# Patient Record
Sex: FEMALE | Race: WHITE | Employment: OTHER | URBAN - METROPOLITAN AREA
[De-identification: names, ages, dates, MRNs, and addresses within clinical notes are randomized per-mention and may not be internally consistent; named-entity substitution may affect disease eponyms.]

---

## 2018-06-29 ENCOUNTER — HOSPITAL ENCOUNTER (OUTPATIENT)
Dept: INFUSION THERAPY | Age: 83
Setting detail: INFUSION SERIES
Discharge: HOME | End: 2018-06-29
Payer: MEDICARE

## 2018-06-29 VITALS
SYSTOLIC BLOOD PRESSURE: 148 MMHG | DIASTOLIC BLOOD PRESSURE: 64 MMHG | OXYGEN SATURATION: 95 % | RESPIRATION RATE: 16 BRPM | HEART RATE: 71 BPM | TEMPERATURE: 98.1 F

## 2018-06-29 DIAGNOSIS — M81.0 SENILE OSTEOPOROSIS: ICD-10-CM

## 2018-06-29 LAB — CALCIUM SERPL-MCNC: 9.8 MG/DL (ref 8.6–10.2)

## 2018-06-29 PROCEDURE — 36415 COLL VENOUS BLD VENIPUNCTURE: CPT

## 2018-06-29 PROCEDURE — 96372 THER/PROPH/DIAG INJ SC/IM: CPT

## 2018-06-29 PROCEDURE — 6360000002 HC RX W HCPCS: Performed by: INTERNAL MEDICINE

## 2018-06-29 PROCEDURE — 82310 ASSAY OF CALCIUM: CPT

## 2018-06-29 RX ADMIN — DENOSUMAB 60 MG: 60 INJECTION SUBCUTANEOUS at 09:28

## 2019-01-02 ENCOUNTER — HOSPITAL ENCOUNTER (OUTPATIENT)
Dept: INFUSION THERAPY | Age: 84
Setting detail: INFUSION SERIES
Discharge: HOME | End: 2019-01-02
Payer: MEDICARE

## 2019-01-02 VITALS
OXYGEN SATURATION: 95 % | RESPIRATION RATE: 18 BRPM | SYSTOLIC BLOOD PRESSURE: 140 MMHG | TEMPERATURE: 98 F | DIASTOLIC BLOOD PRESSURE: 65 MMHG | HEART RATE: 77 BPM

## 2019-01-02 DIAGNOSIS — M81.0 SENILE OSTEOPOROSIS: ICD-10-CM

## 2019-01-02 LAB — CALCIUM SERPL-MCNC: 9.6 MG/DL (ref 8.6–10.2)

## 2019-01-02 PROCEDURE — 6360000002 HC RX W HCPCS: Performed by: INTERNAL MEDICINE

## 2019-01-02 PROCEDURE — 82310 ASSAY OF CALCIUM: CPT

## 2019-01-02 PROCEDURE — 96372 THER/PROPH/DIAG INJ SC/IM: CPT

## 2019-01-02 PROCEDURE — 36415 COLL VENOUS BLD VENIPUNCTURE: CPT

## 2019-01-02 RX ADMIN — DENOSUMAB 60 MG: 60 INJECTION SUBCUTANEOUS at 10:44

## 2019-01-02 NOTE — PROGRESS NOTES
Patient was offered a patient informational packet at discharge. Patient refuses packet at this time.

## 2019-02-15 ENCOUNTER — HOSPITAL ENCOUNTER (OUTPATIENT)
Dept: MRI IMAGING | Age: 84
Discharge: HOME | End: 2019-02-17
Payer: MEDICARE

## 2019-02-15 DIAGNOSIS — R42 DIZZINESS AND GIDDINESS: ICD-10-CM

## 2019-02-15 PROCEDURE — 70551 MRI BRAIN STEM W/O DYE: CPT

## 2019-08-08 ENCOUNTER — HOSPITAL ENCOUNTER (OUTPATIENT)
Dept: INFUSION THERAPY | Age: 84
Setting detail: INFUSION SERIES
Discharge: HOME | End: 2019-08-08
Payer: MEDICARE

## 2019-08-08 DIAGNOSIS — M81.0 SENILE OSTEOPOROSIS: Primary | ICD-10-CM

## 2019-08-08 LAB — CALCIUM SERPL-MCNC: 10.3 MG/DL (ref 8.6–10.2)

## 2019-08-08 PROCEDURE — 82310 ASSAY OF CALCIUM: CPT

## 2019-08-08 PROCEDURE — 36415 COLL VENOUS BLD VENIPUNCTURE: CPT

## 2019-08-08 PROCEDURE — 6360000002 HC RX W HCPCS: Performed by: INTERNAL MEDICINE

## 2019-08-08 PROCEDURE — 96372 THER/PROPH/DIAG INJ SC/IM: CPT

## 2019-08-08 RX ADMIN — DENOSUMAB 60 MG: 60 INJECTION SUBCUTANEOUS at 11:20

## 2019-09-19 ENCOUNTER — APPOINTMENT (OUTPATIENT)
Dept: ULTRASOUND IMAGING | Age: 84
End: 2019-09-19
Payer: MEDICARE

## 2019-09-19 ENCOUNTER — APPOINTMENT (OUTPATIENT)
Dept: CT IMAGING | Age: 84
End: 2019-09-19
Payer: MEDICARE

## 2019-09-19 ENCOUNTER — APPOINTMENT (OUTPATIENT)
Dept: GENERAL RADIOLOGY | Age: 84
End: 2019-09-19
Payer: MEDICARE

## 2019-09-19 ENCOUNTER — HOSPITAL ENCOUNTER (OUTPATIENT)
Age: 84
Setting detail: OBSERVATION
Discharge: HOME | End: 2019-09-21
Attending: EMERGENCY MEDICINE | Admitting: INTERNAL MEDICINE
Payer: MEDICARE

## 2019-09-19 DIAGNOSIS — R93.5 ABNORMAL CT OF THE ABDOMEN: ICD-10-CM

## 2019-09-19 DIAGNOSIS — N30.00 ACUTE CYSTITIS WITHOUT HEMATURIA: ICD-10-CM

## 2019-09-19 DIAGNOSIS — R11.0 NAUSEA: ICD-10-CM

## 2019-09-19 DIAGNOSIS — R10.11 ABDOMINAL PAIN, RIGHT UPPER QUADRANT: Primary | ICD-10-CM

## 2019-09-19 LAB
ALBUMIN SERPL-MCNC: 4.4 G/DL (ref 3.5–5.2)
ALP BLD-CCNC: 61 U/L (ref 35–104)
ALT SERPL-CCNC: 15 U/L (ref 0–32)
ANION GAP SERPL CALCULATED.3IONS-SCNC: 14 MMOL/L (ref 7–16)
AST SERPL-CCNC: 19 U/L (ref 0–31)
BACTERIA: ABNORMAL /HPF
BASOPHILS ABSOLUTE: 0.08 E9/L (ref 0–0.2)
BASOPHILS RELATIVE PERCENT: 1.2 % (ref 0–2)
BILIRUB SERPL-MCNC: 0.4 MG/DL (ref 0–1.2)
BILIRUBIN URINE: NEGATIVE
BLOOD, URINE: NEGATIVE
BUN BLDV-MCNC: 26 MG/DL (ref 8–23)
CALCIUM SERPL-MCNC: 9.1 MG/DL (ref 8.6–10.2)
CHLORIDE BLD-SCNC: 105 MMOL/L (ref 98–107)
CLARITY: ABNORMAL
CO2: 23 MMOL/L (ref 22–29)
COLOR: YELLOW
CREAT SERPL-MCNC: 1.3 MG/DL (ref 0.5–1)
EKG ATRIAL RATE: 78 BPM
EKG P AXIS: 69 DEGREES
EKG P-R INTERVAL: 194 MS
EKG Q-T INTERVAL: 380 MS
EKG QRS DURATION: 92 MS
EKG QTC CALCULATION (BAZETT): 433 MS
EKG R AXIS: 29 DEGREES
EKG T AXIS: 37 DEGREES
EKG VENTRICULAR RATE: 78 BPM
EOSINOPHILS ABSOLUTE: 0.28 E9/L (ref 0.05–0.5)
EOSINOPHILS RELATIVE PERCENT: 4 % (ref 0–6)
GFR AFRICAN AMERICAN: 47
GFR NON-AFRICAN AMERICAN: 39 ML/MIN/1.73
GLUCOSE BLD-MCNC: 116 MG/DL (ref 74–99)
GLUCOSE URINE: NEGATIVE MG/DL
HCT VFR BLD CALC: 42.4 % (ref 34–48)
HEMOGLOBIN: 14.1 G/DL (ref 11.5–15.5)
IMMATURE GRANULOCYTES #: 0.06 E9/L
IMMATURE GRANULOCYTES %: 0.9 % (ref 0–5)
KETONES, URINE: NEGATIVE MG/DL
LACTIC ACID: 1.5 MMOL/L (ref 0.5–2.2)
LEUKOCYTE ESTERASE, URINE: ABNORMAL
LIPASE: 27 U/L (ref 13–60)
LYMPHOCYTES ABSOLUTE: 1.06 E9/L (ref 1.5–4)
LYMPHOCYTES RELATIVE PERCENT: 15.3 % (ref 20–42)
MCH RBC QN AUTO: 28.3 PG (ref 26–35)
MCHC RBC AUTO-ENTMCNC: 33.3 % (ref 32–34.5)
MCV RBC AUTO: 85.1 FL (ref 80–99.9)
MONOCYTES ABSOLUTE: 0.81 E9/L (ref 0.1–0.95)
MONOCYTES RELATIVE PERCENT: 11.7 % (ref 2–12)
NEUTROPHILS ABSOLUTE: 4.63 E9/L (ref 1.8–7.3)
NEUTROPHILS RELATIVE PERCENT: 66.9 % (ref 43–80)
NITRITE, URINE: NEGATIVE
PDW BLD-RTO: 14.6 FL (ref 11.5–15)
PH UA: 6.5 (ref 5–9)
PLATELET # BLD: 201 E9/L (ref 130–450)
PMV BLD AUTO: 11.2 FL (ref 7–12)
POTASSIUM SERPL-SCNC: 4.5 MMOL/L (ref 3.5–5)
PROTEIN UA: NEGATIVE MG/DL
RBC # BLD: 4.98 E12/L (ref 3.5–5.5)
RBC UA: ABNORMAL /HPF (ref 0–2)
SODIUM BLD-SCNC: 142 MMOL/L (ref 132–146)
SPECIFIC GRAVITY UA: 1.01 (ref 1–1.03)
TOTAL PROTEIN: 7.9 G/DL (ref 6.4–8.3)
TROPONIN: <0.01 NG/ML (ref 0–0.03)
UROBILINOGEN, URINE: 0.2 E.U./DL
WBC # BLD: 6.9 E9/L (ref 4.5–11.5)
WBC UA: ABNORMAL /HPF (ref 0–5)

## 2019-09-19 PROCEDURE — 85025 COMPLETE CBC W/AUTO DIFF WBC: CPT

## 2019-09-19 PROCEDURE — 6360000002 HC RX W HCPCS: Performed by: INTERNAL MEDICINE

## 2019-09-19 PROCEDURE — 99285 EMERGENCY DEPT VISIT HI MDM: CPT

## 2019-09-19 PROCEDURE — 36415 COLL VENOUS BLD VENIPUNCTURE: CPT

## 2019-09-19 PROCEDURE — C9113 INJ PANTOPRAZOLE SODIUM, VIA: HCPCS | Performed by: INTERNAL MEDICINE

## 2019-09-19 PROCEDURE — 6360000004 HC RX CONTRAST MEDICATION: Performed by: RADIOLOGY

## 2019-09-19 PROCEDURE — 84484 ASSAY OF TROPONIN QUANT: CPT

## 2019-09-19 PROCEDURE — 96375 TX/PRO/DX INJ NEW DRUG ADDON: CPT

## 2019-09-19 PROCEDURE — 93005 ELECTROCARDIOGRAM TRACING: CPT | Performed by: PHYSICIAN ASSISTANT

## 2019-09-19 PROCEDURE — 6360000002 HC RX W HCPCS: Performed by: PHYSICIAN ASSISTANT

## 2019-09-19 PROCEDURE — G0378 HOSPITAL OBSERVATION PER HR: HCPCS

## 2019-09-19 PROCEDURE — 87077 CULTURE AEROBIC IDENTIFY: CPT

## 2019-09-19 PROCEDURE — C9113 INJ PANTOPRAZOLE SODIUM, VIA: HCPCS | Performed by: PHYSICIAN ASSISTANT

## 2019-09-19 PROCEDURE — 2580000003 HC RX 258: Performed by: PHYSICIAN ASSISTANT

## 2019-09-19 PROCEDURE — 71046 X-RAY EXAM CHEST 2 VIEWS: CPT

## 2019-09-19 PROCEDURE — 81001 URINALYSIS AUTO W/SCOPE: CPT

## 2019-09-19 PROCEDURE — 6370000000 HC RX 637 (ALT 250 FOR IP): Performed by: INTERNAL MEDICINE

## 2019-09-19 PROCEDURE — 2580000003 HC RX 258: Performed by: INTERNAL MEDICINE

## 2019-09-19 PROCEDURE — 83605 ASSAY OF LACTIC ACID: CPT

## 2019-09-19 PROCEDURE — 96374 THER/PROPH/DIAG INJ IV PUSH: CPT

## 2019-09-19 PROCEDURE — 83690 ASSAY OF LIPASE: CPT

## 2019-09-19 PROCEDURE — 87186 SC STD MICRODIL/AGAR DIL: CPT

## 2019-09-19 PROCEDURE — 96376 TX/PRO/DX INJ SAME DRUG ADON: CPT

## 2019-09-19 PROCEDURE — 96372 THER/PROPH/DIAG INJ SC/IM: CPT

## 2019-09-19 PROCEDURE — 80053 COMPREHEN METABOLIC PANEL: CPT

## 2019-09-19 PROCEDURE — 74177 CT ABD & PELVIS W/CONTRAST: CPT

## 2019-09-19 PROCEDURE — 87088 URINE BACTERIA CULTURE: CPT

## 2019-09-19 PROCEDURE — 93010 ELECTROCARDIOGRAM REPORT: CPT | Performed by: INTERNAL MEDICINE

## 2019-09-19 PROCEDURE — 76705 ECHO EXAM OF ABDOMEN: CPT

## 2019-09-19 RX ORDER — LEVOTHYROXINE SODIUM 0.05 MG/1
50 TABLET ORAL DAILY
COMMUNITY
End: 2021-06-07 | Stop reason: SDUPTHER

## 2019-09-19 RX ORDER — 0.9 % SODIUM CHLORIDE 0.9 %
10 VIAL (ML) INJECTION DAILY
Status: DISCONTINUED | OUTPATIENT
Start: 2019-09-19 | End: 2019-09-20

## 2019-09-19 RX ORDER — LISINOPRIL 10 MG/1
10 TABLET ORAL DAILY
Status: DISCONTINUED | OUTPATIENT
Start: 2019-09-19 | End: 2019-09-21 | Stop reason: HOSPADM

## 2019-09-19 RX ORDER — AMLODIPINE BESYLATE AND BENAZEPRIL HYDROCHLORIDE 5; 10 MG/1; MG/1
1 CAPSULE ORAL 2 TIMES DAILY
Status: DISCONTINUED | OUTPATIENT
Start: 2019-09-19 | End: 2019-09-19 | Stop reason: CLARIF

## 2019-09-19 RX ORDER — AMLODIPINE BESYLATE AND BENAZEPRIL HYDROCHLORIDE 5; 10 MG/1; MG/1
1 CAPSULE ORAL 2 TIMES DAILY
COMMUNITY
End: 2021-06-07 | Stop reason: SDUPTHER

## 2019-09-19 RX ORDER — AMLODIPINE BESYLATE 5 MG/1
5 TABLET ORAL DAILY
Status: DISCONTINUED | OUTPATIENT
Start: 2019-09-19 | End: 2019-09-21 | Stop reason: HOSPADM

## 2019-09-19 RX ORDER — PROMETHAZINE HYDROCHLORIDE 25 MG/ML
25 INJECTION, SOLUTION INTRAMUSCULAR; INTRAVENOUS ONCE
Status: COMPLETED | OUTPATIENT
Start: 2019-09-19 | End: 2019-09-19

## 2019-09-19 RX ORDER — 0.9 % SODIUM CHLORIDE 0.9 %
1000 INTRAVENOUS SOLUTION INTRAVENOUS ONCE
Status: COMPLETED | OUTPATIENT
Start: 2019-09-19 | End: 2019-09-19

## 2019-09-19 RX ORDER — PANTOPRAZOLE SODIUM 40 MG/10ML
40 INJECTION, POWDER, LYOPHILIZED, FOR SOLUTION INTRAVENOUS DAILY
Status: DISCONTINUED | OUTPATIENT
Start: 2019-09-19 | End: 2019-09-19 | Stop reason: SDUPTHER

## 2019-09-19 RX ORDER — HYDROCODONE BITARTRATE AND ACETAMINOPHEN 5; 325 MG/1; MG/1
1 TABLET ORAL EVERY 6 HOURS PRN
Status: DISCONTINUED | OUTPATIENT
Start: 2019-09-19 | End: 2019-09-21 | Stop reason: HOSPADM

## 2019-09-19 RX ORDER — SIMVASTATIN 20 MG
20 TABLET ORAL DAILY
Status: DISCONTINUED | OUTPATIENT
Start: 2019-09-19 | End: 2019-09-21 | Stop reason: HOSPADM

## 2019-09-19 RX ORDER — FENTANYL CITRATE 50 UG/ML
25 INJECTION, SOLUTION INTRAMUSCULAR; INTRAVENOUS ONCE
Status: COMPLETED | OUTPATIENT
Start: 2019-09-19 | End: 2019-09-19

## 2019-09-19 RX ORDER — PANTOPRAZOLE SODIUM 40 MG/10ML
40 INJECTION, POWDER, LYOPHILIZED, FOR SOLUTION INTRAVENOUS ONCE
Status: COMPLETED | OUTPATIENT
Start: 2019-09-19 | End: 2019-09-19

## 2019-09-19 RX ORDER — ONDANSETRON 2 MG/ML
4 INJECTION INTRAMUSCULAR; INTRAVENOUS EVERY 8 HOURS PRN
Status: DISCONTINUED | OUTPATIENT
Start: 2019-09-19 | End: 2019-09-21 | Stop reason: HOSPADM

## 2019-09-19 RX ORDER — KETOROLAC TROMETHAMINE 15 MG/ML
15 INJECTION, SOLUTION INTRAMUSCULAR; INTRAVENOUS ONCE
Status: COMPLETED | OUTPATIENT
Start: 2019-09-19 | End: 2019-09-19

## 2019-09-19 RX ORDER — LEVOTHYROXINE SODIUM 0.05 MG/1
50 TABLET ORAL DAILY
Status: DISCONTINUED | OUTPATIENT
Start: 2019-09-19 | End: 2019-09-21 | Stop reason: HOSPADM

## 2019-09-19 RX ORDER — PANTOPRAZOLE SODIUM 40 MG/10ML
40 INJECTION, POWDER, LYOPHILIZED, FOR SOLUTION INTRAVENOUS DAILY
Status: DISCONTINUED | OUTPATIENT
Start: 2019-09-19 | End: 2019-09-20

## 2019-09-19 RX ORDER — PANTOPRAZOLE SODIUM 40 MG/1
40 TABLET, DELAYED RELEASE ORAL DAILY
COMMUNITY
End: 2021-06-07 | Stop reason: SDUPTHER

## 2019-09-19 RX ORDER — ONDANSETRON 2 MG/ML
4 INJECTION INTRAMUSCULAR; INTRAVENOUS ONCE
Status: COMPLETED | OUTPATIENT
Start: 2019-09-19 | End: 2019-09-19

## 2019-09-19 RX ADMIN — FENTANYL CITRATE 25 MCG: 50 INJECTION INTRAMUSCULAR; INTRAVENOUS at 11:00

## 2019-09-19 RX ADMIN — PROMETHAZINE HYDROCHLORIDE 25 MG: 25 INJECTION INTRAMUSCULAR; INTRAVENOUS at 12:20

## 2019-09-19 RX ADMIN — WATER 1 G: 1 INJECTION INTRAMUSCULAR; INTRAVENOUS; SUBCUTANEOUS at 11:01

## 2019-09-19 RX ADMIN — AMLODIPINE BESYLATE 5 MG: 5 TABLET ORAL at 17:39

## 2019-09-19 RX ADMIN — FENTANYL CITRATE 25 MCG: 50 INJECTION INTRAMUSCULAR; INTRAVENOUS at 12:58

## 2019-09-19 RX ADMIN — HYDROCODONE BITARTRATE AND ACETAMINOPHEN 1 TABLET: 5; 325 TABLET ORAL at 20:11

## 2019-09-19 RX ADMIN — LISINOPRIL 10 MG: 10 TABLET ORAL at 17:39

## 2019-09-19 RX ADMIN — PANTOPRAZOLE SODIUM 40 MG: 40 INJECTION, POWDER, FOR SOLUTION INTRAVENOUS at 17:39

## 2019-09-19 RX ADMIN — SIMVASTATIN 20 MG: 20 TABLET, FILM COATED ORAL at 17:41

## 2019-09-19 RX ADMIN — LEVOTHYROXINE SODIUM 50 MCG: 50 TABLET ORAL at 17:39

## 2019-09-19 RX ADMIN — ONDANSETRON 4 MG: 2 INJECTION INTRAMUSCULAR; INTRAVENOUS at 08:46

## 2019-09-19 RX ADMIN — SODIUM CHLORIDE 10 ML: 9 INJECTION, SOLUTION INTRAMUSCULAR; INTRAVENOUS; SUBCUTANEOUS at 17:39

## 2019-09-19 RX ADMIN — IOPAMIDOL 80 ML: 755 INJECTION, SOLUTION INTRAVENOUS at 09:35

## 2019-09-19 RX ADMIN — SODIUM CHLORIDE 1000 ML: 9 INJECTION, SOLUTION INTRAVENOUS at 08:45

## 2019-09-19 RX ADMIN — PANTOPRAZOLE SODIUM 40 MG: 40 INJECTION, POWDER, FOR SOLUTION INTRAVENOUS at 12:58

## 2019-09-19 RX ADMIN — KETOROLAC TROMETHAMINE 15 MG: 15 INJECTION, SOLUTION INTRAMUSCULAR; INTRAVENOUS at 08:45

## 2019-09-19 ASSESSMENT — PAIN DESCRIPTION - ONSET: ONSET: ON-GOING

## 2019-09-19 ASSESSMENT — PAIN DESCRIPTION - FREQUENCY
FREQUENCY: CONTINUOUS

## 2019-09-19 ASSESSMENT — PAIN DESCRIPTION - DESCRIPTORS
DESCRIPTORS: NAGGING
DESCRIPTORS: SHARP
DESCRIPTORS: NAGGING

## 2019-09-19 ASSESSMENT — PAIN - FUNCTIONAL ASSESSMENT: PAIN_FUNCTIONAL_ASSESSMENT: PREVENTS OR INTERFERES SOME ACTIVE ACTIVITIES AND ADLS

## 2019-09-19 ASSESSMENT — PAIN DESCRIPTION - PAIN TYPE
TYPE: ACUTE PAIN
TYPE: ACUTE PAIN

## 2019-09-19 ASSESSMENT — PAIN DESCRIPTION - LOCATION
LOCATION: ABDOMEN;FLANK
LOCATION: ABDOMEN
LOCATION: ABDOMEN

## 2019-09-19 ASSESSMENT — PAIN SCALES - GENERAL
PAINLEVEL_OUTOF10: 9
PAINLEVEL_OUTOF10: 8
PAINLEVEL_OUTOF10: 10
PAINLEVEL_OUTOF10: 8
PAINLEVEL_OUTOF10: 0
PAINLEVEL_OUTOF10: 9
PAINLEVEL_OUTOF10: 9

## 2019-09-19 ASSESSMENT — PAIN DESCRIPTION - ORIENTATION
ORIENTATION: RIGHT
ORIENTATION: RIGHT;OUTER
ORIENTATION: RIGHT;OUTER

## 2019-09-19 ASSESSMENT — PAIN DESCRIPTION - PROGRESSION: CLINICAL_PROGRESSION: NOT CHANGED

## 2019-09-19 NOTE — PLAN OF CARE
Problem: Pain:  Goal: Pain level will decrease  Description  Pain level will decrease  Outcome: Met This Shift  Goal: Control of acute pain  Description  Control of acute pain  Outcome: Met This Shift     Problem: Falls - Risk of:  Goal: Will remain free from falls  Description  Will remain free from falls  Outcome: Met This Shift     Problem: Risk for Impaired Skin Integrity  Goal: Tissue integrity - skin and mucous membranes  Description  Structural intactness and normal physiological function of skin and  mucous membranes.   Outcome: Met This Shift

## 2019-09-19 NOTE — CONSULTS
[facility]  Dr. Micah Wheeler M.D., Dr. Laurent Sever, M.D., TIARRA CaleroO., Dr. Corrina Morrow M.D., Dr. Melissa Fritz D.O. Patient Name: Yasmine Garcia  MRN: [id number]  : 1932 (80 y.o. female)  Allergies: is allergic to morphine sulfate; vitamin k [phytonadione]; and penicillins. Date of Service: 2019       Reason for Consultation:  Abnormal Ct of the abdomen     HISTORY OF PRESENT ILLNESS:    Udayalon Sequeira is a 44-year-old female with a past history significant for hypothyroidism,HLD,Essential HTN,and GERD. Patient presents to suggest emergency room this afternoon with a main complaint of right upper quadrant pain. patient states she has had right upper quadrant pain with radiation into her right flank that has been ongoing for the past 24 hours. Patient states that started acutely. Pt denies any correlation with food. Pt has a hx of lap millie. Patient has some radiation into her back. Patient denies any rash. Patient denies any hematemesis hematochezia or melena. Routine lab works obtained. Patient was found to have a creatinine of 1.3 BUN 26 hemoglobin stable at 14.1 hematocrit 42.4 MCV of 85. CT the abdomen was obtained. CT abdomen showed gastric wall thickening infectious versus inflammatory versus malignancy. . Patient denies any history of EGD. Patient denies any recent travel or time spent in the woods or ingestion of foods. Patient states last colonoscopy was approximately year and half ago with Dr. Nandini Allen in the office. REVIEW OF SYSTEMS:   Constitutional: Denies fever, chills, or unintentional weight loss. HEENT: Denies double or blurry vision, headaches, ear pain or ringing in the ears. No drainage from the ears, nose or throat. Cardiovascular: Denies any chest pain, irregular heartbeats, or palpitations. Respiratory: Denies shortness of breath, coughing, sputum production, hemoptysis, or wheezing.    Gastrointestinal: Denies nausea,  OTHER SURGICAL HISTORY  07/23/2012    repair lumbar meningiocele with lumbar drain    OTHER SURGICAL HISTORY N/A 10/04/2016    sacral nerve stimulation    TONSILLECTOMY      WRIST FRACTURE SURGERY  2008    left       Home Medications:  Prior to Admission medications    Medication Sig Start Date End Date Taking? Authorizing Provider   amLODIPine-benazepril (LOTREL) 5-10 MG per capsule Take 1 capsule by mouth 2 times daily   Yes Historical Provider, MD   levothyroxine (SYNTHROID) 50 MCG tablet Take 50 mcg by mouth Daily   Yes Historical Provider, MD   pantoprazole (PROTONIX) 40 MG tablet Take 40 mg by mouth daily   Yes Historical Provider, MD   prochlorperazine (COMPAZINE) 5 MG tablet Take 5 mg by mouth every 6 hours as needed for Nausea   Yes Historical Provider, MD   simvastatin (ZOCOR) 20 MG tablet Take 20 mg by mouth daily   Yes Historical Provider, MD   Multiple Vitamin (MULTI-VITAMIN PO) Take 1 tablet by mouth daily. Yes Historical Provider, MD   aspirin 81 MG chewable tablet   Take 81 mg by mouth daily    Yes Historical Provider, MD       Allergies: Morphine sulfate; Vitamin k [phytonadione]; and Penicillins    Social History:  Social History     Socioeconomic History    Marital status:      Spouse name: Not on file    Number of children: Not on file    Years of education: Not on file    Highest education level: Not on file   Occupational History    Not on file   Social Needs    Financial resource strain: Not on file    Food insecurity:     Worry: Not on file     Inability: Not on file    Transportation needs:     Medical: Not on file     Non-medical: Not on file   Tobacco Use    Smoking status: Never Smoker   Substance and Sexual Activity    Alcohol use:  Yes     Alcohol/week: 1.0 standard drinks     Types: 1 Glasses of wine per week     Comment: daily    Drug use: No    Sexual activity: Not on file   Lifestyle    Physical activity:     Days per week: Not on file     Minutes per Cranial nerves II-XII are grossly intact. Motor is 5 out of 5 bilaterally. SKIN: Normal skin color, texture, and turgor. There is no redness, warmth, or swelling. No bruising or bleeding, no mottling. PSYCH: Affect, behavior and insight are all within normal limits.       DATA:  Results for orders placed or performed during the hospital encounter of 09/19/19   CBC Auto Differential   Result Value Ref Range    WBC 6.9 4.5 - 11.5 E9/L    RBC 4.98 3.50 - 5.50 E12/L    Hemoglobin 14.1 11.5 - 15.5 g/dL    Hematocrit 42.4 34.0 - 48.0 %    MCV 85.1 80.0 - 99.9 fL    MCH 28.3 26.0 - 35.0 pg    MCHC 33.3 32.0 - 34.5 %    RDW 14.6 11.5 - 15.0 fL    Platelets 596 933 - 340 E9/L    MPV 11.2 7.0 - 12.0 fL    Neutrophils % 66.9 43.0 - 80.0 %    Immature Granulocytes % 0.9 0.0 - 5.0 %    Lymphocytes % 15.3 (L) 20.0 - 42.0 %    Monocytes % 11.7 2.0 - 12.0 %    Eosinophils % 4.0 0.0 - 6.0 %    Basophils % 1.2 0.0 - 2.0 %    Neutrophils Absolute 4.63 1.80 - 7.30 E9/L    Immature Granulocytes # 0.06 E9/L    Lymphocytes Absolute 1.06 (L) 1.50 - 4.00 E9/L    Monocytes Absolute 0.81 0.10 - 0.95 E9/L    Eosinophils Absolute 0.28 0.05 - 0.50 E9/L    Basophils Absolute 0.08 0.00 - 0.20 E9/L   Comprehensive Metabolic Panel   Result Value Ref Range    Sodium 142 132 - 146 mmol/L    Potassium 4.5 3.5 - 5.0 mmol/L    Chloride 105 98 - 107 mmol/L    CO2 23 22 - 29 mmol/L    Anion Gap 14 7 - 16 mmol/L    Glucose 116 (H) 74 - 99 mg/dL    BUN 26 (H) 8 - 23 mg/dL    CREATININE 1.3 (H) 0.5 - 1.0 mg/dL    GFR Non-African American 39 >=60 mL/min/1.73    GFR African American 47     Calcium 9.1 8.6 - 10.2 mg/dL    Total Protein 7.9 6.4 - 8.3 g/dL    Alb 4.4 3.5 - 5.2 g/dL    Total Bilirubin 0.4 0.0 - 1.2 mg/dL    Alkaline Phosphatase 61 35 - 104 U/L    ALT 15 0 - 32 U/L    AST 19 0 - 31 U/L   Lactic Acid, Plasma   Result Value Ref Range    Lactic Acid 1.5 0.5 - 2.2 mmol/L   Lipase   Result Value Ref Range    Lipase 27 13 - 60 U/L   Urinalysis

## 2019-09-20 ENCOUNTER — APPOINTMENT (OUTPATIENT)
Dept: GENERAL RADIOLOGY | Age: 84
End: 2019-09-20
Payer: MEDICARE

## 2019-09-20 ENCOUNTER — ANESTHESIA (OUTPATIENT)
Dept: ENDOSCOPY | Age: 84
End: 2019-09-20
Payer: MEDICARE

## 2019-09-20 ENCOUNTER — ANESTHESIA EVENT (OUTPATIENT)
Dept: ENDOSCOPY | Age: 84
End: 2019-09-20
Payer: MEDICARE

## 2019-09-20 VITALS
DIASTOLIC BLOOD PRESSURE: 79 MMHG | SYSTOLIC BLOOD PRESSURE: 166 MMHG | OXYGEN SATURATION: 95 % | RESPIRATION RATE: 10 BRPM

## 2019-09-20 LAB
ALBUMIN SERPL-MCNC: 4 G/DL (ref 3.5–5.2)
ALP BLD-CCNC: 49 U/L (ref 35–104)
ALT SERPL-CCNC: 12 U/L (ref 0–32)
ANION GAP SERPL CALCULATED.3IONS-SCNC: 12 MMOL/L (ref 7–16)
AST SERPL-CCNC: 15 U/L (ref 0–31)
BILIRUB SERPL-MCNC: 0.5 MG/DL (ref 0–1.2)
BUN BLDV-MCNC: 21 MG/DL (ref 8–23)
CALCIUM SERPL-MCNC: 8.5 MG/DL (ref 8.6–10.2)
CHLORIDE BLD-SCNC: 105 MMOL/L (ref 98–107)
CO2: 23 MMOL/L (ref 22–29)
CREAT SERPL-MCNC: 1.3 MG/DL (ref 0.5–1)
GFR AFRICAN AMERICAN: 47
GFR NON-AFRICAN AMERICAN: 39 ML/MIN/1.73
GLUCOSE BLD-MCNC: 113 MG/DL (ref 74–99)
HCT VFR BLD CALC: 39.4 % (ref 34–48)
HEMOGLOBIN: 13.1 G/DL (ref 11.5–15.5)
MCH RBC QN AUTO: 28.4 PG (ref 26–35)
MCHC RBC AUTO-ENTMCNC: 33.2 % (ref 32–34.5)
MCV RBC AUTO: 85.5 FL (ref 80–99.9)
PDW BLD-RTO: 14.9 FL (ref 11.5–15)
PLATELET # BLD: 189 E9/L (ref 130–450)
PMV BLD AUTO: 11 FL (ref 7–12)
POTASSIUM SERPL-SCNC: 4 MMOL/L (ref 3.5–5)
RBC # BLD: 4.61 E12/L (ref 3.5–5.5)
SODIUM BLD-SCNC: 140 MMOL/L (ref 132–146)
TOTAL PROTEIN: 6.5 G/DL (ref 6.4–8.3)
WBC # BLD: 6.9 E9/L (ref 4.5–11.5)

## 2019-09-20 PROCEDURE — 6370000000 HC RX 637 (ALT 250 FOR IP): Performed by: INTERNAL MEDICINE

## 2019-09-20 PROCEDURE — 7100000010 HC PHASE II RECOVERY - FIRST 15 MIN: Performed by: INTERNAL MEDICINE

## 2019-09-20 PROCEDURE — 3700000000 HC ANESTHESIA ATTENDED CARE: Performed by: INTERNAL MEDICINE

## 2019-09-20 PROCEDURE — G0378 HOSPITAL OBSERVATION PER HR: HCPCS

## 2019-09-20 PROCEDURE — 2580000003 HC RX 258

## 2019-09-20 PROCEDURE — 3700000001 HC ADD 15 MINUTES (ANESTHESIA): Performed by: INTERNAL MEDICINE

## 2019-09-20 PROCEDURE — 80053 COMPREHEN METABOLIC PANEL: CPT

## 2019-09-20 PROCEDURE — 36415 COLL VENOUS BLD VENIPUNCTURE: CPT

## 2019-09-20 PROCEDURE — 7100000011 HC PHASE II RECOVERY - ADDTL 15 MIN: Performed by: INTERNAL MEDICINE

## 2019-09-20 PROCEDURE — 72070 X-RAY EXAM THORAC SPINE 2VWS: CPT

## 2019-09-20 PROCEDURE — 2709999900 HC NON-CHARGEABLE SUPPLY: Performed by: INTERNAL MEDICINE

## 2019-09-20 PROCEDURE — C9113 INJ PANTOPRAZOLE SODIUM, VIA: HCPCS | Performed by: INTERNAL MEDICINE

## 2019-09-20 PROCEDURE — 3609017100 HC EGD: Performed by: INTERNAL MEDICINE

## 2019-09-20 PROCEDURE — 6360000002 HC RX W HCPCS: Performed by: NURSE ANESTHETIST, CERTIFIED REGISTERED

## 2019-09-20 PROCEDURE — 2580000003 HC RX 258: Performed by: NURSE ANESTHETIST, CERTIFIED REGISTERED

## 2019-09-20 PROCEDURE — 6360000002 HC RX W HCPCS: Performed by: INTERNAL MEDICINE

## 2019-09-20 PROCEDURE — 72100 X-RAY EXAM L-S SPINE 2/3 VWS: CPT

## 2019-09-20 PROCEDURE — 85027 COMPLETE CBC AUTOMATED: CPT

## 2019-09-20 RX ORDER — PROPOFOL 10 MG/ML
INJECTION, EMULSION INTRAVENOUS CONTINUOUS PRN
Status: DISCONTINUED | OUTPATIENT
Start: 2019-09-20 | End: 2019-09-20 | Stop reason: SDUPTHER

## 2019-09-20 RX ORDER — SODIUM CHLORIDE 0.9 % (FLUSH) 0.9 %
SYRINGE (ML) INJECTION
Status: COMPLETED
Start: 2019-09-20 | End: 2019-09-20

## 2019-09-20 RX ORDER — PANTOPRAZOLE SODIUM 40 MG/1
40 TABLET, DELAYED RELEASE ORAL
Status: DISCONTINUED | OUTPATIENT
Start: 2019-09-21 | End: 2019-09-21 | Stop reason: HOSPADM

## 2019-09-20 RX ORDER — SODIUM CHLORIDE 9 MG/ML
INJECTION, SOLUTION INTRAVENOUS CONTINUOUS PRN
Status: DISCONTINUED | OUTPATIENT
Start: 2019-09-20 | End: 2019-09-20 | Stop reason: SDUPTHER

## 2019-09-20 RX ORDER — SUCRALFATE 1 G/1
1 TABLET ORAL
Status: DISCONTINUED | OUTPATIENT
Start: 2019-09-20 | End: 2019-09-21 | Stop reason: HOSPADM

## 2019-09-20 RX ADMIN — HYDROCODONE BITARTRATE AND ACETAMINOPHEN 1 TABLET: 5; 325 TABLET ORAL at 19:57

## 2019-09-20 RX ADMIN — PANTOPRAZOLE SODIUM 40 MG: 40 INJECTION, POWDER, FOR SOLUTION INTRAVENOUS at 09:48

## 2019-09-20 RX ADMIN — SODIUM CHLORIDE 10 ML: 9 INJECTION, SOLUTION INTRAMUSCULAR; INTRAVENOUS; SUBCUTANEOUS at 09:49

## 2019-09-20 RX ADMIN — Medication 10 ML: at 09:49

## 2019-09-20 RX ADMIN — HYDROCODONE BITARTRATE AND ACETAMINOPHEN 1 TABLET: 5; 325 TABLET ORAL at 06:54

## 2019-09-20 RX ADMIN — AMLODIPINE BESYLATE 5 MG: 5 TABLET ORAL at 09:47

## 2019-09-20 RX ADMIN — SIMVASTATIN 20 MG: 20 TABLET, FILM COATED ORAL at 09:47

## 2019-09-20 RX ADMIN — LEVOTHYROXINE SODIUM 50 MCG: 50 TABLET ORAL at 06:55

## 2019-09-20 RX ADMIN — SUCRALFATE 1 G: 1 TABLET ORAL at 21:21

## 2019-09-20 RX ADMIN — PROPOFOL 100 MCG/KG/MIN: 10 INJECTION, EMULSION INTRAVENOUS at 17:35

## 2019-09-20 RX ADMIN — SODIUM CHLORIDE: 9 INJECTION, SOLUTION INTRAVENOUS at 17:30

## 2019-09-20 RX ADMIN — LISINOPRIL 10 MG: 10 TABLET ORAL at 09:47

## 2019-09-20 ASSESSMENT — PAIN DESCRIPTION - DESCRIPTORS
DESCRIPTORS: DISCOMFORT;SHARP;STABBING
DESCRIPTORS: JABBING

## 2019-09-20 ASSESSMENT — PAIN SCALES - GENERAL
PAINLEVEL_OUTOF10: 7
PAINLEVEL_OUTOF10: 6
PAINLEVEL_OUTOF10: 8
PAINLEVEL_OUTOF10: 0
PAINLEVEL_OUTOF10: 3
PAINLEVEL_OUTOF10: 0

## 2019-09-20 ASSESSMENT — PAIN DESCRIPTION - LOCATION
LOCATION: ABDOMEN;FLANK
LOCATION: ABDOMEN

## 2019-09-20 ASSESSMENT — PAIN DESCRIPTION - ONSET: ONSET: GRADUAL

## 2019-09-20 ASSESSMENT — PAIN DESCRIPTION - PROGRESSION: CLINICAL_PROGRESSION: GRADUALLY WORSENING

## 2019-09-20 ASSESSMENT — PAIN DESCRIPTION - PAIN TYPE
TYPE: ACUTE PAIN
TYPE: ACUTE PAIN

## 2019-09-20 ASSESSMENT — PAIN DESCRIPTION - ORIENTATION
ORIENTATION: RIGHT
ORIENTATION: RIGHT;UPPER

## 2019-09-20 ASSESSMENT — PAIN - FUNCTIONAL ASSESSMENT: PAIN_FUNCTIONAL_ASSESSMENT: PREVENTS OR INTERFERES SOME ACTIVE ACTIVITIES AND ADLS

## 2019-09-20 ASSESSMENT — PAIN DESCRIPTION - FREQUENCY
FREQUENCY: CONTINUOUS
FREQUENCY: CONTINUOUS

## 2019-09-20 NOTE — PROCEDURES
Procedure:  Esophagogastroduodenoscopy    Indication: Abnormal Ct finding of gastric wall thickening     Sedation:  MAC      Endoscope was advanced easily through mouth to second portion of duodenum      Oropharynx views are limited but grossly normal.    Esophagus:   Mucosa is normal.  GEJ at 45 cm. Stomach:     4cm hiatal hernia present    Antrum shows a linear ulceration and gastritis. No thickening of the antral wall identified    Gastric body is normal.  Retroflexed views show normal fundus and cardia. Duodenum: Bulb is normal.    Second portion of duodenum is normal.    IMPRESSION AND PLAN:     1. Linear ulcer and gastritis of the antrum- Protonix 40mg daily. Advance diet. OK to be d/c from GI POV. Follow in office in 2-3 weeks or as needed - pt to call for appointment - [phone]. Pt was seen and procedure was performed with   present for the entire procedure    I was present for entire duration of procedure and participated in key and non-key portions. Discussed findings with the fellow and agree with recommendations above.     Sangeetha Grider DO  9/20/2019  5:52 PM

## 2019-09-20 NOTE — H&P
BOTOX   Norah Ink  [id number]    BOTOX    ECHO COMPL W DOP COLOR FLOW  7/20/2012         FRACTURE SURGERY      HEMORRHOID SURGERY  40 yrs ago    HYSTERECTOMY      KNEE ARTHROSCOPY  1/19/12    left     LUMBAR DRAIN IMPLANTATION  6/27/2012    OTHER SURGICAL HISTORY  07/23/2012    repair lumbar meningiocele with lumbar drain    OTHER SURGICAL HISTORY N/A 10/04/2016    sacral nerve stimulation    TONSILLECTOMY      WRIST FRACTURE SURGERY  2008    left             Allergies: Allergies   Allergen Reactions    Morphine Sulfate Anaphylaxis    Vitamin K [Phytonadione] Hives    Penicillins Hives and Rash         Blood pressure (!) 165/79, pulse 66, temperature 98.1 °F (36.7 °C), temperature source Oral, resp. rate 16, height 5' 4\" (1.626 m), weight 216 lb (98 kg), SpO2 92 %. Review of systems   no cardiopulmonary symptoms no dermatological symptoms no neuro symptoms    Physical Exam   HEENT unremarkable  Neck supple no JVDs no carotid bruit  Lungs clear to auscultation percussion  Heart regular rate and rhythm PMI present at the fifth intercostal space midclavicular line   systolic ejection murmur 2/6 on the left sternal border radiating to the apex. Abdomen soft bowel sounds present right upper quadrant and lower quadrant tenderness no rebound. Pain increased with postural change.   No bruit no ascites no skin changes  Extremities symmetrical no pedal edema NO calf tenderness bilaterally  Rectal exam deferred pelvic exam deferred  Neuro exam no change  Examination of the back showed low back midline scar    CBC with Differential:    Lab Results   Component Value Date    WBC 6.9 09/20/2019    RBC 4.61 09/20/2019    HGB 13.1 09/20/2019    HCT 39.4 09/20/2019     09/20/2019    MCV 85.5 09/20/2019    MCH 28.4 09/20/2019    MCHC 33.2 09/20/2019    RDW 14.9 09/20/2019    SEGSPCT 58 02/14/2014    BANDSPCT 3 06/13/2014    METASPCT 2 07/30/2012    LYMPHOPCT 15.3 09/19/2019    MONOPCT 11.7 09/19/2019

## 2019-09-20 NOTE — ANESTHESIA PRE PROCEDURE
Susana Sosa MD        amLODIPine (NORVASC) tablet 5 mg  5 mg Oral Daily Susana Sosa MD   5 mg at 09/20/19 0947    And    lisinopril (PRINIVIL;ZESTRIL) tablet 10 mg  10 mg Oral Daily Susana Sosa MD   10 mg at 09/20/19 0947    HYDROcodone-acetaminophen (NORCO) 5-325 MG per tablet 1 tablet  1 tablet Oral Q6H PRN Poppy Preston MD   1 tablet at 09/20/19 0654       Allergies: Allergies   Allergen Reactions    Morphine Sulfate Anaphylaxis    Vitamin K [Phytonadione] Hives    Penicillins Hives and Rash       Problem List:    Patient Active Problem List   Diagnosis Code    Phalanx, proximal fracture of R small finger  S62.619A    Degenerative arthritis of left knee M17.12    Medial meniscus tear S83.249A    Synovitis of knee M65.9    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 4XXA    Acute respiratory failure (HCC) J96.00    Allergic reaction caused by a drug-morphine T78.40XA    Lumbosacral spondylosis without myelopathy M47.817    Postlaminectomy syndrome, lumbar region M96.1    Chronic back pain M54.9, G89.29    Depression F32.9    Hypothyroid E03.9    Respiratory distress R06.03    Senile osteoporosis M81.0    RUQ abdominal pain R10.11       Past Medical History:        Diagnosis Date    Allergy     Arthritis     Chest pain 6-    lexiscan stress test    Chronic back pain     Depression     Headache(784.0)     History of blood transfusion     Hyperlipidemia     Hypertension     Hypothyroid     Incontinence of urine     pt has implant in bladder Risk Ident model [id number]    Memory changes     Pneumonia     Thyroid disease     Urinary tract infection     Varicella     Vision abnormalities        Past Surgical History:        Procedure Laterality Date    APPENDECTOMY      BACK SURGERY      BLADDER SURGERY      CHOLECYSTECTOMY      CLOSED REDUCTION OF A JOINT  10-    right little finger    CYSTOSCOPY  [id number]    BOTOX INJECTION     09/20/2019    K 4.0 09/20/2019     09/20/2019    CO2 23 09/20/2019    BUN 21 09/20/2019    CREATININE 1.3 09/20/2019    GFRAA 47 09/20/2019    LABGLOM 39 09/20/2019    GLUCOSE 113 09/20/2019    GLUCOSE 105 05/24/2012    PROT 6.5 09/20/2019    CALCIUM 8.5 09/20/2019    BILITOT 0.5 09/20/2019    ALKPHOS 49 09/20/2019    AST 15 09/20/2019    ALT 12 09/20/2019       POC Tests: No results for input(s): POCGLU, POCNA, POCK, POCCL, POCBUN, POCHEMO, POCHCT in the last 72 hours. Coags:   Lab Results   Component Value Date    PROTIME 12.3 06/13/2014    PROTIME 21.9 05/18/2012    INR 1.1 06/13/2014    APTT 27.2 06/13/2014       HCG (If Applicable): No results found for: PREGTESTUR, PREGSERUM, HCG, HCGQUANT     ABGs:   Lab Results   Component Value Date    B8TBMJVL 98.1 07/29/2012        Type & Screen (If Applicable):  No results found for: LABABO, [address]    Anesthesia Evaluation  Patient summary reviewed   history of anesthetic complications: difficult airway. Airway: Mallampati: II  TM distance: >3 FB   Neck ROM: full  Mouth opening: > = 3 FB Dental:          Pulmonary: breath sounds clear to auscultation  (+) pneumonia:      (-) COPD                           Cardiovascular:    (+) hypertension:, hyperlipidemia      ECG reviewed  Rhythm: regular  Rate: normal    Stress test reviewed                Neuro/Psych:   (+) neuromuscular disease:, headaches:, psychiatric history:            GI/Hepatic/Renal:        (-) no renal disease       Endo/Other:    (+) hypothyroidism::., .                 Abdominal:           Vascular:                                      Anesthesia Plan      MAC     ASA 2       Induction: intravenous. Anesthetic plan and risks discussed with patient and spouse. Plan discussed with CRNA.                   Chanda Flores MD   9/20/2019

## 2019-09-20 NOTE — PLAN OF CARE
Problem: Pain:  Goal: Pain level will decrease  Description  Pain level will decrease  9/20/2019 0534 by Trupti Soria RN  Outcome: Met This Shift     Problem: Pain:  Goal: Control of acute pain  Description  Control of acute pain  9/20/2019 0534 by Trupti Soria RN  Outcome: Met This Shift     Problem: Falls - Risk of:  Goal: Will remain free from falls  Description  Will remain free from falls  9/20/2019 0534 by Trutpi Soria RN  Outcome: Met This Shift     Problem: Risk for Impaired Skin Integrity  Goal: Tissue integrity - skin and mucous membranes  Description  Structural intactness and normal physiological function of skin and  mucous membranes.   9/20/2019 0534 by Trupti Soria RN  Outcome: Met This Shift

## 2019-09-21 VITALS
BODY MASS INDEX: 36.88 KG/M2 | DIASTOLIC BLOOD PRESSURE: 85 MMHG | RESPIRATION RATE: 18 BRPM | SYSTOLIC BLOOD PRESSURE: 140 MMHG | HEART RATE: 74 BPM | HEIGHT: 64 IN | OXYGEN SATURATION: 95 % | TEMPERATURE: 98.5 F | WEIGHT: 216 LBS

## 2019-09-21 PROBLEM — N39.0 UTI (URINARY TRACT INFECTION): Status: ACTIVE | Noted: 2019-09-21

## 2019-09-21 PROBLEM — K25.9 GASTRIC ULCER DUE TO NONSTEROIDAL ANTI-INFLAMMATORY DRUG (NSAID): Status: ACTIVE | Noted: 2019-09-21

## 2019-09-21 PROBLEM — T39.395A GASTRIC ULCER DUE TO NONSTEROIDAL ANTI-INFLAMMATORY DRUG (NSAID): Status: ACTIVE | Noted: 2019-09-21

## 2019-09-21 LAB
ORGANISM: ABNORMAL
URINE CULTURE, ROUTINE: ABNORMAL

## 2019-09-21 PROCEDURE — 6370000000 HC RX 637 (ALT 250 FOR IP): Performed by: INTERNAL MEDICINE

## 2019-09-21 PROCEDURE — G0378 HOSPITAL OBSERVATION PER HR: HCPCS

## 2019-09-21 RX ORDER — CIPROFLOXACIN 500 MG/1
250 TABLET, FILM COATED ORAL EVERY 12 HOURS SCHEDULED
Status: DISCONTINUED | OUTPATIENT
Start: 2019-09-21 | End: 2019-09-21 | Stop reason: HOSPADM

## 2019-09-21 RX ORDER — SUCRALFATE 1 G/1
1 TABLET ORAL
Qty: 120 TABLET | Refills: 3 | Status: SHIPPED | OUTPATIENT
Start: 2019-09-21 | End: 2019-09-21

## 2019-09-21 RX ORDER — CIPROFLOXACIN 500 MG/1
250 TABLET, FILM COATED ORAL EVERY 12 HOURS SCHEDULED
Status: DISCONTINUED | OUTPATIENT
Start: 2019-09-21 | End: 2019-09-21

## 2019-09-21 RX ORDER — CIPROFLOXACIN 250 MG/1
250 TABLET, FILM COATED ORAL EVERY 12 HOURS SCHEDULED
Qty: 20 TABLET | Refills: 0 | Status: SHIPPED | OUTPATIENT
Start: 2019-09-21 | End: 2019-09-21

## 2019-09-21 RX ORDER — CIPROFLOXACIN 250 MG/1
250 TABLET, FILM COATED ORAL EVERY 12 HOURS SCHEDULED
Qty: 20 TABLET | Refills: 0 | Status: SHIPPED | OUTPATIENT
Start: 2019-09-21 | End: 2019-10-01

## 2019-09-21 RX ORDER — SUCRALFATE 1 G/1
1 TABLET ORAL
Qty: 120 TABLET | Refills: 3 | Status: SHIPPED | OUTPATIENT
Start: 2019-09-21 | End: 2020-07-24 | Stop reason: ALTCHOICE

## 2019-09-21 RX ADMIN — LEVOTHYROXINE SODIUM 50 MCG: 50 TABLET ORAL at 06:47

## 2019-09-21 RX ADMIN — PANTOPRAZOLE SODIUM 40 MG: 40 TABLET, DELAYED RELEASE ORAL at 06:47

## 2019-09-21 RX ADMIN — SUCRALFATE 1 G: 1 TABLET ORAL at 06:47

## 2019-09-21 RX ADMIN — SIMVASTATIN 20 MG: 20 TABLET, FILM COATED ORAL at 08:16

## 2019-09-21 RX ADMIN — SUCRALFATE 1 G: 1 TABLET ORAL at 16:25

## 2019-09-21 RX ADMIN — CIPROFLOXACIN 250 MG: 500 TABLET, FILM COATED ORAL at 17:26

## 2019-09-21 RX ADMIN — HYDROCODONE BITARTRATE AND ACETAMINOPHEN 1 TABLET: 5; 325 TABLET ORAL at 06:47

## 2019-09-21 RX ADMIN — AMLODIPINE BESYLATE 5 MG: 5 TABLET ORAL at 08:16

## 2019-09-21 RX ADMIN — SUCRALFATE 1 G: 1 TABLET ORAL at 13:23

## 2019-09-21 RX ADMIN — LISINOPRIL 10 MG: 10 TABLET ORAL at 08:16

## 2019-09-21 RX ADMIN — HYDROCODONE BITARTRATE AND ACETAMINOPHEN 1 TABLET: 5; 325 TABLET ORAL at 13:23

## 2019-09-21 ASSESSMENT — PAIN SCALES - GENERAL
PAINLEVEL_OUTOF10: 2
PAINLEVEL_OUTOF10: 3
PAINLEVEL_OUTOF10: 9
PAINLEVEL_OUTOF10: 4
PAINLEVEL_OUTOF10: 3
PAINLEVEL_OUTOF10: 6

## 2019-09-21 ASSESSMENT — PAIN DESCRIPTION - LOCATION
LOCATION: FLANK
LOCATION: FLANK

## 2019-09-21 ASSESSMENT — PAIN DESCRIPTION - FREQUENCY
FREQUENCY: CONTINUOUS
FREQUENCY: CONTINUOUS

## 2019-09-21 ASSESSMENT — PAIN DESCRIPTION - PAIN TYPE
TYPE: ACUTE PAIN
TYPE: ACUTE PAIN

## 2019-09-21 ASSESSMENT — PAIN DESCRIPTION - PROGRESSION
CLINICAL_PROGRESSION: GRADUALLY IMPROVING
CLINICAL_PROGRESSION: NOT CHANGED

## 2019-09-21 ASSESSMENT — PAIN DESCRIPTION - DESCRIPTORS
DESCRIPTORS: ACHING;DISCOMFORT;SORE
DESCRIPTORS: ACHING;DISCOMFORT

## 2019-09-21 ASSESSMENT — PAIN DESCRIPTION - ORIENTATION
ORIENTATION: RIGHT
ORIENTATION: RIGHT

## 2019-09-21 ASSESSMENT — PAIN DESCRIPTION - ONSET
ONSET: GRADUAL
ONSET: ON-GOING

## 2019-09-21 ASSESSMENT — PAIN - FUNCTIONAL ASSESSMENT
PAIN_FUNCTIONAL_ASSESSMENT: ACTIVITIES ARE NOT PREVENTED
PAIN_FUNCTIONAL_ASSESSMENT: PREVENTS OR INTERFERES SOME ACTIVE ACTIVITIES AND ADLS

## 2019-09-21 NOTE — DISCHARGE SUMMARY
tablet at 09/21/19 0156    Consultants: Gastroenterology    Procedures: EGD    Hospital Course: The patient is a 80 y.o., female, complaining of pain in the right upper quadrant along her waistline and into her right flank.  The patient states this began 1 day ago and has been constant and moderately severe.  She states pain gets worse with movement especially when she tries to stand up from sitting or lying position.  The patient is status post cholecystectomy and appendectomy. Terry Saul has had some nausea and brought up what she describes as foam emesis.  No undigested food. Patient stated that she has been taking 2 Advil PM every night and when she developed right upper quadrant pain she started Aleve 3 tablets 3 times a day on top of daily baby aspirin. te patient denies any cough or fever.  She states that she has not recently been ill.  Bowels have been moving normally. Terry Saul denies any constipation, diarrhea, or black stools.  No visible blood seen.  The patient has had colonoscopies in the past.  She denies any urinary burning frequency or hematuria.  The patient does have history of a bladder implant.  No recent surgeries or hospitalizations.   Denies fall/trauma.   CT of the abdomen showed no acute findings  Ultrasound of the abdomen showed  1. The common bile duct is normal in caliber for the patient's age. 2. There is no evidence of choledocholithiasis. Patient underwent upper endoscopy showed antral ulcer with gastritis  X-ray of the T-spine and L-spine was negative for vertebral fracture  Urine culture grew E. Coli. Patient had a device inserted in the right gluteal muscle 3 years ago to help her to control her urine incontinence .     Patient Active Problem List   Diagnosis    Phalanx, proximal fracture of R small finger     Degenerative arthritis of left knee    Medial meniscus tear    Synovitis of knee    Pseudomeningocele    Difficult airway for intubation    Acute respiratory failure

## 2019-09-27 ENCOUNTER — HOSPITAL ENCOUNTER (OUTPATIENT)
Dept: ULTRASOUND IMAGING | Age: 84
Discharge: HOME | End: 2019-09-27
Payer: MEDICARE

## 2019-09-27 DIAGNOSIS — R10.9 ABDOMINAL PAIN, UNSPECIFIED ABDOMINAL LOCATION: ICD-10-CM

## 2019-09-27 PROCEDURE — 76775 US EXAM ABDO BACK WALL LIM: CPT

## 2019-10-21 PROBLEM — N39.0 UTI (URINARY TRACT INFECTION): Status: RESOLVED | Noted: 2019-09-21 | Resolved: 2019-10-21

## 2019-11-05 ENCOUNTER — HOSPITAL ENCOUNTER (OUTPATIENT)
Dept: ULTRASOUND IMAGING | Age: 84
Discharge: HOME | End: 2019-11-05
Payer: MEDICARE

## 2019-11-05 DIAGNOSIS — M62.838 SPASM OF MUSCLE: ICD-10-CM

## 2019-11-05 DIAGNOSIS — E01.0 THYROMEGALY: ICD-10-CM

## 2019-11-05 PROCEDURE — 76536 US EXAM OF HEAD AND NECK: CPT

## 2019-11-20 RX ORDER — ASPIRIN 81 MG/1
81 TABLET ORAL DAILY
COMMUNITY

## 2019-11-26 ENCOUNTER — ANESTHESIA (OUTPATIENT)
Dept: OPERATING ROOM | Age: 84
End: 2019-11-26
Payer: MEDICARE

## 2019-11-26 ENCOUNTER — ANESTHESIA EVENT (OUTPATIENT)
Dept: OPERATING ROOM | Age: 84
End: 2019-11-26
Payer: MEDICARE

## 2019-11-26 ENCOUNTER — HOSPITAL ENCOUNTER (OUTPATIENT)
Age: 84
Setting detail: OUTPATIENT SURGERY
Discharge: HOME | End: 2019-11-26
Attending: UROLOGY | Admitting: UROLOGY
Payer: MEDICARE

## 2019-11-26 VITALS
WEIGHT: 200 LBS | HEART RATE: 65 BPM | RESPIRATION RATE: 15 BRPM | DIASTOLIC BLOOD PRESSURE: 74 MMHG | SYSTOLIC BLOOD PRESSURE: 137 MMHG | TEMPERATURE: 97 F | BODY MASS INDEX: 34.15 KG/M2 | OXYGEN SATURATION: 95 % | HEIGHT: 64 IN

## 2019-11-26 VITALS — DIASTOLIC BLOOD PRESSURE: 68 MMHG | SYSTOLIC BLOOD PRESSURE: 140 MMHG | OXYGEN SATURATION: 100 %

## 2019-11-26 PROCEDURE — 2709999900 HC NON-CHARGEABLE SUPPLY: Performed by: UROLOGY

## 2019-11-26 PROCEDURE — 2580000003 HC RX 258: Performed by: UROLOGY

## 2019-11-26 PROCEDURE — 2500000003 HC RX 250 WO HCPCS: Performed by: UROLOGY

## 2019-11-26 PROCEDURE — 7100000010 HC PHASE II RECOVERY - FIRST 15 MIN: Performed by: UROLOGY

## 2019-11-26 PROCEDURE — 7100000000 HC PACU RECOVERY - FIRST 15 MIN: Performed by: UROLOGY

## 2019-11-26 PROCEDURE — 7100000011 HC PHASE II RECOVERY - ADDTL 15 MIN: Performed by: UROLOGY

## 2019-11-26 PROCEDURE — 7100000001 HC PACU RECOVERY - ADDTL 15 MIN: Performed by: UROLOGY

## 2019-11-26 PROCEDURE — 6360000002 HC RX W HCPCS

## 2019-11-26 PROCEDURE — 6360000002 HC RX W HCPCS: Performed by: UROLOGY

## 2019-11-26 PROCEDURE — 3600000002 HC SURGERY LEVEL 2 BASE: Performed by: UROLOGY

## 2019-11-26 PROCEDURE — 3700000001 HC ADD 15 MINUTES (ANESTHESIA): Performed by: UROLOGY

## 2019-11-26 PROCEDURE — 3600000012 HC SURGERY LEVEL 2 ADDTL 15MIN: Performed by: UROLOGY

## 2019-11-26 PROCEDURE — 3700000000 HC ANESTHESIA ATTENDED CARE: Performed by: UROLOGY

## 2019-11-26 RX ORDER — PHENAZOPYRIDINE HYDROCHLORIDE 100 MG/1
100 TABLET, FILM COATED ORAL 3 TIMES DAILY PRN
Qty: 30 TABLET | Refills: 0 | Status: SHIPPED | OUTPATIENT
Start: 2019-11-26 | End: 2019-11-29

## 2019-11-26 RX ORDER — PROPOFOL 10 MG/ML
INJECTION, EMULSION INTRAVENOUS CONTINUOUS PRN
Status: DISCONTINUED | OUTPATIENT
Start: 2019-11-26 | End: 2019-11-26 | Stop reason: SDUPTHER

## 2019-11-26 RX ORDER — CIPROFLOXACIN 2 MG/ML
400 INJECTION, SOLUTION INTRAVENOUS
Status: DISCONTINUED | OUTPATIENT
Start: 2019-11-26 | End: 2019-11-26 | Stop reason: HOSPADM

## 2019-11-26 RX ORDER — FENTANYL CITRATE 50 UG/ML
INJECTION, SOLUTION INTRAMUSCULAR; INTRAVENOUS PRN
Status: DISCONTINUED | OUTPATIENT
Start: 2019-11-26 | End: 2019-11-26 | Stop reason: SDUPTHER

## 2019-11-26 RX ORDER — SODIUM CHLORIDE 0.9 % (FLUSH) 0.9 %
10 SYRINGE (ML) INJECTION EVERY 12 HOURS SCHEDULED
Status: DISCONTINUED | OUTPATIENT
Start: 2019-11-26 | End: 2019-11-26 | Stop reason: HOSPADM

## 2019-11-26 RX ORDER — CIPROFLOXACIN 250 MG/1
250 TABLET, FILM COATED ORAL 2 TIMES DAILY
Qty: 6 TABLET | Refills: 0 | Status: SHIPPED | OUTPATIENT
Start: 2019-11-26 | End: 2019-11-29

## 2019-11-26 RX ORDER — IBUPROFEN 800 MG/1
800 TABLET ORAL EVERY 8 HOURS PRN
Qty: 25 TABLET | Refills: 0 | Status: SHIPPED | OUTPATIENT
Start: 2019-11-26

## 2019-11-26 RX ORDER — SODIUM CHLORIDE 9 MG/ML
INJECTION, SOLUTION INTRAVENOUS CONTINUOUS
Status: DISCONTINUED | OUTPATIENT
Start: 2019-11-26 | End: 2019-11-26 | Stop reason: HOSPADM

## 2019-11-26 RX ORDER — CEFAZOLIN SODIUM 1 G/3ML
INJECTION, POWDER, FOR SOLUTION INTRAMUSCULAR; INTRAVENOUS PRN
Status: DISCONTINUED | OUTPATIENT
Start: 2019-11-26 | End: 2019-11-26 | Stop reason: SDUPTHER

## 2019-11-26 RX ORDER — PHENAZOPYRIDINE HYDROCHLORIDE 100 MG/1
100 TABLET, FILM COATED ORAL 3 TIMES DAILY PRN
Status: DISCONTINUED | OUTPATIENT
Start: 2019-11-26 | End: 2019-11-26 | Stop reason: HOSPADM

## 2019-11-26 RX ORDER — BUPIVACAINE HYDROCHLORIDE 5 MG/ML
INJECTION, SOLUTION EPIDURAL; INTRACAUDAL PRN
Status: DISCONTINUED | OUTPATIENT
Start: 2019-11-26 | End: 2019-11-26 | Stop reason: ALTCHOICE

## 2019-11-26 RX ORDER — SODIUM CHLORIDE 0.9 % (FLUSH) 0.9 %
10 SYRINGE (ML) INJECTION PRN
Status: DISCONTINUED | OUTPATIENT
Start: 2019-11-26 | End: 2019-11-26 | Stop reason: HOSPADM

## 2019-11-26 RX ORDER — CIPROFLOXACIN 2 MG/ML
400 INJECTION, SOLUTION INTRAVENOUS
Qty: 200 ML | Refills: 0 | Status: SHIPPED | OUTPATIENT
Start: 2019-11-26 | End: 2019-11-26

## 2019-11-26 RX ORDER — ONDANSETRON 2 MG/ML
4 INJECTION INTRAMUSCULAR; INTRAVENOUS EVERY 6 HOURS PRN
Status: DISCONTINUED | OUTPATIENT
Start: 2019-11-26 | End: 2019-11-26 | Stop reason: HOSPADM

## 2019-11-26 RX ORDER — IBUPROFEN 400 MG/1
800 TABLET ORAL EVERY 6 HOURS PRN
Status: DISCONTINUED | OUTPATIENT
Start: 2019-11-26 | End: 2019-11-26 | Stop reason: HOSPADM

## 2019-11-26 RX ADMIN — FENTANYL CITRATE 20 MCG: 50 INJECTION, SOLUTION INTRAMUSCULAR; INTRAVENOUS at 19:21

## 2019-11-26 RX ADMIN — CEFAZOLIN 2000 MG: 1 INJECTION, POWDER, FOR SOLUTION INTRAMUSCULAR; INTRAVENOUS at 18:19

## 2019-11-26 RX ADMIN — SODIUM CHLORIDE: 9 INJECTION, SOLUTION INTRAVENOUS at 18:14

## 2019-11-26 RX ADMIN — SODIUM CHLORIDE: 9 INJECTION, SOLUTION INTRAVENOUS at 12:32

## 2019-11-26 RX ADMIN — PROPOFOL 50 MCG/KG/MIN: 10 INJECTION, EMULSION INTRAVENOUS at 18:18

## 2019-11-26 RX ADMIN — FENTANYL CITRATE 20 MCG: 50 INJECTION, SOLUTION INTRAMUSCULAR; INTRAVENOUS at 18:40

## 2019-11-26 RX ADMIN — FENTANYL CITRATE 20 MCG: 50 INJECTION, SOLUTION INTRAMUSCULAR; INTRAVENOUS at 18:30

## 2019-11-26 ASSESSMENT — PULMONARY FUNCTION TESTS
PIF_VALUE: 0
PIF_VALUE: 0
PIF_VALUE: 1
PIF_VALUE: 1
PIF_VALUE: 0
PIF_VALUE: 0
PIF_VALUE: 1
PIF_VALUE: 1
PIF_VALUE: 0
PIF_VALUE: 1
PIF_VALUE: 0
PIF_VALUE: 0
PIF_VALUE: 1
PIF_VALUE: 0
PIF_VALUE: 1
PIF_VALUE: 0
PIF_VALUE: 1
PIF_VALUE: 0
PIF_VALUE: 0
PIF_VALUE: 1
PIF_VALUE: 1
PIF_VALUE: 0
PIF_VALUE: 1
PIF_VALUE: 0
PIF_VALUE: 0
PIF_VALUE: 1
PIF_VALUE: 1
PIF_VALUE: 0
PIF_VALUE: 0
PIF_VALUE: 1
PIF_VALUE: 0
PIF_VALUE: 1
PIF_VALUE: 1
PIF_VALUE: 0
PIF_VALUE: 1
PIF_VALUE: 0
PIF_VALUE: 1
PIF_VALUE: 0
PIF_VALUE: 1
PIF_VALUE: 0
PIF_VALUE: 0
PIF_VALUE: 1
PIF_VALUE: 0
PIF_VALUE: 1
PIF_VALUE: 0
PIF_VALUE: 1
PIF_VALUE: 1
PIF_VALUE: 0
PIF_VALUE: 1
PIF_VALUE: 1
PIF_VALUE: 0
PIF_VALUE: 1
PIF_VALUE: 0
PIF_VALUE: 0
PIF_VALUE: 1
PIF_VALUE: 0

## 2019-11-26 ASSESSMENT — PAIN SCALES - GENERAL
PAINLEVEL_OUTOF10: 0

## 2019-11-26 ASSESSMENT — PAIN - FUNCTIONAL ASSESSMENT: PAIN_FUNCTIONAL_ASSESSMENT: 0-10

## 2020-02-06 ENCOUNTER — HOSPITAL ENCOUNTER (OUTPATIENT)
Dept: INFUSION THERAPY | Age: 85
Setting detail: INFUSION SERIES
End: 2020-02-06
Payer: MEDICARE

## 2020-02-18 ENCOUNTER — HOSPITAL ENCOUNTER (OUTPATIENT)
Dept: INFUSION THERAPY | Age: 85
Setting detail: INFUSION SERIES
Discharge: HOME | End: 2020-02-18
Payer: MEDICARE

## 2020-02-18 DIAGNOSIS — M81.0 SENILE OSTEOPOROSIS: Primary | ICD-10-CM

## 2020-02-18 LAB — CALCIUM SERPL-MCNC: 9.7 MG/DL (ref 8.6–10.2)

## 2020-02-18 PROCEDURE — 6360000002 HC RX W HCPCS: Performed by: INTERNAL MEDICINE

## 2020-02-18 PROCEDURE — 36415 COLL VENOUS BLD VENIPUNCTURE: CPT

## 2020-02-18 PROCEDURE — 96372 THER/PROPH/DIAG INJ SC/IM: CPT

## 2020-02-18 PROCEDURE — 82310 ASSAY OF CALCIUM: CPT

## 2020-02-18 RX ADMIN — DENOSUMAB 60 MG: 60 INJECTION SUBCUTANEOUS at 12:41

## 2020-07-24 ENCOUNTER — HOSPITAL ENCOUNTER (OUTPATIENT)
Age: 85
Discharge: HOME | End: 2020-07-26
Payer: MEDICARE

## 2020-07-24 PROCEDURE — U0003 INFECTIOUS AGENT DETECTION BY NUCLEIC ACID (DNA OR RNA); SEVERE ACUTE RESPIRATORY SYNDROME CORONAVIRUS 2 (SARS-COV-2) (CORONAVIRUS DISEASE [COVID-19]), AMPLIFIED PROBE TECHNIQUE, MAKING USE OF HIGH THROUGHPUT TECHNOLOGIES AS DESCRIBED BY CMS-2020-01-R: HCPCS

## 2020-07-25 LAB
SARS-COV-2: NOT DETECTED
SOURCE: NORMAL

## 2020-07-27 ENCOUNTER — PREP FOR PROCEDURE (OUTPATIENT)
Dept: UROLOGY | Age: 85
End: 2020-07-27

## 2020-07-27 RX ORDER — SODIUM CHLORIDE 0.9 % (FLUSH) 0.9 %
10 SYRINGE (ML) INJECTION EVERY 12 HOURS SCHEDULED
Status: CANCELLED | OUTPATIENT
Start: 2020-07-27

## 2020-07-27 RX ORDER — SODIUM CHLORIDE 9 MG/ML
INJECTION, SOLUTION INTRAVENOUS CONTINUOUS
Status: CANCELLED | OUTPATIENT
Start: 2020-07-27

## 2020-07-27 RX ORDER — CIPROFLOXACIN 2 MG/ML
400 INJECTION, SOLUTION INTRAVENOUS
Status: CANCELLED | OUTPATIENT
Start: 2020-07-27 | End: 2020-07-27

## 2020-07-27 RX ORDER — SODIUM CHLORIDE 0.9 % (FLUSH) 0.9 %
10 SYRINGE (ML) INJECTION PRN
Status: CANCELLED | OUTPATIENT
Start: 2020-07-27

## 2020-07-30 ENCOUNTER — HOSPITAL ENCOUNTER (OUTPATIENT)
Age: 85
Setting detail: OUTPATIENT SURGERY
Discharge: HOME | End: 2020-07-30
Attending: UROLOGY | Admitting: UROLOGY
Payer: MEDICARE

## 2020-07-30 ENCOUNTER — ANESTHESIA (OUTPATIENT)
Dept: OPERATING ROOM | Age: 85
End: 2020-07-30
Payer: MEDICARE

## 2020-07-30 ENCOUNTER — ANESTHESIA EVENT (OUTPATIENT)
Dept: OPERATING ROOM | Age: 85
End: 2020-07-30
Payer: MEDICARE

## 2020-07-30 VITALS
RESPIRATION RATE: 16 BRPM | BODY MASS INDEX: 33.32 KG/M2 | DIASTOLIC BLOOD PRESSURE: 62 MMHG | OXYGEN SATURATION: 94 % | TEMPERATURE: 97.9 F | HEIGHT: 65 IN | WEIGHT: 200 LBS | HEART RATE: 76 BPM | SYSTOLIC BLOOD PRESSURE: 107 MMHG

## 2020-07-30 VITALS
SYSTOLIC BLOOD PRESSURE: 103 MMHG | OXYGEN SATURATION: 95 % | DIASTOLIC BLOOD PRESSURE: 57 MMHG | RESPIRATION RATE: 16 BRPM

## 2020-07-30 PROCEDURE — 3700000001 HC ADD 15 MINUTES (ANESTHESIA): Performed by: UROLOGY

## 2020-07-30 PROCEDURE — 6360000002 HC RX W HCPCS: Performed by: UROLOGY

## 2020-07-30 PROCEDURE — 2580000003 HC RX 258: Performed by: UROLOGY

## 2020-07-30 PROCEDURE — 6360000002 HC RX W HCPCS

## 2020-07-30 PROCEDURE — 3600000002 HC SURGERY LEVEL 2 BASE: Performed by: UROLOGY

## 2020-07-30 PROCEDURE — 7100000010 HC PHASE II RECOVERY - FIRST 15 MIN: Performed by: UROLOGY

## 2020-07-30 PROCEDURE — 3600000012 HC SURGERY LEVEL 2 ADDTL 15MIN: Performed by: UROLOGY

## 2020-07-30 PROCEDURE — 6360000002 HC RX W HCPCS: Performed by: NURSE PRACTITIONER

## 2020-07-30 PROCEDURE — 2709999900 HC NON-CHARGEABLE SUPPLY: Performed by: UROLOGY

## 2020-07-30 PROCEDURE — 3700000000 HC ANESTHESIA ATTENDED CARE: Performed by: UROLOGY

## 2020-07-30 PROCEDURE — 7100000011 HC PHASE II RECOVERY - ADDTL 15 MIN: Performed by: UROLOGY

## 2020-07-30 PROCEDURE — 2580000003 HC RX 258: Performed by: NURSE PRACTITIONER

## 2020-07-30 RX ORDER — PROPOFOL 10 MG/ML
INJECTION, EMULSION INTRAVENOUS CONTINUOUS PRN
Status: DISCONTINUED | OUTPATIENT
Start: 2020-07-30 | End: 2020-07-30 | Stop reason: SDUPTHER

## 2020-07-30 RX ORDER — SODIUM CHLORIDE 0.9 % (FLUSH) 0.9 %
10 SYRINGE (ML) INJECTION PRN
Status: DISCONTINUED | OUTPATIENT
Start: 2020-07-30 | End: 2020-07-30 | Stop reason: HOSPADM

## 2020-07-30 RX ORDER — FENTANYL CITRATE 50 UG/ML
INJECTION, SOLUTION INTRAMUSCULAR; INTRAVENOUS PRN
Status: DISCONTINUED | OUTPATIENT
Start: 2020-07-30 | End: 2020-07-30 | Stop reason: SDUPTHER

## 2020-07-30 RX ORDER — PHENAZOPYRIDINE HYDROCHLORIDE 100 MG/1
100 TABLET, FILM COATED ORAL 3 TIMES DAILY PRN
Qty: 20 TABLET | Refills: 0 | Status: SHIPPED | OUTPATIENT
Start: 2020-07-30 | End: 2020-08-06

## 2020-07-30 RX ORDER — MEPERIDINE HYDROCHLORIDE 25 MG/ML
12.5 INJECTION INTRAMUSCULAR; INTRAVENOUS; SUBCUTANEOUS EVERY 5 MIN PRN
Status: DISCONTINUED | OUTPATIENT
Start: 2020-07-30 | End: 2020-07-30 | Stop reason: HOSPADM

## 2020-07-30 RX ORDER — IBUPROFEN 400 MG/1
400 TABLET ORAL EVERY 8 HOURS PRN
Status: DISCONTINUED | OUTPATIENT
Start: 2020-07-30 | End: 2020-07-30 | Stop reason: HOSPADM

## 2020-07-30 RX ORDER — PHENAZOPYRIDINE HYDROCHLORIDE 100 MG/1
100 TABLET, FILM COATED ORAL 3 TIMES DAILY PRN
Status: DISCONTINUED | OUTPATIENT
Start: 2020-07-30 | End: 2020-07-30 | Stop reason: HOSPADM

## 2020-07-30 RX ORDER — ONDANSETRON 2 MG/ML
4 INJECTION INTRAMUSCULAR; INTRAVENOUS EVERY 6 HOURS PRN
Status: DISCONTINUED | OUTPATIENT
Start: 2020-07-30 | End: 2020-07-30 | Stop reason: HOSPADM

## 2020-07-30 RX ORDER — CIPROFLOXACIN 2 MG/ML
400 INJECTION, SOLUTION INTRAVENOUS
Status: COMPLETED | OUTPATIENT
Start: 2020-07-30 | End: 2020-07-30

## 2020-07-30 RX ORDER — SODIUM CHLORIDE 0.9 % (FLUSH) 0.9 %
10 SYRINGE (ML) INJECTION EVERY 12 HOURS SCHEDULED
Status: DISCONTINUED | OUTPATIENT
Start: 2020-07-30 | End: 2020-07-30 | Stop reason: HOSPADM

## 2020-07-30 RX ORDER — SODIUM CHLORIDE 9 MG/ML
INJECTION, SOLUTION INTRAVENOUS CONTINUOUS
Status: DISCONTINUED | OUTPATIENT
Start: 2020-07-30 | End: 2020-07-30 | Stop reason: HOSPADM

## 2020-07-30 RX ORDER — SODIUM CHLORIDE 9 MG/ML
INJECTION INTRAVENOUS PRN
Status: DISCONTINUED | OUTPATIENT
Start: 2020-07-30 | End: 2020-07-30 | Stop reason: ALTCHOICE

## 2020-07-30 RX ADMIN — FENTANYL CITRATE 50 MCG: 50 INJECTION, SOLUTION INTRAMUSCULAR; INTRAVENOUS at 14:21

## 2020-07-30 RX ADMIN — CIPROFLOXACIN 400 MG: 2 INJECTION, SOLUTION INTRAVENOUS at 13:06

## 2020-07-30 RX ADMIN — SODIUM CHLORIDE: 9 INJECTION, SOLUTION INTRAVENOUS at 12:31

## 2020-07-30 RX ADMIN — FENTANYL CITRATE 50 MCG: 50 INJECTION, SOLUTION INTRAMUSCULAR; INTRAVENOUS at 14:14

## 2020-07-30 RX ADMIN — PROPOFOL 100 MCG/KG/MIN: 10 INJECTION, EMULSION INTRAVENOUS at 14:15

## 2020-07-30 ASSESSMENT — PAIN SCALES - GENERAL
PAINLEVEL_OUTOF10: 0

## 2020-07-30 ASSESSMENT — PAIN - FUNCTIONAL ASSESSMENT: PAIN_FUNCTIONAL_ASSESSMENT: 0-10

## 2020-07-30 NOTE — BRIEF OP NOTE
Brief Postoperative Note      Patient: Elin Mckenzie  YOB: 1932  MRN: [id number]    Date of Procedure: 7/30/2020    Pre-Op Diagnosis: Overactive bladder, mixed urinary incontinence    Post-Op Diagnosis: Same       Procedure(s):  CYSTOSCOPY, BOTOX  INJECTION 300 UNITS    Surgeon(s):  Dago Sorensen MD    Assistant:  * No surgical staff found *    Anesthesia: Monitor Anesthesia Care    Estimated Blood Loss (mL): Minimal    Complications: None    Specimens:   * No specimens in log *    Implants:  * No implants in log *      Drains: * No LDAs found *    Findings: None    Electronically signed by Dago Sorensen MD on 7/30/2020 at 2:40 PM

## 2020-07-30 NOTE — PROGRESS NOTES
Covid testing done  Results negative  Self quarantine guidelines have been maintained  No unusual signs or symptoms to report  Screening questionnaire completed
Discharge instructions given.  Pt verbalized understanding of discharge instructions
Left messages on phones to come 1200
Patient made aware to get covid test done on 7/24, and to self isolate, going to [address] [facility],[address].
Spoke with JG Antunez regarding pt being discharged and having to void before discharge.  [id number] Gladis Pham for pt to discharge without voiding
about your blood sugar [phone]. [] Use your inhalers the morning of surgery. Bring your emergency inhaler with you day of surgery. [x] Follow physician instructions regarding any blood thinners you may be taking. WHAT TO EXPECT:  [x] The day of surgery you will be greeted and checked in by the [facility] [employer].  In addition, you will be registered in the [facility] by a Patient Access Representative. Please bring your photo ID and insurance card. A nurse will greet you in accordance to the time you are needed in the pre-op area to prepare you for surgery. Please do not be discouraged if you are not greeted in the order you arrive as there are many variables that are involved in patient preparation. Your patience is greatly appreciated as you wait for your nurse. Please bring in items such as: books, magazines, newspapers, electronics, or any other items  to occupy your time in the waiting area. []  Delays may occur with surgery and staff will make a sincere effort to keep you informed of delays. If any delays occur with your procedure, we apologize ahead of time for your inconvenience as we recognize the value of your time.

## 2020-07-30 NOTE — H&P
S/P INTRAVESICAL BOTOX 200 UNITS WITH INTERSTIM REMOVAL ON 11/26/19   S/P INTRAVESICAL BOTOX (300 UNITS) ON 12/22/15   SHE IS INCONTINENT AT ALL TIMES   SHE DOES NOT FEEL THE URGE TO URINATE   URINE \"CONSTANTLY POURS OUT OF HER\"   SHE DID NOT FEEL THAT THE INTERSTIM WORKING AT ALL FOR HER   + INCONTINENCE   - DYSURIA   + FREQUENCY   - PAIN   - UTI'S   - HEMATURIA   + NOCTURIA TIMES X1   SHE WEARS 3 DEPENDS A DAY   NO OTHER URINARY ISSUES   SHE WEARS RUBBER PANTS AT NITE   10/4/16--S/P INTERSTIM STAGE 1 & 2   SHE DRINKS A LOT OF WATER        CC: I have an overactive bladder. HPI: Sascha Bhagat is a 80year-old female established patient who is here for evaluation of their overactive bladder. Her voiding symptoms have not changed. She is not taking any medication for over active bladder. She does have urgency. She does have problems getting to the bathroom in time after she has the urge to urinate. She does urinate more frequently than once every 4 hours in the daytime. She is not satisfied with the wayshe is voiding. She does wear protective pads. She wears 2-3 pads per day. She is not having problems with emptying her bladder well. ALLERGIES: Morphine Sulfate  Penicillin  Vitamin K       MEDICATIONS: Antivert   Aspir 81   Calcium   Compazine   Multivitamins   Norvasc   Synthroid   Zocor   Zoloft         PSH: Cystourethroscopy, W/Injection For Chemodenervation Of Bladder, BOTOX 200 UNITS - 11/26/2019 - at [address] [place] [address]. [place] - [address], BOTOX - 2015, 2015, 2014, 2014, 2013, 2013, 2013  Interstim Removal - 11/26/2019 - at [address]. [place] - [address]  Interstim Stage 1 - 2016  Interstim Stage 2 - 2016         PSH Notes: S/P REPAIR OF DURAL TEAR     NON- PSH: Appendectomy  Cholecystectomy  Colonoscopy - about 2012  Hemorrhoidectomy  Hysterectomy  Lumbar Laminectomy, DR. CONTEH  Tonsillectomy  Wrist Arthroscopy/surgery, Left        PMH: Overactive bladder (Improving), S/P STAGE II INTERSTIM ON 10/4/16 - 2016  Mixed incontinence - 2012  Urinary Tract Inf, Unspec site - 2012       NON- PMH: Diarrhea, unspecified  Essential (primary) hypertension  Hypothyroidism, unspecified  Major depressive disorder, single episode, unspecified  Obesity, unspecified  Pure hypercholesterolemia  Vertigo of central origin, unspecified ear       FAMILY HISTORY: Breast Cancer - Sister  LEUKEMIA - Brother  NON Arlis [place] - Father     SOCIAL HISTORY: Marital Status:   Preferred Language: English; Ethnicity: Not  Or ; Race: White  Current Smoking Status: Patient has never smoked. Does not use smokeless tobacco.  Types of alcohol consumed: Wine. Social Drinker. Does not use drugs. Does not drink caffeine. Has had a blood transfusion. 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 Notes: FOUR CHILDREN     REVIEW OF SYSTEMS:     Constitutional:   Patient denies fever, chills, and weight loss. Eyes:   Patient reports wearing glasses. Ears, Nose, Mouth, Throat:   Patient denies hearing loss. Cardiovascular:   Patient denies chest pains, swollen ankles, and irregular heartbeat. Respiratory:   Patient denies shortness of breath, wheezing, and chronic cough. Gastrointestinal:   Patient denies abdominal pain, nausea/vomiting, and change in bowels. Genitourinary:   Patient reports incontinence. Patient denies painful urination, blood in urine, ochoa catheter, and suprapubic catheter. Musculoskeletal:   Patient reports using cane. Patient denies using walker and using wheelchair. Integumentary/Skin:   Patient denies rash, persistent itching, and skin cancer history. Neurological:   Patient denies numbness, tingling, dizziness, and altered mental status. Hematologic/Lymphatic:   Patient denies swollen glands, abnormal bleeding, and history blood transfusion. Notes: Reviewed previous review of systems 11/04/2019. No changes.      VITAL SIGNS:       Weight 200 lb / 90.72 kg   Height 63 in / 160.02 cm   /72 mmHg   Pulse 82 /min   Resp. Rate 18 /min   BMI 35.4 kg/m²     MULTI-SYSTEM PHYSICAL EXAMINATION:     Constitutional: Obese. No physical deformities. Normally developed. Good grooming. WALKS WITH A CANE   Neck: Neck symmetrical, not swollen. Normal tracheal position. Respiratory: No labored breathing, no use of accessory muscles. Cardiovascular: Normal temperature, normal extremity pulses, no swelling, no varicosities. Skin: No paleness, no jaundice, no cyanosis. No lesion, no ulcer, no rash. Neurologic / Psychiatric: Oriented to time, oriented to place, oriented to person. No depression, no anxiety, no agitation. Gastrointestinal: No mass, no tenderness, no rigidity, non obese abdomen. RIGHT UPPER BUTTOCKS INCISION IS CLEAN, DRY, INTACT   Eyes: Normal conjunctivae. Normal eyelids. Ears, Nose, Mouth, and Throat: Left ear no scars, no lesions, no masses. Right ear no scars, no lesions, no masses. Nose no scars, no lesions, no masses. Normal hearing. Normal lips. Musculoskeletal: Normal gait and station of head and neck. PAST DATA REVIEWED:   Source Of History:  Patient   Records Review:   Previous Patient Records     PROCEDURES: None     ASSESSMENT:       ICD-10 Details   1 :   Overactive bladder - N32.81 Worsening - S/P STAGE II INTERSTIM ON 10/4/16 WITH EXPLANT ON 11/26/19     PLAN:   CYSTO/BOTOX INJECTION         The patient and I talked at length about conservative treatment of overactive bladder. Etiologies of overactive bladder including neurogenic bladder, stroke, Parkinson's disease, multiple sclerosis, neurological diseases, cystitis, UTI's, anxiety, extensive water drinking, and others were discussed with the patient. Alternative treatment options were discussed with the patient in detail. All questions were answered.     The patient gave fully informed consent to proceed with conservative therapy for their overactive bladder. The patient was given instructions to call for abdominal pain, pelvic pain, perirectal pain, nausea, vomiting, diarrhea, fever over 100? ?F, chills, hematuria, dysuria, frequency, urgency, or urge incontinence.        SHE SAW DR. Gary COTE [facility] RECENTLY WHO RECOMMENDED REPEAT BOTOX INJECTION

## 2020-07-30 NOTE — ANESTHESIA PRE PROCEDURE
Department of Anesthesiology  Preprocedure Note       Name:  Vel Nixon   Age:  80 y.o.  :  1932                                          MRN:  [id number]         Date:  2020      Surgeon: Maggie Chu):  Cece Palm MD    Procedure: CYSTOSCOPY, RETROGRADE PYELOGRAM,  BOTOX  INJECTION 200 UNITS (N/A )    Medications prior to admission:   Prior to Admission medications    Medication Sig Start Date End Date Taking? Authorizing Provider   ibuprofen (ADVIL;MOTRIN) 800 MG tablet Take 1 tablet by mouth every 8 hours as needed for Pain 19   Cece Palm MD   VITAMIN E PO Take 1 capsule by mouth daily    Historical Provider, MD   aspirin 81 MG EC tablet Take 81 mg by mouth daily    Historical Provider, MD   amLODIPine-benazepril (LOTREL) 5-10 MG per capsule Take 1 capsule by mouth 2 times daily    Historical Provider, MD   levothyroxine (SYNTHROID) 50 MCG tablet Take 50 mcg by mouth Daily    Historical Provider, MD   pantoprazole (PROTONIX) 40 MG tablet Take 40 mg by mouth daily    Historical Provider, MD   simvastatin (ZOCOR) 20 MG tablet Take 20 mg by mouth daily    Historical Provider, MD       Current medications:    Current Outpatient Medications   Medication Sig Dispense Refill    ibuprofen (ADVIL;MOTRIN) 800 MG tablet Take 1 tablet by mouth every 8 hours as needed for Pain 25 tablet 0    VITAMIN E PO Take 1 capsule by mouth daily      aspirin 81 MG EC tablet Take 81 mg by mouth daily      amLODIPine-benazepril (LOTREL) 5-10 MG per capsule Take 1 capsule by mouth 2 times daily      levothyroxine (SYNTHROID) 50 MCG tablet Take 50 mcg by mouth Daily      pantoprazole (PROTONIX) 40 MG tablet Take 40 mg by mouth daily      simvastatin (ZOCOR) 20 MG tablet Take 20 mg by mouth daily       No current facility-administered medications for this visit. 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 4XXA    Acute respiratory failure (HCC) J96.00    Allergic reaction caused by a drug-morphine T78.40XA    Lumbosacral spondylosis without myelopathy M47.817    Postlaminectomy syndrome, lumbar region M96.1    Chronic back pain M54.9, G89.29    Depression F32.9    Hypothyroid E03.9    Respiratory distress R06.03    Senile osteoporosis M81.0    RUQ abdominal pain R10.11    Gastric ulcer due to nonsteroidal anti-inflammatory drug (NSAID) K25.9, T39.395A       Past Medical History:        Diagnosis Date    Allergy     Arthritis     Chest pain 6-    lexiscan stress test    Chronic back pain     Depression     Headache(784.0)     History of blood transfusion     Hyperlipidemia     Hypertension     Hypothyroid     Incontinence of urine     pt has implant in bladder Clickatell model IM [id number]    Memory changes     Pneumonia     Thyroid disease     Urinary tract infection     Varicella     Vision abnormalities        Past Surgical History:        Procedure Laterality Date    APPENDECTOMY      BACK SURGERY      BLADDER SURGERY      CHOLECYSTECTOMY      CLOSED REDUCTION OF A JOINT  10-    right little finger    CYSTOSCOPY  [id number]    BOTOX INJECTION    CYSTOSCOPY  6/10/14    Bladder botox injection    CYSTOSCOPY  12/9/14    Botox injection bladder    CYSTOSCOPY  [id number]    BOTOX    CYSTOSCOPY  [id number]    BOTOX    CYSTOSCOPY N/A 11/26/2019    CYSTOSCOPY  BOTOX 200 UNITS INJECTION--INTERSTIM FULL EXPLANT performed by Dickson Bolaños MD at [place] [address] ECHO COMPL W DOP COLOR FLOW  7/20/2012         FRACTURE SURGERY      HEMORRHOID SURGERY  40 yrs ago    intravenous. BIS  MIPS: Postoperative opioids intended. Anesthetic plan and risks discussed with patient. Plan discussed with CRNA.     Attending anesthesiologist reviewed and agrees with Susan Faust MD   7/30/2020

## 2020-07-30 NOTE — OP NOTE
[address] [place] [address].  Urology Post-operative Note    Gladis Chandler  YOB: 1932  [id number]    Pre-operative Diagnosis: Overactive bladder, mixed urinary incontinence    Post-operative Diagnosis:  Same    Procedure: Cystourethroscopy, intravesical Botox (300 units) injection    Surgeon: Heidy Sawyer MD    Anesthesia: [facility]    Estimated Blood Loss: Minimal    Complications: None    Drains: None    Specimen(s): None    Clinical Note: 51-year-old female with chronic refractory overactive voiding habits and mixed urinary incontinence. She is failed oral therapy as well as a Medtronic InterStim and low-dose Botox in the past.  She was seen by Dr. Ibrahima Willoughby at the [facility] recently who recommended she have intravesical Botox every 3 months at a dose of 300 units. She presents for the above listed procedure. The risks and benefits of the procedure including but not limited to infection, bleeding, retention, bladder perforation or injury, need for subsequent injections etc. were discussed in detail and she elected to proceed    Operative Description: She was taken to the operating room and placed in the OR table in the supine position. She received IV Cipro preoperatively. Following duction of anesthesia she was repositioned into the dorsolithotomy position. Her external genitalia and abdomen were then prepped and draped sterilely. A 21 Luxembourgish cystoscope with 30 degree lens was inserted per urethra and into the bladder. There were no urethral strictures false passages or tumors. Panendoscopic valuation of the bladder showed no clots stones tumors or foreign bodies. Both ureteral orifice ease were in the usual position on the trigone and there was clear reflux of urine from each. The bladder showed trabeculation and cellules and was consistent with a neurogenic end-stage bladder for the most part. A flexible needle and sheath was inserted through the cystoscope.   300 units of Botox was

## 2020-08-14 ENCOUNTER — HOSPITAL ENCOUNTER (OUTPATIENT)
Dept: INFUSION THERAPY | Age: 85
Setting detail: INFUSION SERIES
Discharge: HOME | End: 2020-08-14
Payer: MEDICARE

## 2020-08-14 VITALS
RESPIRATION RATE: 18 BRPM | DIASTOLIC BLOOD PRESSURE: 67 MMHG | SYSTOLIC BLOOD PRESSURE: 145 MMHG | TEMPERATURE: 97.9 F | OXYGEN SATURATION: 95 % | HEART RATE: 80 BPM

## 2020-08-14 DIAGNOSIS — M81.0 SENILE OSTEOPOROSIS: Primary | ICD-10-CM

## 2020-08-14 LAB — CALCIUM SERPL-MCNC: 9.3 MG/DL (ref 8.6–10.2)

## 2020-08-14 PROCEDURE — 36415 COLL VENOUS BLD VENIPUNCTURE: CPT

## 2020-08-14 PROCEDURE — 6360000002 HC RX W HCPCS: Performed by: INTERNAL MEDICINE

## 2020-08-14 PROCEDURE — 82310 ASSAY OF CALCIUM: CPT

## 2020-08-14 PROCEDURE — 96372 THER/PROPH/DIAG INJ SC/IM: CPT

## 2020-08-14 RX ADMIN — DENOSUMAB 60 MG: 60 INJECTION SUBCUTANEOUS at 09:41

## 2020-10-29 ENCOUNTER — HOSPITAL ENCOUNTER (OUTPATIENT)
Dept: GENERAL RADIOLOGY | Age: 85
Discharge: HOME | End: 2020-10-31
Payer: MEDICARE

## 2020-10-29 ENCOUNTER — HOSPITAL ENCOUNTER (OUTPATIENT)
Age: 85
Discharge: HOME | End: 2020-10-31
Payer: MEDICARE

## 2020-10-29 PROCEDURE — 71046 X-RAY EXAM CHEST 2 VIEWS: CPT

## 2020-10-29 PROCEDURE — 72072 X-RAY EXAM THORAC SPINE 3VWS: CPT

## 2020-10-29 PROCEDURE — 71100 X-RAY EXAM RIBS UNI 2 VIEWS: CPT

## 2020-11-19 NOTE — H&P
[address]                              HISTORY AND PHYSICAL    PATIENT NAME: José Antonio Self                     :        1932  MED REC NO:   [id number]                            ROOM:  ACCOUNT NO:   [de-identified]                           ADMIT DATE: 2020  PROVIDER:     Shalonda Mcguire MD    CHIEF COMPLAINT:  Diarrhea. HISTORY OF PRESENT ILLNESS:  The patient is an 80-year-old. Presents  with diarrhea, four months with episode of incontinence. ALLERGIES:  PENICILLIN, VITAMIN K, and MORPHINE. HABITS:  Denies. CURRENT MEDICATIONS:  Zocor 20 mg daily, Synthroid 50 mcg daily, Norvasc  10 mg daily, Zoloft 100 mg daily, Antivert p.r.n., aspirin 81 mg daily,  and multivitamins. PAST MEDICAL HISTORY:  Gait disability, hypothyroidism, and  hypertension. PAST SURGICAL HISTORY:  Cholecystectomy, ALEXANDRA, appendectomy, tonsils, and  hemorrhoids. PHYSICAL EXAMINATION:  GENERAL:  Alert and oriented. VITAL SIGNS:  /70, pulse 72, respirations 16, and temperature  97.1. HEENT:  Oral cavity negative. NECK:  Negative. The peripheral lymph nodes are not palpable. LUNGS:  Clear to auscultation. BREASTS:  Deferred. HEART:  Heart sounds regular. ABDOMEN:  Soft. No palpable masses. RECTAL:  Negative. GENITALIA:  Deferred. EXTREMITIES:  Negative. NEUROLOGIC:  Oriented to time and space. No gross deficit. IMPRESSION:  Diarrheal illness. PLAN:  Colonoscopy and biopsies.         Aleisha Lombardo MD    D: 2020 14:08:39       T: 2020 15:05:27     [email or web address]  Job#: [id number]     Doc#: [id number]    CC:

## 2020-11-20 ENCOUNTER — HOSPITAL ENCOUNTER (OUTPATIENT)
Age: 85
Discharge: HOME | End: 2020-11-22
Payer: MEDICARE

## 2020-11-20 PROCEDURE — U0003 INFECTIOUS AGENT DETECTION BY NUCLEIC ACID (DNA OR RNA); SEVERE ACUTE RESPIRATORY SYNDROME CORONAVIRUS 2 (SARS-COV-2) (CORONAVIRUS DISEASE [COVID-19]), AMPLIFIED PROBE TECHNIQUE, MAKING USE OF HIGH THROUGHPUT TECHNOLOGIES AS DESCRIBED BY CMS-2020-01-R: HCPCS

## 2020-11-22 LAB — SARS-COV-2, PCR: NOT DETECTED

## 2020-11-24 NOTE — PROGRESS NOTES
[address] [facility]                                                                                                                    PRE OP INSTRUCTIONS FOR  Laurne Clark        Date: 11/24/2020    Date of surgery:  11/25/2020     Arrival Time: Hospital will call you between 5pm and 7pm with your final arrival time for surgery    1. Do not eat or drink anything after   Midnight  prior to surgery. This includes no water, chewing gum, mints or ice chips. 2. Take the following medications with a small sip of water on the morning of Surgery:      3. Diabetics may take evening dose of insulin but none after midnight. If you feel symptomatic or low blood sugar morning of surgery drink 1-2 ounces of apple juice only. 4. Aspirin, Ibuprofen, Advil, Naproxen, Vitamin E and other Anti-inflammatory products should be stopped  before surgery  as directed by your physician. Take Tylenol only unless instructed otherwise by your surgeon. 5. Check with your Doctor regarding stopping Plavix, Coumadin, Lovenox, Eliquis, Effient, or other blood thinners. 6. Do not smoke,use illicit drugs and do not drink any alcoholic beverages 24 hours prior to surgery. 7. You may brush your teeth the morning of surgery. DO NOT SWALLOW WATER    8. You MUST make arrangements for a responsible adult to take you home after your surgery. You will not be allowed to leave alone or drive yourself home. It is strongly suggested someone stay with you the first 24 hrs. Your surgery will be cancelled if you do not have a ride home. 9. PEDIATRIC PATIENTS ONLY:  A parent/legal guardian must accompany a child scheduled for surgery and plan to stay at the hospital until the child is discharged. Please do not bring other children with you.     10. Please wear simple, loose fitting clothing to the hospital.  Reino Apley not bring valuables (money, credit cards, checkbooks, etc.) Do not wear any makeup (including no eye makeup) or nail polish on your fingers or toes. 11. DO NOT wear any jewelry or piercings on day of surgery. All body piercing jewelry must be removed. 12. Shower the night before surgery with _x__Antibacterial soap /KENJI WIPES________    13. TOTAL JOINT REPLACEMENT/HYSTERECTOMY PATIENTS ONLY---Remember to bring Blood Bank bracelet to the hospital on the day of surgery. 14. If you have a Living Will and Durable Power of  for Healthcare, please bring in a copy. 15. If appropriate bring crutches, inspirex, WALKER, CANE etc... 12. Notify your Surgeon if you develop any illness between now and surgery time, cough, cold, fever, sore throat, nausea, vomiting, etc.  Please notify your surgeon if you experience dizziness, shortness of breath or blurred vision between now & the time of your surgery. 17. If you have ___dentures, they will be removed before going to the OR; we will provide you a container. If you wear ___contact lenses or ___glasses, they will be removed; please bring a case for them. 18. To provide excellent care visitors will be limited to 2 in the room at any given time. 19. Please bring picture ID and insurance card. 20. Sleep apnea patients need to bring CPAP AND SETTINGS to hospital on day of surgery. 21. During flu season no children under the age of 15 are permitted in the hospital for the safety of all patients. 22. Other                  Please call AMBULATORY CARE if you have any further questions.    [facility]     [address]

## 2020-11-25 ENCOUNTER — ANESTHESIA (OUTPATIENT)
Dept: ENDOSCOPY | Age: 85
End: 2020-11-25
Payer: MEDICARE

## 2020-11-25 ENCOUNTER — HOSPITAL ENCOUNTER (OUTPATIENT)
Age: 85
Setting detail: OUTPATIENT SURGERY
Discharge: HOME | End: 2020-11-25
Attending: INTERNAL MEDICINE | Admitting: INTERNAL MEDICINE
Payer: MEDICARE

## 2020-11-25 ENCOUNTER — ANESTHESIA EVENT (OUTPATIENT)
Dept: ENDOSCOPY | Age: 85
End: 2020-11-25
Payer: MEDICARE

## 2020-11-25 VITALS
TEMPERATURE: 97.6 F | HEIGHT: 64 IN | HEART RATE: 60 BPM | SYSTOLIC BLOOD PRESSURE: 149 MMHG | WEIGHT: 200 LBS | DIASTOLIC BLOOD PRESSURE: 71 MMHG | OXYGEN SATURATION: 98 % | RESPIRATION RATE: 18 BRPM | BODY MASS INDEX: 34.15 KG/M2

## 2020-11-25 VITALS
RESPIRATION RATE: 19 BRPM | OXYGEN SATURATION: 98 % | SYSTOLIC BLOOD PRESSURE: 141 MMHG | DIASTOLIC BLOOD PRESSURE: 89 MMHG

## 2020-11-25 PROCEDURE — 2580000003 HC RX 258: Performed by: ANESTHESIOLOGY

## 2020-11-25 PROCEDURE — 3609012400 HC EGD TRANSORAL BIOPSY SINGLE/MULTIPLE: Performed by: INTERNAL MEDICINE

## 2020-11-25 PROCEDURE — 3700000001 HC ADD 15 MINUTES (ANESTHESIA): Performed by: INTERNAL MEDICINE

## 2020-11-25 PROCEDURE — 2709999900 HC NON-CHARGEABLE SUPPLY: Performed by: INTERNAL MEDICINE

## 2020-11-25 PROCEDURE — 6360000002 HC RX W HCPCS: Performed by: NURSE ANESTHETIST, CERTIFIED REGISTERED

## 2020-11-25 PROCEDURE — 7100000011 HC PHASE II RECOVERY - ADDTL 15 MIN: Performed by: INTERNAL MEDICINE

## 2020-11-25 PROCEDURE — 7100000010 HC PHASE II RECOVERY - FIRST 15 MIN: Performed by: INTERNAL MEDICINE

## 2020-11-25 PROCEDURE — 3700000000 HC ANESTHESIA ATTENDED CARE: Performed by: INTERNAL MEDICINE

## 2020-11-25 PROCEDURE — 88305 TISSUE EXAM BY PATHOLOGIST: CPT

## 2020-11-25 RX ORDER — SODIUM CHLORIDE 0.9 % (FLUSH) 0.9 %
10 SYRINGE (ML) INJECTION PRN
Status: DISCONTINUED | OUTPATIENT
Start: 2020-11-25 | End: 2020-11-25 | Stop reason: HOSPADM

## 2020-11-25 RX ORDER — PROPOFOL 10 MG/ML
INJECTION, EMULSION INTRAVENOUS PRN
Status: DISCONTINUED | OUTPATIENT
Start: 2020-11-25 | End: 2020-11-25 | Stop reason: SDUPTHER

## 2020-11-25 RX ORDER — SODIUM CHLORIDE 0.9 % (FLUSH) 0.9 %
10 SYRINGE (ML) INJECTION EVERY 12 HOURS SCHEDULED
Status: DISCONTINUED | OUTPATIENT
Start: 2020-11-25 | End: 2020-11-25 | Stop reason: HOSPADM

## 2020-11-25 RX ORDER — SODIUM CHLORIDE, SODIUM LACTATE, POTASSIUM CHLORIDE, CALCIUM CHLORIDE 600; 310; 30; 20 MG/100ML; MG/100ML; MG/100ML; MG/100ML
INJECTION, SOLUTION INTRAVENOUS CONTINUOUS
Status: DISCONTINUED | OUTPATIENT
Start: 2020-11-25 | End: 2020-11-25 | Stop reason: HOSPADM

## 2020-11-25 RX ADMIN — SODIUM CHLORIDE, POTASSIUM CHLORIDE, SODIUM LACTATE AND CALCIUM CHLORIDE: 600; 310; 30; 20 INJECTION, SOLUTION INTRAVENOUS at 09:15

## 2020-11-25 RX ADMIN — SODIUM CHLORIDE, POTASSIUM CHLORIDE, SODIUM LACTATE AND CALCIUM CHLORIDE: 600; 310; 30; 20 INJECTION, SOLUTION INTRAVENOUS at 08:33

## 2020-11-25 RX ADMIN — PROPOFOL 300 MG: 10 INJECTION, EMULSION INTRAVENOUS at 09:49

## 2020-11-25 ASSESSMENT — PAIN SCALES - GENERAL: PAINLEVEL_OUTOF10: 0

## 2020-11-25 ASSESSMENT — PAIN - FUNCTIONAL ASSESSMENT: PAIN_FUNCTIONAL_ASSESSMENT: 0-10

## 2020-11-25 NOTE — H&P
H&p reviewed. No changes. Blood pressure (!) 148/68, pulse 74, temperature 98 °F (36.7 °C), temperature source Infrared, resp. rate 16, height 5' 4\" (1.626 m), weight 200 lb (90.7 kg), SpO2 95 %. The patient was counseled at length about the risks of timur Covid-19 during their perioperative period and any recovery window from their procedure. The patient was made aware that timur Covid-19  may worsen their prognosis for recovering from their procedure  and lend to a higher morbidity and/or mortality risk. All material risks, benefits, and reasonable alternatives including postponing the procedure were discussed. The patient does wish to proceed with the procedure at this time.

## 2020-11-25 NOTE — ANESTHESIA POSTPROCEDURE EVALUATION
Department of Anesthesiology  Postprocedure Note    Patient: Anu Ying  MRN: [id number]  YOB: 1932  Date of evaluation: 11/25/2020  Time:  4:25 PM     Procedure Summary     Date:  11/25/20 Room / Location:  [address] [place] [address] 01 / [address] [place] [address]    Anesthesia Start:  0915 Anesthesia Stop:  5025    Procedure:  EGD BIOPSY (N/A ) Diagnosis:  (DIARRHEA)    Surgeon:  Mandeep Rubin MD Responsible Provider:  Jonel Romero MD    Anesthesia Type:  MAC ASA Status:  3          Anesthesia Type: MAC    Osito Phase I: Osito Score: 10    Osito Phase II: Osito Score: 10    Last vitals: Reviewed and per EMR flowsheets.        Anesthesia Post Evaluation    Patient participation: complete - patient participated  Level of consciousness: awake  Airway patency: patent  Nausea & Vomiting: no nausea and no vomiting  Complications: no  Cardiovascular status: hemodynamically stable  Respiratory status: acceptable  Hydration status: stable

## 2020-11-25 NOTE — ANESTHESIA PRE PROCEDURE
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 4XXA    Acute respiratory failure (HCC) J96.00    Allergic reaction caused by a drug-morphine T78.40XA    Lumbosacral spondylosis without myelopathy M47.817    Postlaminectomy syndrome, lumbar region M96.1    Chronic back pain M54.9, G89.29    Depression F32.9    Hypothyroid E03.9    Respiratory distress R06.03    Senile osteoporosis M81.0    RUQ abdominal pain R10.11    Gastric ulcer due to nonsteroidal anti-inflammatory drug (NSAID) K25.9, T39.395A       Past Medical History:        Diagnosis Date    Allergy     Arthritis     Chest pain 6-    lexiscan stress test    Chronic back pain     Depression     Headache(784.0)     History of blood transfusion     Hyperlipidemia     Hypertension     Hypothyroid     Incontinence of urine     pt has implant in bladder KuGou model IM 3058    Memory changes     Pneumonia     Thyroid disease     Urinary tract infection     Varicella     Vision abnormalities        Past Surgical History:        Procedure Laterality Date    APPENDECTOMY      BACK SURGERY      BLADDER SURGERY      CHOLECYSTECTOMY      CLOSED REDUCTION OF A JOINT  10-    right little finger    CYSTOSCOPY  [id number]    BOTOX INJECTION    CYSTOSCOPY  6/10/14    Bladder botox injection    CYSTOSCOPY  12/9/14    Botox injection bladder    CYSTOSCOPY  [id number]    BOTOX    CYSTOSCOPY  [id number]    BOTOX    CYSTOSCOPY N/A 11/26/2019    CYSTOSCOPY  BOTOX 200 UNITS INJECTION--INTERSTIM FULL EXPLANT performed by Jamison Rowland MD at [address] [place] [address] N/A 7/30/2020    CYSTOSCOPY, BOTOX  INJECTION 300 UNITS performed by You Palma MD [place] at [facility] 22 ECHO COMPL W DOP COLOR FLOW  7/20/2012         FRACTURE SURGERY      HEMORRHOID SURGERY  40 yrs ago    HYSTERECTOMY      KNEE ARTHROSCOPY  1/19/12    left     LUMBAR DRAIN IMPLANTATION  6/27/2012    OTHER SURGICAL HISTORY  07/23/2012    repair lumbar meningiocele with lumbar drain    OTHER SURGICAL HISTORY N/A 10/04/2016    sacral nerve stimulation    TONSILLECTOMY      UPPER GASTROINTESTINAL ENDOSCOPY N/A 9/20/2019    EGD ESOPHAGOGASTRODUODENOSCOPY performed by Shagufta Delong DO at [address]  2008    left       Social History:    Social History     Tobacco Use    Smoking status: Never Smoker    Smokeless tobacco: Never Used   Substance Use Topics    Alcohol use: Yes     Alcohol/week: 1.0 standard drinks     Types: 1 Glasses of wine per week     Comment: daily                                Counseling given: Not Answered      Vital Signs (Current): There were no vitals filed for this visit.                                            BP Readings from Last 3 Encounters:   08/14/20 (!) 145/67   07/30/20 107/62   07/30/20 (!) 103/57       NPO Status:                                                                                 BMI:   Wt Readings from Last 3 Encounters:   07/30/20 200 lb (90.7 kg)   11/26/19 200 lb (90.7 kg)   09/19/19 216 lb (98 kg)     There is no height or weight on file to calculate BMI.    CBC:   Lab Results   Component Value Date    WBC 6.9 09/20/2019    RBC 4.61 09/20/2019    HGB 13.1 09/20/2019    HCT 39.4 09/20/2019    MCV 85.5 09/20/2019    RDW 14.9 09/20/2019     09/20/2019       CMP:   Lab Results   Component Value Date     09/20/2019    K 4.0 09/20/2019     09/20/2019    CO2 23 09/20/2019    BUN 21 09/20/2019    CREATININE 1.3 09/20/2019    GFRAA 47 09/20/2019    LABGLOM 39 09/20/2019    GLUCOSE 113 09/20/2019    GLUCOSE 105 05/24/2012    PROT 6.5 09/20/2019    CALCIUM 9.3 08/14/2020    BILITOT 0.5 09/20/2019    ALKPHOS 49 09/20/2019    AST 15 09/20/2019    ALT 12 09/20/2019       POC Tests: No results for input(s): POCGLU, POCNA, POCK, POCCL, POCBUN, POCHEMO, POCHCT in the last 72 hours. Coags:   Lab Results   Component Value Date    PROTIME 12.3 06/13/2014    PROTIME 21.9 05/18/2012    INR 1.1 06/13/2014    APTT 27.2 06/13/2014       HCG (If Applicable): No results found for: PREGTESTUR, PREGSERUM, HCG, HCGQUANT     ABGs:   Lab Results   Component Value Date    Q6EBNRUJ 98.1 07/29/2012        Type & Screen (If Applicable):  No results found for: LABABO, [address]    Anesthesia Evaluation  Patient summary reviewed   history of anesthetic complications: difficult airway. Airway: Mallampati: II  TM distance: >3 FB   Neck ROM: full  Mouth opening: > = 3 FB Dental: normal exam   (+) partials      Pulmonary: breath sounds clear to auscultation  (+) pneumonia:      (-) COPD                           Cardiovascular:    (+) hypertension:, hyperlipidemia      ECG reviewed  Rhythm: regular  Rate: normal  Echocardiogram reviewed  Stress test reviewed             ROS comment: EKG: Normal Sinus Rhythm 78. ECHO:  Left ventricular size is grossly normal.   Normal left ventricular wall thickness. Ejection fraction is visually estimated normal   No regional wall motion abnormalities seen. No evidence of left ventricular mass or thrombus noted. Normal sized left atrium. No evidence of thrombus within left atrium. No evidence of mass within left atrium. Interatrial septum appears intact. Physiologic and/or trace mitral regurgitation is present. No mitral valve stenosis present. Physiologic and/or trace tricuspid regurgitation. RVSP is 38 mmHg. Pulmonary hypertension is mild . Regular rhythm. Neuro/Psych:   (+) neuromuscular disease:, headaches:, psychiatric history:            GI/Hepatic/Renal:   (+) PUD,      (-) no renal disease      ROS comment: Diarrhea.    Endo/Other:    (+) hypothyroidism::., .                 Abdominal:   (+) obese,         Vascular:                                          Anesthesia Plan      MAC     ASA 3       Induction: intravenous. Anesthetic plan and risks discussed with patient. Plan discussed with CRNA.                   Brenda Tucker MD   11/25/2020

## 2020-11-25 NOTE — OP NOTE
[address]                                OPERATIVE REPORT    PATIENT NAME: Bandar Hodge                     :        1932  MED REC NO:   [id number]                            ROOM:  ACCOUNT NO:   [de-identified]                           ADMIT DATE: 2020  PROVIDER:     Alton Tomas MD    DATE OF PROCEDURE:  2020    SURGEON:  Alton Tomas MD    PREOPERATIVE DIAGNOSIS:  Diarrhea. POSTOPERATIVE DIAGNOSIS:  Diverticulosis coli. OPERATION PERFORMED:  Colonoscopy. INDICATIONS:  The patient is 80year-old. Persistent diarrhea for a  month with incontinence. ASA classification III. Informed consent. SEDATION:  By Anesthesia. DIGITAL RECTAL EXAM:  The anal sphincter is somewhat atonic. OPERATIVE PROCEDURE:  The Olympus video colonoscope was introduced  through the anus and advanced gently until the terminal ileum. The prep  was good. The mucosa was normal.  Biopsies terminal ileum. Multiple  diverticular openings in the sigmoid with the foreign material of the  lumen. Biopsy of rectum and sigmoid. Exam of the anal area by  retroflexion. Withdrawal time 8 minutes. Estimated blood loss none.         Dionne Newell MD    D: 2020 9:53:37       T: 2020 10:57:14     [email or web address]  Job#: [id number]     Doc#: [id number]    CC:  Cristiana Chase MD

## 2021-02-17 PROBLEM — M81.0 OSTEOPOROSIS: Status: ACTIVE | Noted: 2021-02-17

## 2021-02-18 ENCOUNTER — HOSPITAL ENCOUNTER (OUTPATIENT)
Dept: INFUSION THERAPY | Age: 86
Setting detail: INFUSION SERIES
Discharge: HOME | End: 2021-02-18
Payer: MEDICARE

## 2021-02-18 DIAGNOSIS — M81.0 OSTEOPOROSIS, UNSPECIFIED OSTEOPOROSIS TYPE, UNSPECIFIED PATHOLOGICAL FRACTURE PRESENCE: Primary | ICD-10-CM

## 2021-02-18 LAB — CALCIUM SERPL-MCNC: 9.6 MG/DL (ref 8.6–10.2)

## 2021-02-18 PROCEDURE — 96372 THER/PROPH/DIAG INJ SC/IM: CPT

## 2021-02-18 PROCEDURE — 6360000002 HC RX W HCPCS: Performed by: INTERNAL MEDICINE

## 2021-02-18 PROCEDURE — 82310 ASSAY OF CALCIUM: CPT

## 2021-02-18 PROCEDURE — 36415 COLL VENOUS BLD VENIPUNCTURE: CPT

## 2021-02-18 RX ADMIN — DENOSUMAB 60 MG: 60 INJECTION SUBCUTANEOUS at 10:19

## 2021-04-05 ENCOUNTER — HOSPITAL ENCOUNTER (OUTPATIENT)
Dept: MAMMOGRAPHY | Age: 86
Discharge: HOME | End: 2021-04-07
Payer: MEDICARE

## 2021-04-05 DIAGNOSIS — Z12.31 ENCOUNTER FOR SCREENING MAMMOGRAM FOR MALIGNANT NEOPLASM OF BREAST: ICD-10-CM

## 2021-04-05 PROCEDURE — 77067 SCR MAMMO BI INCL CAD: CPT

## 2021-05-11 VITALS
HEIGHT: 64 IN | OXYGEN SATURATION: 94 % | BODY MASS INDEX: 35.94 KG/M2 | WEIGHT: 210.5 LBS | TEMPERATURE: 97.3 F | HEART RATE: 78 BPM | RESPIRATION RATE: 16 BRPM | DIASTOLIC BLOOD PRESSURE: 80 MMHG | SYSTOLIC BLOOD PRESSURE: 140 MMHG

## 2021-06-07 ENCOUNTER — OFFICE VISIT (OUTPATIENT)
Dept: PRIMARY CARE CLINIC | Age: 86
End: 2021-06-07
Payer: MEDICARE

## 2021-06-07 VITALS
OXYGEN SATURATION: 96 % | BODY MASS INDEX: 35.68 KG/M2 | HEART RATE: 75 BPM | DIASTOLIC BLOOD PRESSURE: 56 MMHG | HEIGHT: 64 IN | WEIGHT: 209 LBS | SYSTOLIC BLOOD PRESSURE: 144 MMHG | TEMPERATURE: 97.5 F

## 2021-06-07 DIAGNOSIS — E03.9 ACQUIRED HYPOTHYROIDISM: ICD-10-CM

## 2021-06-07 DIAGNOSIS — I10 ESSENTIAL HYPERTENSION: ICD-10-CM

## 2021-06-07 DIAGNOSIS — E78.2 MIXED HYPERLIPIDEMIA: ICD-10-CM

## 2021-06-07 DIAGNOSIS — B37.2 CANDIDIASIS OF SKIN: Primary | ICD-10-CM

## 2021-06-07 PROCEDURE — 99213 OFFICE O/P EST LOW 20 MIN: CPT | Performed by: INTERNAL MEDICINE

## 2021-06-07 RX ORDER — LEVOTHYROXINE SODIUM 0.05 MG/1
50 TABLET ORAL DAILY
Qty: 90 TABLET | Refills: 0 | Status: SHIPPED
Start: 2021-06-07 | End: 2021-09-02 | Stop reason: SDUPTHER

## 2021-06-07 RX ORDER — CLOTRIMAZOLE AND BETAMETHASONE DIPROPIONATE 10; .64 MG/G; MG/G
CREAM TOPICAL
Qty: 45 G | Refills: 1 | Status: SHIPPED | OUTPATIENT
Start: 2021-06-07

## 2021-06-07 RX ORDER — SIMVASTATIN 20 MG
20 TABLET ORAL DAILY
Qty: 90 TABLET | Refills: 0 | Status: SHIPPED
Start: 2021-06-07 | End: 2021-09-02 | Stop reason: SDUPTHER

## 2021-06-07 RX ORDER — AMLODIPINE BESYLATE AND BENAZEPRIL HYDROCHLORIDE 5; 10 MG/1; MG/1
1 CAPSULE ORAL 2 TIMES DAILY
Qty: 90 CAPSULE | Refills: 0 | Status: SHIPPED
Start: 2021-06-07 | End: 2021-07-28 | Stop reason: SDUPTHER

## 2021-06-07 RX ORDER — PANTOPRAZOLE SODIUM 40 MG/1
40 TABLET, DELAYED RELEASE ORAL DAILY
Qty: 90 TABLET | Refills: 0 | Status: SHIPPED
Start: 2021-06-07 | End: 2021-09-02 | Stop reason: SDUPTHER

## 2021-06-07 ASSESSMENT — PATIENT HEALTH QUESTIONNAIRE - PHQ9
2. FEELING DOWN, DEPRESSED OR HOPELESS: 0
SUM OF ALL RESPONSES TO PHQ QUESTIONS 1-9: 0
SUM OF ALL RESPONSES TO PHQ9 QUESTIONS 1 & 2: 0
SUM OF ALL RESPONSES TO PHQ QUESTIONS 1-9: 0
SUM OF ALL RESPONSES TO PHQ QUESTIONS 1-9: 0
1. LITTLE INTEREST OR PLEASURE IN DOING THINGS: 0

## 2021-06-07 NOTE — PATIENT INSTRUCTIONS
Patient was advised to apply Lotrisone topically twice a day  Continue taking all prescribed medications  Follow-up in 3 weeks

## 2021-06-07 NOTE — PROGRESS NOTES
foot 2 inches in diameter scaly on the border  Erythematous areas in the right mid tibia laterally.   Also similar lesions in the dorsum of the right hand    Hemoglobin A1C   Date Value Ref Range Status   11/03/2010 5.7 4.8 - 6.0 % Final     Cholesterol, Total   Date Value Ref Range Status   12/29/2017 146 0 - 199 mg/dL Final     Triglycerides   Date Value Ref Range Status   12/29/2017 112 0 - 149 mg/dL Final     HDL   Date Value Ref Range Status   12/29/2017 49 >40 mg/dL Final     LDL Calculated   Date Value Ref Range Status   12/29/2017 75 0 - 99 mg/dL Final     VLDL Cholesterol Calculated   Date Value Ref Range Status   12/29/2017 22 mg/dL Final     Sodium   Date Value Ref Range Status   09/20/2019 140 132 - 146 mmol/L Final     Potassium   Date Value Ref Range Status   09/20/2019 4.0 3.5 - 5.0 mmol/L Final     Chloride   Date Value Ref Range Status   09/20/2019 105 98 - 107 mmol/L Final     CO2   Date Value Ref Range Status   09/20/2019 23 22 - 29 mmol/L Final     BUN   Date Value Ref Range Status   09/20/2019 21 8 - 23 mg/dL Final     CREATININE   Date Value Ref Range Status   09/20/2019 1.3 (H) 0.5 - 1.0 mg/dL Final     Glucose   Date Value Ref Range Status   09/20/2019 113 (H) 74 - 99 mg/dL Final     Calcium   Date Value Ref Range Status   02/18/2021 9.6 8.6 - 10.2 mg/dL Final     Total Protein   Date Value Ref Range Status   09/20/2019 6.5 6.4 - 8.3 g/dL Final     Albumin   Date Value Ref Range Status   09/20/2019 4.0 3.5 - 5.2 g/dL Final     Total Bilirubin   Date Value Ref Range Status   09/20/2019 0.5 0.0 - 1.2 mg/dL Final     Alkaline Phosphatase   Date Value Ref Range Status   09/20/2019 49 35 - 104 U/L Final     AST   Date Value Ref Range Status   09/20/2019 15 0 - 31 U/L Final     ALT   Date Value Ref Range Status   09/20/2019 12 0 - 32 U/L Final     GFR Non-   Date Value Ref Range Status   09/20/2019 39 >=60 mL/min/1.73 Final     Comment:     Chronic Kidney Disease: less than 60 ml/min/1.73 sq.m. Kidney Failure: less than 15 ml/min/1.73 sq.m. Results valid for patients 18 years and older. GFR    Date Value Ref Range Status   09/20/2019 47  Final        No results found. Assessment/Plan:      Outpatient Encounter Medications as of 6/7/2021   Medication Sig Dispense Refill    amLODIPine-benazepril (LOTREL) 5-10 MG per capsule Take 1 capsule by mouth 2 times daily 90 capsule 0    levothyroxine (SYNTHROID) 50 MCG tablet Take 1 tablet by mouth Daily 90 tablet 0    pantoprazole (PROTONIX) 40 MG tablet Take 1 tablet by mouth daily 90 tablet 0    simvastatin (ZOCOR) 20 MG tablet Take 1 tablet by mouth daily 90 tablet 0    clotrimazole-betamethasone (LOTRISONE) 1-0.05 % cream Apply topically 2 times daily. 45 g 1    ibuprofen (ADVIL;MOTRIN) 800 MG tablet Take 1 tablet by mouth every 8 hours as needed for Pain 25 tablet 0    VITAMIN E PO Take 1 capsule by mouth daily      aspirin 81 MG EC tablet Take 81 mg by mouth daily Last dose  1 week ago      [DISCONTINUED] amLODIPine-benazepril (LOTREL) 5-10 MG per capsule Take 1 capsule by mouth 2 times daily      [DISCONTINUED] levothyroxine (SYNTHROID) 50 MCG tablet Take 50 mcg by mouth Daily      [DISCONTINUED] pantoprazole (PROTONIX) 40 MG tablet Take 40 mg by mouth daily      [DISCONTINUED] simvastatin (ZOCOR) 20 MG tablet Take 20 mg by mouth daily       No facility-administered encounter medications on file as of 6/7/2021. General Gables was seen today for other. Diagnoses and all orders for this visit:    Candidiasis of skin  -     clotrimazole-betamethasone (LOTRISONE) 1-0.05 % cream; Apply topically 2 times daily. Acquired hypothyroidism  -     levothyroxine (SYNTHROID) 50 MCG tablet; Take 1 tablet by mouth Daily    Essential hypertension  -     amLODIPine-benazepril (LOTREL) 5-10 MG per capsule;  Take 1 capsule by mouth 2 times daily    Mixed hyperlipidemia  -     simvastatin (ZOCOR) 20 MG tablet; Take 1 tablet by mouth daily    Other orders  -     pantoprazole (PROTONIX) 40 MG tablet;  Take 1 tablet by mouth daily         Patient Instructions   Patient was advised to apply Lotrisone topically twice a day  Continue taking all prescribed medications  Follow-up in 3 weeks             Susana Cotton MD   6/7/21

## 2021-06-24 ENCOUNTER — OFFICE VISIT (OUTPATIENT)
Dept: PRIMARY CARE CLINIC | Age: 86
End: 2021-06-24
Payer: MEDICARE

## 2021-06-24 VITALS
TEMPERATURE: 97.2 F | OXYGEN SATURATION: 96 % | BODY MASS INDEX: 35.67 KG/M2 | WEIGHT: 208.9 LBS | HEART RATE: 65 BPM | DIASTOLIC BLOOD PRESSURE: 50 MMHG | HEIGHT: 64 IN | SYSTOLIC BLOOD PRESSURE: 140 MMHG

## 2021-06-24 DIAGNOSIS — I10 ESSENTIAL HYPERTENSION: ICD-10-CM

## 2021-06-24 DIAGNOSIS — R21 ACUTE SKIN ERUPTION OF DISCOLORATION, ELEVATIONS, BLISTERS: Primary | ICD-10-CM

## 2021-06-24 DIAGNOSIS — E03.9 ACQUIRED HYPOTHYROIDISM: ICD-10-CM

## 2021-06-24 PROCEDURE — 99213 OFFICE O/P EST LOW 20 MIN: CPT | Performed by: INTERNAL MEDICINE

## 2021-06-24 RX ORDER — METHYLPREDNISOLONE 4 MG/1
TABLET ORAL
Qty: 1 KIT | Refills: 0 | Status: SHIPPED | OUTPATIENT
Start: 2021-06-24 | End: 2021-06-30

## 2021-06-24 RX ORDER — MICONAZOLE NITRATE 20.6 MG/G
POWDER TOPICAL
Qty: 100 G | Refills: 0 | Status: SHIPPED | OUTPATIENT
Start: 2021-06-24

## 2021-06-24 NOTE — PROGRESS NOTES
Erythematous areas on the abdomen right groin left side of the face some are macular spots and others are diffuse areas nontender   + blisters    Hemoglobin A1C   Date Value Ref Range Status   11/03/2010 5.7 4.8 - 6.0 % Final     Cholesterol, Total   Date Value Ref Range Status   12/29/2017 146 0 - 199 mg/dL Final     Triglycerides   Date Value Ref Range Status   12/29/2017 112 0 - 149 mg/dL Final     HDL   Date Value Ref Range Status   12/29/2017 49 >40 mg/dL Final     LDL Calculated   Date Value Ref Range Status   12/29/2017 75 0 - 99 mg/dL Final     VLDL Cholesterol Calculated   Date Value Ref Range Status   12/29/2017 22 mg/dL Final     Sodium   Date Value Ref Range Status   09/20/2019 140 132 - 146 mmol/L Final     Potassium   Date Value Ref Range Status   09/20/2019 4.0 3.5 - 5.0 mmol/L Final     Chloride   Date Value Ref Range Status   09/20/2019 105 98 - 107 mmol/L Final     CO2   Date Value Ref Range Status   09/20/2019 23 22 - 29 mmol/L Final     BUN   Date Value Ref Range Status   09/20/2019 21 8 - 23 mg/dL Final     CREATININE   Date Value Ref Range Status   09/20/2019 1.3 (H) 0.5 - 1.0 mg/dL Final     Glucose   Date Value Ref Range Status   09/20/2019 113 (H) 74 - 99 mg/dL Final     Calcium   Date Value Ref Range Status   02/18/2021 9.6 8.6 - 10.2 mg/dL Final     Total Protein   Date Value Ref Range Status   09/20/2019 6.5 6.4 - 8.3 g/dL Final     Albumin   Date Value Ref Range Status   09/20/2019 4.0 3.5 - 5.2 g/dL Final     Total Bilirubin   Date Value Ref Range Status   09/20/2019 0.5 0.0 - 1.2 mg/dL Final     Alkaline Phosphatase   Date Value Ref Range Status   09/20/2019 49 35 - 104 U/L Final     AST   Date Value Ref Range Status   09/20/2019 15 0 - 31 U/L Final     ALT   Date Value Ref Range Status   09/20/2019 12 0 - 32 U/L Final     GFR Non-   Date Value Ref Range Status   09/20/2019 39 >=60 mL/min/1.73 Final     Comment:     Chronic Kidney Disease: less than 60 ml/min/1.73 sq.m.          Kidney Failure: less than 15 ml/min/1.73 sq.m. Results valid for patients 18 years and older. GFR    Date Value Ref Range Status   09/20/2019 47  Final        No results found. Assessment/Plan:      Outpatient Encounter Medications as of 6/24/2021   Medication Sig Dispense Refill    methylPREDNISolone (MEDROL DOSEPACK) 4 MG tablet Take by mouth. 1 kit 0    miconazole (ZEASORB-AF) 2 % powder Apply topically 2 times daily. 100 g 0    amLODIPine-benazepril (LOTREL) 5-10 MG per capsule Take 1 capsule by mouth 2 times daily 90 capsule 0    levothyroxine (SYNTHROID) 50 MCG tablet Take 1 tablet by mouth Daily 90 tablet 0    pantoprazole (PROTONIX) 40 MG tablet Take 1 tablet by mouth daily 90 tablet 0    simvastatin (ZOCOR) 20 MG tablet Take 1 tablet by mouth daily 90 tablet 0    clotrimazole-betamethasone (LOTRISONE) 1-0.05 % cream Apply topically 2 times daily. 45 g 1    VITAMIN E PO Take 1 capsule by mouth daily      aspirin 81 MG EC tablet Take 81 mg by mouth daily Last dose  1 week ago      ibuprofen (ADVIL;MOTRIN) 800 MG tablet Take 1 tablet by mouth every 8 hours as needed for Pain 25 tablet 0     No facility-administered encounter medications on file as of 6/24/2021. Lennox [place] was seen today for other. Diagnoses and all orders for this visit:    Acute skin eruption of discoloration, elevations, blisters  -     methylPREDNISolone (MEDROL DOSEPACK) 4 MG tablet; Take by mouth.  -     [facility]  -     SEDIMENTATION RATE; Future  -     COMPREHENSIVE METABOLIC PANEL; Future  -     HOLLI; Future  -     C-REACTIVE PROTEIN; Future    Essential hypertension  -     COMPREHENSIVE METABOLIC PANEL; Future  -     CBC WITH AUTO DIFFERENTIAL; Future    Acquired hypothyroidism  -     TSH; Future    Other orders  -     miconazole (ZEASORB-AF) 2 % powder; Apply topically 2 times daily.          There are no Patient Instructions on file for this visit.           Tere Moreno MD   6/24/21

## 2021-06-25 DIAGNOSIS — I10 ESSENTIAL HYPERTENSION: ICD-10-CM

## 2021-06-25 DIAGNOSIS — R21 ACUTE SKIN ERUPTION OF DISCOLORATION, ELEVATIONS, BLISTERS: ICD-10-CM

## 2021-06-25 DIAGNOSIS — E03.9 ACQUIRED HYPOTHYROIDISM: ICD-10-CM

## 2021-06-25 LAB
ALBUMIN SERPL-MCNC: 4.5 G/DL (ref 3.5–5.2)
ALP BLD-CCNC: 54 U/L (ref 35–104)
ALT SERPL-CCNC: 17 U/L (ref 0–32)
ANION GAP SERPL CALCULATED.3IONS-SCNC: 11 MMOL/L (ref 7–16)
AST SERPL-CCNC: 16 U/L (ref 0–31)
BASOPHILS ABSOLUTE: 0.03 E9/L (ref 0–0.2)
BASOPHILS RELATIVE PERCENT: 0.2 % (ref 0–2)
BILIRUB SERPL-MCNC: 0.4 MG/DL (ref 0–1.2)
BUN BLDV-MCNC: 34 MG/DL (ref 6–23)
C-REACTIVE PROTEIN: 0.3 MG/DL (ref 0–0.4)
CALCIUM SERPL-MCNC: 9.5 MG/DL (ref 8.6–10.2)
CHLORIDE BLD-SCNC: 105 MMOL/L (ref 98–107)
CO2: 24 MMOL/L (ref 22–29)
CREAT SERPL-MCNC: 1.6 MG/DL (ref 0.5–1)
EOSINOPHILS ABSOLUTE: 0.01 E9/L (ref 0.05–0.5)
EOSINOPHILS RELATIVE PERCENT: 0.1 % (ref 0–6)
GFR AFRICAN AMERICAN: 37
GFR NON-AFRICAN AMERICAN: 30 ML/MIN/1.73
GLUCOSE BLD-MCNC: 112 MG/DL (ref 74–99)
HCT VFR BLD CALC: 43.7 % (ref 34–48)
HEMOGLOBIN: 14.2 G/DL (ref 11.5–15.5)
IMMATURE GRANULOCYTES #: 0.13 E9/L
IMMATURE GRANULOCYTES %: 1 % (ref 0–5)
LYMPHOCYTES ABSOLUTE: 1.15 E9/L (ref 1.5–4)
LYMPHOCYTES RELATIVE PERCENT: 9.2 % (ref 20–42)
MCH RBC QN AUTO: 28.6 PG (ref 26–35)
MCHC RBC AUTO-ENTMCNC: 32.5 % (ref 32–34.5)
MCV RBC AUTO: 88.1 FL (ref 80–99.9)
MONOCYTES ABSOLUTE: 1.16 E9/L (ref 0.1–0.95)
MONOCYTES RELATIVE PERCENT: 9.3 % (ref 2–12)
NEUTROPHILS ABSOLUTE: 9.96 E9/L (ref 1.8–7.3)
NEUTROPHILS RELATIVE PERCENT: 80.2 % (ref 43–80)
PDW BLD-RTO: 15.3 FL (ref 11.5–15)
PLATELET # BLD: 236 E9/L (ref 130–450)
PMV BLD AUTO: 11.4 FL (ref 7–12)
POTASSIUM SERPL-SCNC: 4.7 MMOL/L (ref 3.5–5)
RBC # BLD: 4.96 E12/L (ref 3.5–5.5)
SEDIMENTATION RATE, ERYTHROCYTE: 9 MM/HR (ref 0–20)
SODIUM BLD-SCNC: 140 MMOL/L (ref 132–146)
TOTAL PROTEIN: 7.4 G/DL (ref 6.4–8.3)
TSH SERPL DL<=0.05 MIU/L-ACNC: 0.67 UIU/ML (ref 0.27–4.2)
WBC # BLD: 12.4 E9/L (ref 4.5–11.5)

## 2021-06-28 LAB — ANTI-NUCLEAR ANTIBODY (ANA): NEGATIVE

## 2021-07-08 ENCOUNTER — HOSPITAL ENCOUNTER (OUTPATIENT)
Age: 86
Discharge: HOME | End: 2021-07-10
Payer: MEDICARE

## 2021-07-08 ENCOUNTER — HOSPITAL ENCOUNTER (OUTPATIENT)
Dept: GENERAL RADIOLOGY | Age: 86
Discharge: HOME | End: 2021-07-10
Payer: MEDICARE

## 2021-07-08 ENCOUNTER — OFFICE VISIT (OUTPATIENT)
Dept: PRIMARY CARE CLINIC | Age: 86
End: 2021-07-08
Payer: MEDICARE

## 2021-07-08 VITALS
TEMPERATURE: 97.3 F | WEIGHT: 208.9 LBS | OXYGEN SATURATION: 98 % | BODY MASS INDEX: 35.67 KG/M2 | DIASTOLIC BLOOD PRESSURE: 62 MMHG | HEIGHT: 64 IN | SYSTOLIC BLOOD PRESSURE: 130 MMHG | HEART RATE: 68 BPM

## 2021-07-08 DIAGNOSIS — I10 ESSENTIAL HYPERTENSION: ICD-10-CM

## 2021-07-08 DIAGNOSIS — J20.9 ACUTE BRONCHITIS, UNSPECIFIED ORGANISM: ICD-10-CM

## 2021-07-08 DIAGNOSIS — J20.9 ACUTE BRONCHITIS, UNSPECIFIED ORGANISM: Primary | ICD-10-CM

## 2021-07-08 PROCEDURE — 99214 OFFICE O/P EST MOD 30 MIN: CPT | Performed by: INTERNAL MEDICINE

## 2021-07-08 PROCEDURE — 71046 X-RAY EXAM CHEST 2 VIEWS: CPT

## 2021-07-08 RX ORDER — AZITHROMYCIN 250 MG/1
250 TABLET, FILM COATED ORAL SEE ADMIN INSTRUCTIONS
Qty: 6 TABLET | Refills: 0 | Status: SHIPPED | OUTPATIENT
Start: 2021-07-08 | End: 2021-07-13

## 2021-07-08 RX ORDER — GUAIFENESIN 600 MG/1
600 TABLET, EXTENDED RELEASE ORAL 2 TIMES DAILY
Qty: 30 TABLET | Refills: 0 | Status: SHIPPED | OUTPATIENT
Start: 2021-07-08 | End: 2021-07-23

## 2021-07-08 RX ORDER — ALBUTEROL SULFATE 90 UG/1
2 AEROSOL, METERED RESPIRATORY (INHALATION) 4 TIMES DAILY PRN
Qty: 3 INHALER | Refills: 1 | Status: SHIPPED
Start: 2021-07-08 | End: 2022-03-02 | Stop reason: SDUPTHER

## 2021-07-08 NOTE — PROGRESS NOTES
clubbing, cyanosis or edema  Skin: unremarkable    Hemoglobin A1C   Date Value Ref Range Status   11/03/2010 5.7 4.8 - 6.0 % Final     Cholesterol, Total   Date Value Ref Range Status   12/29/2017 146 0 - 199 mg/dL Final     Triglycerides   Date Value Ref Range Status   12/29/2017 112 0 - 149 mg/dL Final     HDL   Date Value Ref Range Status   12/29/2017 49 >40 mg/dL Final     LDL Calculated   Date Value Ref Range Status   12/29/2017 75 0 - 99 mg/dL Final     VLDL Cholesterol Calculated   Date Value Ref Range Status   12/29/2017 22 mg/dL Final     Sodium   Date Value Ref Range Status   06/25/2021 140 132 - 146 mmol/L Final     Potassium   Date Value Ref Range Status   06/25/2021 4.7 3.5 - 5.0 mmol/L Final     Chloride   Date Value Ref Range Status   06/25/2021 105 98 - 107 mmol/L Final     CO2   Date Value Ref Range Status   06/25/2021 24 22 - 29 mmol/L Final     BUN   Date Value Ref Range Status   06/25/2021 34 (H) 6 - 23 mg/dL Final     CREATININE   Date Value Ref Range Status   06/25/2021 1.6 (H) 0.5 - 1.0 mg/dL Final     Glucose   Date Value Ref Range Status   06/25/2021 112 (H) 74 - 99 mg/dL Final     Calcium   Date Value Ref Range Status   06/25/2021 9.5 8.6 - 10.2 mg/dL Final     Total Protein   Date Value Ref Range Status   06/25/2021 7.4 6.4 - 8.3 g/dL Final     Albumin   Date Value Ref Range Status   06/25/2021 4.5 3.5 - 5.2 g/dL Final     Total Bilirubin   Date Value Ref Range Status   06/25/2021 0.4 0.0 - 1.2 mg/dL Final     Alkaline Phosphatase   Date Value Ref Range Status   06/25/2021 54 35 - 104 U/L Final     AST   Date Value Ref Range Status   06/25/2021 16 0 - 31 U/L Final     ALT   Date Value Ref Range Status   06/25/2021 17 0 - 32 U/L Final     GFR Non-   Date Value Ref Range Status   06/25/2021 30 >=60 mL/min/1.73 Final     Comment:     Chronic Kidney Disease: less than 60 ml/min/1.73 sq.m. Kidney Failure: less than 15 ml/min/1.73 sq.m.   Results valid for patients 18 years and older. GFR    Date Value Ref Range Status   06/25/2021 37  Final        No results found. Assessment/Plan:      Outpatient Encounter Medications as of 7/8/2021   Medication Sig Dispense Refill    guaiFENesin (MUCINEX) 600 MG extended release tablet Take 1 tablet by mouth 2 times daily for 15 days 30 tablet 0    albuterol sulfate HFA (VENTOLIN HFA) 108 (90 Base) MCG/ACT inhaler Inhale 2 puffs into the lungs 4 times daily as needed for Wheezing 3 Inhaler 1    azithromycin (ZITHROMAX) 250 MG tablet Take 1 tablet by mouth See Admin Instructions for 5 days 500mg on day 1 followed by 250mg on days 2 - 5 6 tablet 0    miconazole (ZEASORB-AF) 2 % powder Apply topically 2 times daily. 100 g 0    amLODIPine-benazepril (LOTREL) 5-10 MG per capsule Take 1 capsule by mouth 2 times daily 90 capsule 0    levothyroxine (SYNTHROID) 50 MCG tablet Take 1 tablet by mouth Daily 90 tablet 0    pantoprazole (PROTONIX) 40 MG tablet Take 1 tablet by mouth daily 90 tablet 0    simvastatin (ZOCOR) 20 MG tablet Take 1 tablet by mouth daily 90 tablet 0    ibuprofen (ADVIL;MOTRIN) 800 MG tablet Take 1 tablet by mouth every 8 hours as needed for Pain 25 tablet 0    VITAMIN E PO Take 1 capsule by mouth daily      aspirin 81 MG EC tablet Take 81 mg by mouth daily Last dose  1 week ago      clotrimazole-betamethasone (LOTRISONE) 1-0.05 % cream Apply topically 2 times daily. (Patient not taking: Reported on 7/8/2021) 45 g 1     No facility-administered encounter medications on file as of 7/8/2021. Melvin Warner was seen today for other. Diagnoses and all orders for this visit:    Acute bronchitis, unspecified organism  -     XR CHEST (2 VW); Future  -     guaiFENesin (MUCINEX) 600 MG extended release tablet; Take 1 tablet by mouth 2 times daily for 15 days  -     albuterol sulfate HFA (VENTOLIN HFA) 108 (90 Base) MCG/ACT inhaler;  Inhale 2 puffs into the lungs 4 times daily as needed for Wheezing  - azithromycin (ZITHROMAX) 250 MG tablet;  Take 1 tablet by mouth See Admin Instructions for 5 days 500mg on day 1 followed by 250mg on days 2 - 5    Essential hypertension         Patient Instructions   Patient was advised to take Mucinex tablet twice a day  Take albuterol inhaler 2 puffs 4 times a day  Start Z-Fortunato as directed  Get a chest x-ray done  Follow-up in 10 days in the office             Michael Walker MD   7/8/21

## 2021-07-08 NOTE — PATIENT INSTRUCTIONS
Patient was advised to take Mucinex tablet twice a day  Take albuterol inhaler 2 puffs 4 times a day  Start Z-Fortunato as directed  Get a chest x-ray done  Follow-up in 10 days in the office

## 2021-07-19 ENCOUNTER — OFFICE VISIT (OUTPATIENT)
Dept: PRIMARY CARE CLINIC | Age: 86
End: 2021-07-19
Payer: MEDICARE

## 2021-07-19 VITALS
WEIGHT: 210.3 LBS | SYSTOLIC BLOOD PRESSURE: 130 MMHG | BODY MASS INDEX: 35.9 KG/M2 | DIASTOLIC BLOOD PRESSURE: 68 MMHG | OXYGEN SATURATION: 95 % | HEART RATE: 82 BPM | HEIGHT: 64 IN | TEMPERATURE: 97.3 F

## 2021-07-19 DIAGNOSIS — E03.9 ACQUIRED HYPOTHYROIDISM: ICD-10-CM

## 2021-07-19 DIAGNOSIS — I10 ESSENTIAL HYPERTENSION: ICD-10-CM

## 2021-07-19 DIAGNOSIS — J20.8 ACUTE BRONCHITIS DUE TO OTHER SPECIFIED ORGANISMS: Primary | ICD-10-CM

## 2021-07-19 PROCEDURE — 99213 OFFICE O/P EST LOW 20 MIN: CPT | Performed by: INTERNAL MEDICINE

## 2021-07-19 SDOH — ECONOMIC STABILITY: FOOD INSECURITY: WITHIN THE PAST 12 MONTHS, THE FOOD YOU BOUGHT JUST DIDN'T LAST AND YOU DIDN'T HAVE MONEY TO GET MORE.: NEVER TRUE

## 2021-07-19 SDOH — ECONOMIC STABILITY: FOOD INSECURITY: WITHIN THE PAST 12 MONTHS, YOU WORRIED THAT YOUR FOOD WOULD RUN OUT BEFORE YOU GOT MONEY TO BUY MORE.: NEVER TRUE

## 2021-07-19 ASSESSMENT — SOCIAL DETERMINANTS OF HEALTH (SDOH): HOW HARD IS IT FOR YOU TO PAY FOR THE VERY BASICS LIKE FOOD, HOUSING, MEDICAL CARE, AND HEATING?: NOT HARD AT ALL

## 2021-07-19 NOTE — PROGRESS NOTES
Chief Complaint   Patient presents with    Congestion     Pt is here as a 10 day F/U, with no recent exposure to Covid. Pt states she is feeling a little better but productive cough continues, causing an emesis last night. HPI:  Patient is here for follow-up . Feels much better still has some cough not near as much no chest congestion no fever good appetite hoarseness of voice completely resolved. Chest x-ray showed no acute process    Past Medical History, Surgical History, and Family History has been reviewed and updated.     Review of Systems:  Constitutional:  No fever, no fatigue, no chills, no headaches, no weight change  Dermatology:  No rash, no mole, no dry or sensitive skin  ENT:  No cough, no sore throat, no sinus pain, no runny nose, no ear pain  Cardiology:  No chest pain, no palpitations, no leg edema, no shortness of breath, no PND  Gastroenterology:  No dysphagia, no abdominal pain, no nausea, no vomiting, no constipation, no diarrhea, no heartburn  Musculoskeletal:  No joint pain, no leg cramps, no back pain, no muscle aches  Respiratory:  No shortness of breath, no orthopnea, no wheezing, no ASHLEY, no hemoptysis  Urology:  No blood in the urine, no urinary frequency, no urinary incontinence, no urinary urgency, no nocturia, no dysuria    Vitals:    07/19/21 1358   BP: 130/68   Pulse: 82   Temp: 97.3 °F (36.3 °C)   TempSrc: Temporal   SpO2: 95%   Weight: 210 lb 4.8 oz (95.4 kg)   Height: 5' 3.5\" (1.613 m)       General:  Patient alert and oriented x 3, NAD, pleasant  HEENT:  Atraumatic, normocephalic, PERRLA, EOMI, clear conjunctiva, TMs clear, nose-clear, throat - no erythema  Neck:  Supple, no goiter, no carotid bruits, no LAD  Lungs:  CTA   Heart:  RRR, no murmurs, gallops or rubs  Abdomen:  Soft/nt/nd, + bowel sounds  Extremities:  No clubbing, cyanosis or edema  Skin: unremarkable    Hemoglobin A1C   Date Value Ref Range Status   11/03/2010 5.7 4.8 - 6.0 % Final     Cholesterol, Total Date Value Ref Range Status   12/29/2017 146 0 - 199 mg/dL Final     Triglycerides   Date Value Ref Range Status   12/29/2017 112 0 - 149 mg/dL Final     HDL   Date Value Ref Range Status   12/29/2017 49 >40 mg/dL Final     LDL Calculated   Date Value Ref Range Status   12/29/2017 75 0 - 99 mg/dL Final     VLDL Cholesterol Calculated   Date Value Ref Range Status   12/29/2017 22 mg/dL Final     Sodium   Date Value Ref Range Status   06/25/2021 140 132 - 146 mmol/L Final     Potassium   Date Value Ref Range Status   06/25/2021 4.7 3.5 - 5.0 mmol/L Final     Chloride   Date Value Ref Range Status   06/25/2021 105 98 - 107 mmol/L Final     CO2   Date Value Ref Range Status   06/25/2021 24 22 - 29 mmol/L Final     BUN   Date Value Ref Range Status   06/25/2021 34 (H) 6 - 23 mg/dL Final     CREATININE   Date Value Ref Range Status   06/25/2021 1.6 (H) 0.5 - 1.0 mg/dL Final     Glucose   Date Value Ref Range Status   06/25/2021 112 (H) 74 - 99 mg/dL Final     Calcium   Date Value Ref Range Status   06/25/2021 9.5 8.6 - 10.2 mg/dL Final     Total Protein   Date Value Ref Range Status   06/25/2021 7.4 6.4 - 8.3 g/dL Final     Albumin   Date Value Ref Range Status   06/25/2021 4.5 3.5 - 5.2 g/dL Final     Total Bilirubin   Date Value Ref Range Status   06/25/2021 0.4 0.0 - 1.2 mg/dL Final     Alkaline Phosphatase   Date Value Ref Range Status   06/25/2021 54 35 - 104 U/L Final     AST   Date Value Ref Range Status   06/25/2021 16 0 - 31 U/L Final     ALT   Date Value Ref Range Status   06/25/2021 17 0 - 32 U/L Final     GFR Non-   Date Value Ref Range Status   06/25/2021 30 >=60 mL/min/1.73 Final     Comment:     Chronic Kidney Disease: less than 60 ml/min/1.73 sq.m. Kidney Failure: less than 15 ml/min/1.73 sq.m. Results valid for patients 18 years and older.        GFR    Date Value Ref Range Status   06/25/2021 37  Final        XR CHEST (2 VW)    Result Date: 7/8/2021  EXAMINATION: TWO XRAY VIEWS OF THE CHEST 7/8/2021 2:36 pm COMPARISON: None. HISTORY: ORDERING SYSTEM PROVIDED HISTORY: Acute bronchitis, unspecified organism FINDINGS: The lungs are without acute focal process. There is no effusion or pneumothorax. The cardiomediastinal silhouette is without acute process. The osseous structures are without acute process. There is kyphosis of the thoracic spine. Surgical hardware in the cervical spine. No acute process. Assessment: Acute bronchitis resolved                          Hypertension controlled    Outpatient Encounter Medications as of 7/19/2021   Medication Sig Dispense Refill    guaiFENesin (MUCINEX) 600 MG extended release tablet Take 1 tablet by mouth 2 times daily for 15 days 30 tablet 0    albuterol sulfate HFA (VENTOLIN HFA) 108 (90 Base) MCG/ACT inhaler Inhale 2 puffs into the lungs 4 times daily as needed for Wheezing 3 Inhaler 1    miconazole (ZEASORB-AF) 2 % powder Apply topically 2 times daily. 100 g 0    amLODIPine-benazepril (LOTREL) 5-10 MG per capsule Take 1 capsule by mouth 2 times daily 90 capsule 0    levothyroxine (SYNTHROID) 50 MCG tablet Take 1 tablet by mouth Daily 90 tablet 0    pantoprazole (PROTONIX) 40 MG tablet Take 1 tablet by mouth daily 90 tablet 0    simvastatin (ZOCOR) 20 MG tablet Take 1 tablet by mouth daily 90 tablet 0    ibuprofen (ADVIL;MOTRIN) 800 MG tablet Take 1 tablet by mouth every 8 hours as needed for Pain 25 tablet 0    VITAMIN E PO Take 1 capsule by mouth daily      aspirin 81 MG EC tablet Take 81 mg by mouth daily Last dose  1 week ago      clotrimazole-betamethasone (LOTRISONE) 1-0.05 % cream Apply topically 2 times daily. (Patient not taking: Reported on 7/8/2021) 45 g 1     No facility-administered encounter medications on file as of 7/19/2021. Kay Cohen was seen today for congestion.     Diagnoses and all orders for this visit:    Acute bronchitis due to other specified organisms    Essential hypertension    Acquired hypothyroidism         Patient Instructions   Patient was advised to finish taking Mucinex  Use albuterol inhaler as needed  Continue all other medicines  Follow-up in 3 months             Susana Baker MD   7/19/21

## 2021-07-19 NOTE — PATIENT INSTRUCTIONS
Patient was advised to finish taking Mucinex  Use albuterol inhaler as needed  Continue all other medicines  Follow-up in 3 months

## 2021-07-28 ENCOUNTER — TELEPHONE (OUTPATIENT)
Dept: PRIMARY CARE CLINIC | Age: 86
End: 2021-07-28

## 2021-07-28 DIAGNOSIS — I10 ESSENTIAL HYPERTENSION: ICD-10-CM

## 2021-07-28 DIAGNOSIS — M81.0 OSTEOPOROSIS, UNSPECIFIED OSTEOPOROSIS TYPE, UNSPECIFIED PATHOLOGICAL FRACTURE PRESENCE: ICD-10-CM

## 2021-07-28 DIAGNOSIS — I10 ESSENTIAL HYPERTENSION: Primary | ICD-10-CM

## 2021-07-28 RX ORDER — AMLODIPINE BESYLATE AND BENAZEPRIL HYDROCHLORIDE 5; 10 MG/1; MG/1
1 CAPSULE ORAL 2 TIMES DAILY
Qty: 180 CAPSULE | Refills: 0 | Status: SHIPPED
Start: 2021-07-28 | End: 2021-09-02 | Stop reason: SDUPTHER

## 2021-08-02 RX ORDER — DENOSUMAB 60 MG/ML
60 INJECTION SUBCUTANEOUS ONCE
Qty: 1 ML | Refills: 0 | Status: SHIPPED | OUTPATIENT
Start: 2021-08-02 | End: 2022-02-16

## 2021-09-02 DIAGNOSIS — I10 ESSENTIAL HYPERTENSION: ICD-10-CM

## 2021-09-02 DIAGNOSIS — E03.9 ACQUIRED HYPOTHYROIDISM: ICD-10-CM

## 2021-09-02 DIAGNOSIS — E78.2 MIXED HYPERLIPIDEMIA: ICD-10-CM

## 2021-09-02 RX ORDER — LEVOTHYROXINE SODIUM 0.05 MG/1
50 TABLET ORAL DAILY
Qty: 90 TABLET | Refills: 0 | Status: SHIPPED
Start: 2021-09-02 | End: 2021-10-19 | Stop reason: SDUPTHER

## 2021-09-02 RX ORDER — AMLODIPINE BESYLATE AND BENAZEPRIL HYDROCHLORIDE 5; 10 MG/1; MG/1
1 CAPSULE ORAL 2 TIMES DAILY
Qty: 180 CAPSULE | Refills: 0 | Status: SHIPPED
Start: 2021-09-02 | End: 2021-10-19 | Stop reason: SDUPTHER

## 2021-09-02 RX ORDER — PANTOPRAZOLE SODIUM 40 MG/1
40 TABLET, DELAYED RELEASE ORAL DAILY
Qty: 90 TABLET | Refills: 0 | Status: SHIPPED
Start: 2021-09-02 | End: 2021-10-19 | Stop reason: SDUPTHER

## 2021-09-02 RX ORDER — SIMVASTATIN 20 MG
20 TABLET ORAL DAILY
Qty: 90 TABLET | Refills: 0 | Status: SHIPPED
Start: 2021-09-02 | End: 2021-10-19 | Stop reason: SDUPTHER

## 2021-09-27 ENCOUNTER — HOSPITAL ENCOUNTER (OUTPATIENT)
Dept: INFUSION THERAPY | Age: 86
Setting detail: INFUSION SERIES
Discharge: HOME | End: 2021-09-27
Payer: MEDICARE

## 2021-09-27 VITALS
RESPIRATION RATE: 24 BRPM | HEART RATE: 80 BPM | OXYGEN SATURATION: 97 % | SYSTOLIC BLOOD PRESSURE: 149 MMHG | DIASTOLIC BLOOD PRESSURE: 79 MMHG | TEMPERATURE: 98 F

## 2021-09-27 DIAGNOSIS — M81.0 OSTEOPOROSIS, UNSPECIFIED OSTEOPOROSIS TYPE, UNSPECIFIED PATHOLOGICAL FRACTURE PRESENCE: Primary | ICD-10-CM

## 2021-09-27 PROCEDURE — 96372 THER/PROPH/DIAG INJ SC/IM: CPT

## 2021-09-27 PROCEDURE — 6360000002 HC RX W HCPCS: Performed by: INTERNAL MEDICINE

## 2021-09-27 RX ADMIN — DENOSUMAB 60 MG: 60 INJECTION SUBCUTANEOUS at 08:57

## 2021-10-19 ENCOUNTER — HOSPITAL ENCOUNTER (OUTPATIENT)
Age: 86
Discharge: HOME | End: 2021-10-21
Payer: MEDICARE

## 2021-10-19 ENCOUNTER — OFFICE VISIT (OUTPATIENT)
Dept: PRIMARY CARE CLINIC | Age: 86
End: 2021-10-19
Payer: MEDICARE

## 2021-10-19 ENCOUNTER — HOSPITAL ENCOUNTER (OUTPATIENT)
Dept: GENERAL RADIOLOGY | Age: 86
Discharge: HOME | End: 2021-10-21
Payer: MEDICARE

## 2021-10-19 VITALS
BODY MASS INDEX: 35.94 KG/M2 | RESPIRATION RATE: 17 BRPM | WEIGHT: 210.5 LBS | HEART RATE: 78 BPM | HEIGHT: 64 IN | SYSTOLIC BLOOD PRESSURE: 130 MMHG | TEMPERATURE: 98.6 F | DIASTOLIC BLOOD PRESSURE: 88 MMHG | OXYGEN SATURATION: 97 %

## 2021-10-19 DIAGNOSIS — R29.6 FALL IN ELDERLY PATIENT: ICD-10-CM

## 2021-10-19 DIAGNOSIS — E78.2 MIXED HYPERLIPIDEMIA: ICD-10-CM

## 2021-10-19 DIAGNOSIS — S80.11XA HEMATOMA OF RIGHT LOWER LEG: ICD-10-CM

## 2021-10-19 DIAGNOSIS — E03.9 ACQUIRED HYPOTHYROIDISM: ICD-10-CM

## 2021-10-19 DIAGNOSIS — L03.113 CELLULITIS OF FOREARM, RIGHT: Primary | ICD-10-CM

## 2021-10-19 DIAGNOSIS — I10 ESSENTIAL HYPERTENSION: ICD-10-CM

## 2021-10-19 PROCEDURE — 99213 OFFICE O/P EST LOW 20 MIN: CPT | Performed by: INTERNAL MEDICINE

## 2021-10-19 PROCEDURE — 73590 X-RAY EXAM OF LOWER LEG: CPT

## 2021-10-19 RX ORDER — AMLODIPINE BESYLATE AND BENAZEPRIL HYDROCHLORIDE 5; 10 MG/1; MG/1
1 CAPSULE ORAL 2 TIMES DAILY
Qty: 180 CAPSULE | Refills: 0 | Status: SHIPPED
Start: 2021-10-19 | End: 2021-12-03 | Stop reason: SDUPTHER

## 2021-10-19 RX ORDER — SIMVASTATIN 20 MG
20 TABLET ORAL DAILY
Qty: 90 TABLET | Refills: 0 | Status: SHIPPED
Start: 2021-10-19 | End: 2021-12-03 | Stop reason: SDUPTHER

## 2021-10-19 RX ORDER — DOXYCYCLINE HYCLATE 100 MG/1
100 CAPSULE ORAL 2 TIMES DAILY
Qty: 40 CAPSULE | Refills: 0 | Status: SHIPPED | OUTPATIENT
Start: 2021-10-19 | End: 2021-11-08

## 2021-10-19 RX ORDER — CLINDAMYCIN PHOSPHATE 10 MG/G
GEL TOPICAL
Qty: 30 G | Refills: 0 | Status: SHIPPED | OUTPATIENT
Start: 2021-10-19 | End: 2021-10-26

## 2021-10-19 RX ORDER — PANTOPRAZOLE SODIUM 40 MG/1
40 TABLET, DELAYED RELEASE ORAL DAILY
Qty: 90 TABLET | Refills: 0 | Status: SHIPPED
Start: 2021-10-19 | End: 2021-12-03 | Stop reason: SDUPTHER

## 2021-10-19 RX ORDER — CLOBETASOL PROPIONATE 0.5 MG/G
CREAM TOPICAL
COMMUNITY
Start: 2021-10-16

## 2021-10-19 RX ORDER — LEVOTHYROXINE SODIUM 0.05 MG/1
50 TABLET ORAL DAILY
Qty: 90 TABLET | Refills: 0 | Status: SHIPPED
Start: 2021-10-19 | End: 2021-12-03 | Stop reason: SDUPTHER

## 2021-10-19 NOTE — PROGRESS NOTES
Chief Complaint   Patient presents with    Hypertension     pt here for a checkup denies chest pain n/v or vertigo     Wound Check     right arm fell in the gravel when trimming janine bushes wants dr to look at it.  Other     pt denies any exposure and has had vaccines. HPI:  Patient is here for follow-up on hypertension hypothyroidism and hyperlipidemia refills on her medications and and acute visit. Patient fell in her yard while she is cutting rosebushes injured her right leg below the knee also injured skin of the right elbow on the forearm side. That was a week ago she developed redness and pus drainage from the skin of the right elbow no fever no chills. Her daughter-in-law is a nurse she applied some antiseptic med on it. She noticed bluish discoloration around the anterior surface of the ankle    Past Medical History, Surgical History, and Family History has been reviewed and updated.     Review of Systems:  Constitutional:  No fever, no fatigue, no chills, no headaches, no weight change  Dermatology:  No rash, no mole, no dry or sensitive skin  ENT:  No cough, no sore throat, no sinus pain, no runny nose, no ear pain  Cardiology:  No chest pain, no palpitations, no leg edema, no shortness of breath, no PND  Gastroenterology:  No dysphagia, no abdominal pain, no nausea, no vomiting, no constipation, no diarrhea, no heartburn  Musculoskeletal:  No joint pain, no leg cramps, no back pain, no muscle aches  Respiratory:  No shortness of breath, no orthopnea, no wheezing, no ASHLEY, no hemoptysis  Urology:  No blood in the urine, no urinary frequency, no urinary incontinence, no urinary urgency, no nocturia, no dysuria    Vitals:    10/19/21 1308   BP: 130/88   Pulse: 78   Resp: 17   Temp: 98.6 °F (37 °C)   SpO2: 97%   Weight: 210 lb 8 oz (95.5 kg)   Height: 5' 3.5\" (1.613 m)       General:  Patient alert and oriented x 3, NAD, pleasant  HEENT:  Atraumatic, normocephalic, PERRLA, EOMI, clear conjunctiva, TMs clear, nose-clear, throat - no erythema  Neck:  Supple, no goiter, no carotid bruits, no LAD  Lungs:  CTA   Heart:  RRR, no murmurs, gallops or rubs  Abdomen:  Soft/nt/nd, + bowel sounds  Extremities:  No clubbing, cyanosis or edema. Right elbow skin abrasion with redness pus formation crust on the upper forearm. Warm to touch tender  Skin: unremarkable    Erythema edema redness tenderness on the upper right tibia with bluish discoloration on the anterior ankle with no tenderness.     Hemoglobin A1C   Date Value Ref Range Status   11/03/2010 5.7 4.8 - 6.0 % Final     Cholesterol, Total   Date Value Ref Range Status   12/29/2017 146 0 - 199 mg/dL Final     Triglycerides   Date Value Ref Range Status   12/29/2017 112 0 - 149 mg/dL Final     HDL   Date Value Ref Range Status   12/29/2017 49 >40 mg/dL Final     LDL Calculated   Date Value Ref Range Status   12/29/2017 75 0 - 99 mg/dL Final     VLDL Cholesterol Calculated   Date Value Ref Range Status   12/29/2017 22 mg/dL Final     Sodium   Date Value Ref Range Status   06/25/2021 140 132 - 146 mmol/L Final     Potassium   Date Value Ref Range Status   06/25/2021 4.7 3.5 - 5.0 mmol/L Final     Chloride   Date Value Ref Range Status   06/25/2021 105 98 - 107 mmol/L Final     CO2   Date Value Ref Range Status   06/25/2021 24 22 - 29 mmol/L Final     BUN   Date Value Ref Range Status   06/25/2021 34 (H) 6 - 23 mg/dL Final     CREATININE   Date Value Ref Range Status   06/25/2021 1.6 (H) 0.5 - 1.0 mg/dL Final     Glucose   Date Value Ref Range Status   06/25/2021 112 (H) 74 - 99 mg/dL Final     Calcium   Date Value Ref Range Status   06/25/2021 9.5 8.6 - 10.2 mg/dL Final     Total Protein   Date Value Ref Range Status   06/25/2021 7.4 6.4 - 8.3 g/dL Final     Albumin   Date Value Ref Range Status   06/25/2021 4.5 3.5 - 5.2 g/dL Final     Total Bilirubin   Date Value Ref Range Status   06/25/2021 0.4 0.0 - 1.2 mg/dL Final     Alkaline Phosphatase   Date Value Ref Range Status   06/25/2021 54 35 - 104 U/L Final     AST   Date Value Ref Range Status   06/25/2021 16 0 - 31 U/L Final     ALT   Date Value Ref Range Status   06/25/2021 17 0 - 32 U/L Final     GFR Non-   Date Value Ref Range Status   06/25/2021 30 >=60 mL/min/1.73 Final     Comment:     Chronic Kidney Disease: less than 60 ml/min/1.73 sq.m. Kidney Failure: less than 15 ml/min/1.73 sq.m. Results valid for patients 18 years and older. GFR    Date Value Ref Range Status   06/25/2021 37  Final        No results found. Assessment/Plan: Elderly fall                                   Right elbow cellulitis. Doxycycline 100 mg capsule twice a day and clindamycin gel 1% topically to apply twice daily                                   Right tibia hematoma rule out hairline fracture                                   Hypertension controlled                                    Hypothyroidism controlled    Outpatient Encounter Medications as of 10/19/2021   Medication Sig Dispense Refill    amLODIPine-benazepril (LOTREL) 5-10 MG per capsule Take 1 capsule by mouth 2 times daily 180 capsule 0    levothyroxine (SYNTHROID) 50 MCG tablet Take 1 tablet by mouth Daily 90 tablet 0    pantoprazole (PROTONIX) 40 MG tablet Take 1 tablet by mouth daily 90 tablet 0    simvastatin (ZOCOR) 20 MG tablet Take 1 tablet by mouth daily 90 tablet 0    clindamycin (CLEOCIN-T) 1 % gel Apply topically 2 times daily. 30 g 0    doxycycline hyclate (VIBRAMYCIN) 100 MG capsule Take 1 capsule by mouth 2 times daily for 20 days 40 capsule 0    albuterol sulfate HFA (VENTOLIN HFA) 108 (90 Base) MCG/ACT inhaler Inhale 2 puffs into the lungs 4 times daily as needed for Wheezing 3 Inhaler 1    miconazole (ZEASORB-AF) 2 % powder Apply topically 2 times daily.  100 g 0    ibuprofen (ADVIL;MOTRIN) 800 MG tablet Take 1 tablet by mouth every 8 hours as needed for Pain 25 tablet 0    VITAMIN E PO Take 1 capsule by mouth daily      aspirin 81 MG EC tablet Take 81 mg by mouth daily Last dose  1 week ago      clobetasol (TEMOVATE) 0.05 % cream       [DISCONTINUED] amLODIPine-benazepril (LOTREL) 5-10 MG per capsule Take 1 capsule by mouth 2 times daily 180 capsule 0    [DISCONTINUED] levothyroxine (SYNTHROID) 50 MCG tablet Take 1 tablet by mouth Daily 90 tablet 0    [DISCONTINUED] pantoprazole (PROTONIX) 40 MG tablet Take 1 tablet by mouth daily 90 tablet 0    [DISCONTINUED] simvastatin (ZOCOR) 20 MG tablet Take 1 tablet by mouth daily 90 tablet 0    denosumab (PROLIA) 60 MG/ML SOSY SC injection Inject 1 mL into the skin once for 1 dose 1 mL 0    clotrimazole-betamethasone (LOTRISONE) 1-0.05 % cream Apply topically 2 times daily. (Patient not taking: Reported on 7/8/2021) 45 g 1     No facility-administered encounter medications on file as of 10/19/2021. Monie Loyd was seen today for hypertension, wound check and other. Diagnoses and all orders for this visit:    Cellulitis of forearm, right  -     clindamycin (CLEOCIN-T) 1 % gel; Apply topically 2 times daily. -     doxycycline hyclate (VIBRAMYCIN) 100 MG capsule; Take 1 capsule by mouth 2 times daily for 20 days    Essential hypertension  -     amLODIPine-benazepril (LOTREL) 5-10 MG per capsule; Take 1 capsule by mouth 2 times daily    Acquired hypothyroidism  -     levothyroxine (SYNTHROID) 50 MCG tablet; Take 1 tablet by mouth Daily    Mixed hyperlipidemia  -     simvastatin (ZOCOR) 20 MG tablet; Take 1 tablet by mouth daily    Fall in elderly patient  -     XR TIBIA FIBULA RIGHT (2 VIEWS); Future    Hematoma of right lower leg  -     XR TIBIA FIBULA RIGHT (2 VIEWS); Future    Other orders  -     pantoprazole (PROTONIX) 40 MG tablet; Take 1 tablet by mouth daily         There are no Patient Instructions on file for this visit.            Lawson Pallas, MD   10/19/21

## 2021-10-20 ENCOUNTER — TELEPHONE (OUTPATIENT)
Dept: PRIMARY CARE CLINIC | Age: 86
End: 2021-10-20

## 2021-12-03 DIAGNOSIS — E03.9 ACQUIRED HYPOTHYROIDISM: ICD-10-CM

## 2021-12-03 DIAGNOSIS — I10 ESSENTIAL HYPERTENSION: Primary | ICD-10-CM

## 2021-12-03 DIAGNOSIS — E78.2 MIXED HYPERLIPIDEMIA: ICD-10-CM

## 2021-12-03 RX ORDER — SIMVASTATIN 20 MG
20 TABLET ORAL DAILY
Qty: 90 TABLET | Refills: 0 | Status: SHIPPED
Start: 2021-12-03 | End: 2022-03-02 | Stop reason: SDUPTHER

## 2021-12-03 RX ORDER — AMLODIPINE BESYLATE AND BENAZEPRIL HYDROCHLORIDE 5; 10 MG/1; MG/1
1 CAPSULE ORAL 2 TIMES DAILY
Qty: 180 CAPSULE | Refills: 0 | Status: SHIPPED
Start: 2021-12-03 | End: 2022-01-28 | Stop reason: CLARIF

## 2021-12-03 RX ORDER — LEVOTHYROXINE SODIUM 0.05 MG/1
50 TABLET ORAL DAILY
Qty: 90 TABLET | Refills: 0 | Status: SHIPPED
Start: 2021-12-03 | End: 2022-03-02 | Stop reason: SDUPTHER

## 2021-12-03 RX ORDER — PANTOPRAZOLE SODIUM 40 MG/1
40 TABLET, DELAYED RELEASE ORAL DAILY
Qty: 90 TABLET | Refills: 0 | Status: SHIPPED
Start: 2021-12-03 | End: 2022-03-02 | Stop reason: SDUPTHER

## 2022-01-11 DIAGNOSIS — M17.12 OSTEOARTHRITIS OF LEFT KNEE, UNSPECIFIED OSTEOARTHRITIS TYPE: Primary | ICD-10-CM

## 2022-01-12 ENCOUNTER — OFFICE VISIT (OUTPATIENT)
Dept: ORTHOPEDIC SURGERY | Age: 87
End: 2022-01-12
Payer: MEDICARE

## 2022-01-12 VITALS — HEIGHT: 64 IN | BODY MASS INDEX: 34.15 KG/M2 | WEIGHT: 200 LBS | TEMPERATURE: 98 F

## 2022-01-12 DIAGNOSIS — M79.652 LEFT THIGH PAIN: Primary | ICD-10-CM

## 2022-01-12 DIAGNOSIS — M17.12 OSTEOARTHRITIS OF LEFT KNEE, UNSPECIFIED OSTEOARTHRITIS TYPE: ICD-10-CM

## 2022-01-12 PROCEDURE — 99203 OFFICE O/P NEW LOW 30 MIN: CPT | Performed by: ORTHOPAEDIC SURGERY

## 2022-01-12 NOTE — PROGRESS NOTES
each were discussed in detail, questions asked and answered and understood. We discussed the symptoms as well as physical findings and imaging results. Mrs. Zettie Cushing daughter was present throughout the visit as well and she contributed to the history and discussion. There is significant degeneration in the knee which is surprisingly causing little pain. The lateral thigh pain is below the trochanteric region and is does not suggest the typical trochanteric bursitis. PLAN: Recommendations for physical therapy to help improve comfort and function and improve stability of her function throughout her daily life. We will follow-up in about 4 weeks.         Patient Active Problem List   Diagnosis    Phalanx, proximal fracture of R small finger     Degenerative arthritis of left knee    Medial meniscus tear    Synovitis of knee    Pseudomeningocele    Difficult airway for intubation    Acute respiratory failure (HCC)    Allergic reaction caused by a drug-morphine    Lumbosacral spondylosis without myelopathy    Postlaminectomy syndrome, lumbar region    Chronic back pain    Depression    Hypothyroid    Respiratory distress    Senile osteoporosis    RUQ abdominal pain    Gastric ulcer due to nonsteroidal anti-inflammatory drug (NSAID)    Osteoporosis    Essential hypertension       Past Medical History:   Diagnosis Date    Allergy     Arthritis     Chest pain 6-    lexiscan stress test    Chronic back pain     Depression     GERD (gastroesophageal reflux disease)     Headache(784.0)     History of blood transfusion     Hyperlipidemia     Hypertension     Hypothyroid     Incontinence of urine     pt has implant in bladder medtronic model IM [id number]    Memory changes     Pneumonia     Thyroid disease     Urinary tract infection     Varicella     Vision abnormalities        Past Surgical History:   Procedure Laterality Date    APPENDECTOMY      BACK SURGERY      BLADDER SURGERY  CHOLECYSTECTOMY      CLOSED REDUCTION OF A JOINT  10-    right little finger    CYSTOSCOPY  [id number]    BOTOX INJECTION    CYSTOSCOPY  6/10/14    Bladder botox injection    CYSTOSCOPY  12/9/14    Botox injection bladder    CYSTOSCOPY  [id number]    BOTOX    CYSTOSCOPY  [id number]    BOTOX    CYSTOSCOPY N/A 11/26/2019    CYSTOSCOPY  BOTOX 200 UNITS INJECTION--INTERSTIM FULL EXPLANT performed by Constance Vargas MD at [facility] 7/30/2020    CYSTOSCOPY, BOTOX  INJECTION 300 UNITS performed by Constance Vargas MD at [place] [address] ECHO COMPL W DOP COLOR FLOW  7/20/2012         FRACTURE SURGERY      HEMORRHOID SURGERY  40 yrs ago   [address] [facility] ARTHROSCOPY  1/19/12    left     LUMBAR DRAIN IMPLANTATION  6/27/2012    OTHER SURGICAL HISTORY  07/23/2012    repair lumbar meningiocele with lumbar drain    OTHER SURGICAL HISTORY N/A 10/04/2016    sacral nerve stimulation    TONSILLECTOMY      UPPER GASTROINTESTINAL ENDOSCOPY N/A 9/20/2019    EGD ESOPHAGOGASTRODUODENOSCOPY performed by Ciro Shone, DO at [address] [facility] 11/25/2020    EGD BIOPSY performed by Benoit Pop MD at [address]  2008    left       Current Outpatient Medications   Medication Sig Dispense Refill    amLODIPine-benazepril (LOTREL) 5-10 MG per capsule Take 1 capsule by mouth 2 times daily 180 capsule 0    levothyroxine (SYNTHROID) 50 MCG tablet Take 1 tablet by mouth Daily 90 tablet 0    pantoprazole (PROTONIX) 40 MG tablet Take 1 tablet by mouth daily 90 tablet 0    simvastatin (ZOCOR) 20 MG tablet Take 1 tablet by mouth daily 90 tablet 0    clobetasol (TEMOVATE) 0.05 % cream       albuterol sulfate HFA (VENTOLIN HFA) 108 (90 Base) MCG/ACT inhaler Inhale 2 puffs into the lungs 4 times daily as needed for Wheezing 3 Inhaler 1    miconazole (ZEASORB-AF) 2 % powder Apply topically 2 times daily.  100 g 0    clotrimazole-betamethasone (LOTRISONE) 1-0.05 % cream Apply topically 2 times daily. 45 g 1    ibuprofen (ADVIL;MOTRIN) 800 MG tablet Take 1 tablet by mouth every 8 hours as needed for Pain 25 tablet 0    VITAMIN E PO Take 1 capsule by mouth daily      aspirin 81 MG EC tablet Take 81 mg by mouth daily Last dose  1 week ago      denosumab (PROLIA) 60 MG/ML SOSY SC injection Inject 1 mL into the skin once for 1 dose 1 mL 0     No current facility-administered medications for this visit. Allergies   Allergen Reactions    Morphine Sulfate Anaphylaxis    Vitamin K [Phytonadione] Hives    Penicillins Hives and Rash       Social History     Socioeconomic History    Marital status:      Spouse name: None    Number of children: None    Years of education: None    Highest education level: None   Occupational History    None   Tobacco Use    Smoking status: Never Smoker    Smokeless tobacco: Never Used   Vaping Use    Vaping Use: Never used   Substance and Sexual Activity    Alcohol use: Yes     Alcohol/week: 1.0 standard drink     Types: 1 Glasses of wine per week     Comment: daily    Drug use: No    Sexual activity: None   Other Topics Concern    None   Social History Narrative    None     Social Determinants of Health     Financial Resource Strain: Low Risk     Difficulty of Paying Living Expenses: Not hard at all   Food Insecurity: No Food Insecurity    Worried About Running Out of Food in the Last Year: Never true    Aniket of Food in the Last Year: Never true   Transportation Needs:     Lack of Transportation (Medical): Not on file    Lack of Transportation (Non-Medical):  Not on file   Physical Activity:     Days of Exercise per Week: Not on file    Minutes of Exercise per Session: Not on file   Stress:     Feeling of Stress : Not on file   Social Connections:     Frequency of Communication with Friends and Family: Not on file    Frequency of Social Gatherings with Friends and Family: Not on file    Attends Taoism Services: Not on file    Active Member of Clubs or Organizations: Not on file    Attends Club or Organization Meetings: Not on file    Marital Status: Not on file   Intimate Partner Violence:     Fear of Current or Ex-Partner: Not on file    Emotionally Abused: Not on file    Physically Abused: Not on file    Sexually Abused: Not on file   Housing Stability:     Unable to Pay for Housing in the Last Year: Not on file    Number of [place] in the Last Year: Not on file    Unstable Housing in the Last Year: Not on file       Family History   Problem Relation Age of Onset    Heart Disease Mother     Cancer Father     Cancer Sister          Review of Systems:   As follows except as previously noted in HPI:  Constitutional: Negative for chills, diaphoresis,  fever   Respiratory: Negative for cough, shortness of breath and wheezing. Cardiovascular: Negative for chest pain and palpitations. Neurological: Negative for dizziness, syncope,   GI / : abdominal pain or cramping  Musculoskeletal: see HPI       Objective:   Physical Exam   Constitutional: Oriented to person, place, and time. and appears well-developed and well-nourished. :   Head: Normocephalic and atraumatic. Neck: Neck supple. Eyes: EOM are normal.   Pulmonary/Chest: Effort normal.  No respiratory distress, no wheezes. Neurological: Alert and oriented to person  Skin: Skin is warm and dry. Lewis Moore DO    1/12/22  4:14 PM    All reasonable efforts have been made to minimize the risk of errors that may occur in the use of voice recognition and other electronic means of charting.
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
## 2022-01-17 ENCOUNTER — HOSPITAL ENCOUNTER (OUTPATIENT)
Dept: PHYSICAL THERAPY | Age: 87
Setting detail: THERAPIES SERIES
Discharge: HOME | End: 2022-01-17
Payer: MEDICARE
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
## 2022-01-24 ENCOUNTER — TELEPHONE (OUTPATIENT)
Dept: PRIMARY CARE CLINIC | Age: 87
End: 2022-01-24

## 2022-01-24 ENCOUNTER — HOSPITAL ENCOUNTER (OUTPATIENT)
Dept: PHYSICAL THERAPY | Age: 87
Setting detail: THERAPIES SERIES
Discharge: HOME | End: 2022-01-24
Payer: MEDICARE

## 2022-01-24 NOTE — PROGRESS NOTES
[facility] OUTPATIENT REHABILITATION  PHYSICAL THERAPY INITIAL EVALUATION         Date:  2022   Patient: Tangela Beard  : 1932  MRN: [id number]  Referring Provider: Maria L Reese DO   [address],  [facility]     Medical Diagnosis: Osteoarthritis of left knee, unspecified osteoarthritis type (M17.12   Onset date:  1 month  Mechanism of Injury: Insidious onset  Chief complaint: Intermittent pain left knee and thigh and lateral hip     SUBJECTIVE:     Past Medical History  Past Medical History:   Diagnosis Date    Allergy     Arthritis     Chest pain 2014    lexiscan stress test    Chronic back pain     Depression     GERD (gastroesophageal reflux disease)     Headache(784.0)     History of blood transfusion     Hyperlipidemia     Hypertension     Hypothyroid     Incontinence of urine     pt has implant in bladder medtronic model IM 3058    Memory changes     Pneumonia     Thyroid disease     Urinary tract infection     Varicella     Vision abnormalities      Past Surgical History:   Procedure Laterality Date    APPENDECTOMY      BACK SURGERY      BLADDER SURGERY      CHOLECYSTECTOMY      CLOSED REDUCTION OF A JOINT  10-    right little finger    CYSTOSCOPY  [id number]    BOTOX INJECTION    CYSTOSCOPY  6/10/14    Bladder botox injection    CYSTOSCOPY  14    Botox injection bladder    CYSTOSCOPY  [id number]    BOTOX    CYSTOSCOPY  [id number]    BOTOX    CYSTOSCOPY N/A 2019    CYSTOSCOPY  BOTOX 200 UNITS INJECTION--INTERSTIM FULL EXPLANT performed by Connor Cadet MD at [facility] CYSTOSCOPY N/A 2020    CYSTOSCOPY, BOTOX  INJECTION 300 UNITS performed by Connor Cadet MD at [facility] ECHO COMPL W DOP COLOR FLOW  2012         FRACTURE SURGERY      HEMORRHOID SURGERY  40 yrs ago    HYSTERECTOMY      KNEE ARTHROSCOPY  12    left     LUMBAR DRAIN IMPLANTATION  2012    OTHER fracture or dislocation. Tricompartmental osteoarthritis is present with severe findings at the medial weight-bearing compartment and chondrocalcinosis at the lateral weight-bearing compartment. Tricompartmental osteoarthritis with severe findings at the medial weight-bearing compartment and chondrocalcinosis evident at the lateral weight-bearing compartment. Pain: 0-10/10  Aggravated by: walking, standing, stairs  Relieved by: rest    Symptom Type/Quality: aching  Location[de-identified] Knee: global knee and left lateral thigh  Behavior: condition is staying the same    Occupation: retired    Hobbies:  none    Patient Goals: Pain control    Medical Management for Current Problem:  [] Chiro  []  CT:  []  Injection:   []  Meds:   []  MRI:  []  Ortho  []  PCP  []  PT/OT  [x]  X-ray:  []  Surgery:  []  Other:     Precautions/Contraindications: none    OBJECTIVE:     Inspection:  Standing  Knees:  [x] Normal  [] Genu Valgus [] Genu Varus  [] Genu Recurvatum    Tibia:  [x] Normal  [] Tibial Varus  [] Tibial Varum    Feet:  [x] Normal  [] Calcaneal Valgus   [] Calcaneal Varus   [] Hallux Valgus    [] Supination  [] Pronation          [] Pes Planus    [] Pes Cavus      Gait:  Antalgic LLE with STC  Joint/Motion:    Knee:       Right:               AROM: WNL       Left:                AROM: WNL      Strength:          Knee:                 Right:   WNL               Left: Flexion 4-/5,                         Extension 4/5    Palpation: Tender to palpation medial joint line of left knee and left hip greater trochanter     Joint Integrity: crepitus    Special Tests/Functional Screens:     [x] Posterior Drawer []+ / [x] -   [x] Anterior Drawer []+ / [x] -   [x] Valgus Stress []+ / [x] -  [x] Varus Stress []+ / [x] -   [x] Holley []+ / [x] -   [x] PATELLA SCOURING: []+ / [x] - [x] Appley compression: []+ / [x] -   [x] Appley distraction: []+ / [x] -  [x] Adan's sign: []+ / [x] -              Special test comments: ASSESSMENT     Patient's main problems are pain and weakness of LLE. Treatment will focus on edema and pain reduction,   strengthening, HEP to  improve community ambulation and ease of climbing stairs. Problems:    1. Pain reported 0-10/10 left knee and thigh  2. Decreased Strength LLE  3. Antalgic gait with STC  4. Decreased balance  5. Decreased functional ability with walking, standing, lifting, pushing, pulling, stair climbing    [x] There are no barriers affecting plan of care or recovery    [] Barriers to this patient's plan of care or recovery include. Domestic Concerns:  [x] No  [] Yes:       Short Term goals (3 weeks)  1. Decrease reported pain to 5/10  2. Increase Strength by 1/3 mm grade  3. Independent with Home Exercise Programs      Long Term goals (6 weeks)  1. Decrease reported pain to 0-3/10  2. Increase Strength to WNL LLE   3. Normal gait with STC  4. Normal balance  5. Able to perform/complete the following functions/tasks:  walking, standing, lifting, pushing, pulling, stair climbing      Outcome Measure:  LEFS=40    Rehab Potential: [x] Good  [] Fair  [] Poor    PLAN     Treatment will focus on improving  strength to improve gait in community and pain control using therex, PRE, ROM , balance training and modalities. Specific Provider Orders:Eval and treat    Physical therapy plan of care is established based on physician order, patient diagnosis and clinical assessment.     Treatment Plan:  [x] Therapeutic Exercise  [x] Therapeutic Activity  [x] Neuromuscular Re-education   [] Gait Training  [x] Balance Training  [] Aerobic conditioning  [] Manual Therapy  [] Massage/Fascial release   [] Work/Sport specific activities    [] Pain Neuroscience [x] Cold/hotpack  [] Vasocompression  [] Electrical Stimulation  [] Lumbar/Cervical Traction  [] Ultrasound   [] Iontophoresis: 4 mg/mL Dexamethasone Sodium Phosphate 40-80 mAmin        [x] Instruction in HEP      []  Medication allergies reviewed for use of Dexamethasone Sodium Phosphate 4mg/ml  with iontophoresis treatments. Patient is not allergic. Suggested Professional Referral: [x] No  [] Yes:     The following CPT codes are likely to be used in the care of this patient: 71985 PT Evaluation: Moderate Complexity   88465 Therapeutic Exercise   80531 Therapeutic Activities   15541 Gait Training     Patient Education:  [x] Plans/Goals, Risks/Benefits discussed  [x] Home exercise program  Method of Education: [x] Verbal  [x] Demo  [x] Written  Comprehension of Education:  [x] Verbalizes understanding. [x] Demonstrates understanding. [] Needs Review. [] Demonstrates/verbalizes understanding of HEP/Ed previously given. Frequency:  2 times per week for 6 weeks    Patient understands diagnosis/prognosis and consents to treatment, plan and goals: [x] Yes    [] No     Thank you for the opportunity to work with your patient. If you have questions or comments, please contact me at [phone]; fax: [phone]. Electronically signed by: Deric Sepulveda PT         Please sign Physician's Certification and return to:  [address] [place] [address], [address] [place] [id number]  Dept: [phone]  Dept Fax: [phone]: [phone] Certification / Comments     Frequency/Duration 2 / week for  6 weeks. Certification period From: 1/24/2022   To: 3/24/2022    I have reviewed the Plan of Care established for skilled therapy services and certify that the services are required and that they will be provided while the patient is under my care.     Physician's Comments/Revisions:               Physician's Printed Name:                                           [de-identified] Signature:                                                               Date:

## 2022-01-24 NOTE — PROGRESS NOTES
[facility]  Physical Therapy Daily Treatment Note    Date: 2022  Patient Name: Nicole Delacruz  : 1932   MRN: [id number]  [facility]: na  DOSx: na  Referring Provider: Brett Sanchez DO  [address]     Medical Diagnosis:Osteoarthritis of left knee, unspecified osteoarthritis type (M17.12   Outcome Measure: LEFS= 40    S: See eval  O:  Pt given HEP to compliment HEP from HHPT  Time  14:10-14:50  2x/wk, 6 weeks     Visit       Pain    7/10     ROM  WNL     Modalities       MH                                      THEREX                            Nustep         TG squats        TG Calf raises      HS curls      Marching       Hip abd       Hip ext      LAQ                  THERAPEUTIC ACTIVITIES Large, functional, dynamic, global movements used to build strength, balance, endurance, and flexibility and to improve physical performance. Step-ups - FWD       Step-ups - LAT      Step-ups - BWD        Sit to stand                               NEUROMUSCULAR RE-ed  Feedback and cues necessary for developing neuromuscular control. Movement education and guided movement interventions used to improve performance and control. For safe ambulation in community and home    March on DD      Step up and over      SLS      FWD WITH CANE, Ball push      FWD with irregular surface      FWD with cone      Marching gait      Side stepping      Retrowalk      Heel to toe      A: Tolerated well.   elevation.   P: Continue with rehab plan  Griselda Bernardo PT    Treatment Charges: Mins Units   Initial Evaluation 32 1   Re-Evaluation     Ther Exercise         TE 8 1   Manual Therapy     MT     Ther Activities        TA     Gait Training          GT     Neuro Re-education NR     Modalities     Non-Billable Service Time     Other     Total Time/Units 40 2

## 2022-01-25 NOTE — TELEPHONE ENCOUNTER
Patient was called and was made aware the prescriptions were all e scribed to her pharmacy on 12/03/21. Patient denied getting her BP med with the  of other meds escribed that day. Asked patient to call pharmacy and patient declined stating she is unable to . I called pharmacy and was informed the medication in question was too soon for refill per patient insurance and pharmacy had the script on file waiting for patient to contact them when refill was needed. Pharmacy will prepare med and was asked to call patient to notify when ready and pharmacy agreed.

## 2022-01-26 ENCOUNTER — HOSPITAL ENCOUNTER (OUTPATIENT)
Dept: PHYSICAL THERAPY | Age: 87
Setting detail: THERAPIES SERIES
Discharge: HOME | End: 2022-01-26
Payer: MEDICARE

## 2022-01-26 PROCEDURE — 97110 THERAPEUTIC EXERCISES: CPT | Performed by: PHYSICAL THERAPIST

## 2022-01-26 PROCEDURE — 97162 PT EVAL MOD COMPLEX 30 MIN: CPT | Performed by: PHYSICAL THERAPIST

## 2022-01-28 ENCOUNTER — TELEPHONE (OUTPATIENT)
Dept: PRIMARY CARE CLINIC | Age: 87
End: 2022-01-28

## 2022-01-28 RX ORDER — AMLODIPINE BESYLATE 10 MG/1
10 TABLET ORAL DAILY
Qty: 90 TABLET | Refills: 1 | Status: SHIPPED
Start: 2022-01-28 | End: 2022-03-02 | Stop reason: SDUPTHER

## 2022-01-28 RX ORDER — LISINOPRIL 20 MG/1
20 TABLET ORAL DAILY
Qty: 90 TABLET | Refills: 1 | Status: SHIPPED
Start: 2022-01-28 | End: 2022-03-02 | Stop reason: SDUPTHER

## 2022-01-28 NOTE — TELEPHONE ENCOUNTER
Please advise patient that Lotrel was substituted by two medications   the first one is amlodipine 10 mg tablet once a day     and   the second one is lisinopril 20 mg tablet daily to be taken also once a day   prescriptions were sent to the pharmacy

## 2022-02-01 ENCOUNTER — HOSPITAL ENCOUNTER (OUTPATIENT)
Dept: PHYSICAL THERAPY | Age: 87
Setting detail: THERAPIES SERIES
Discharge: HOME | End: 2022-02-01
Payer: MEDICARE

## 2022-02-01 PROCEDURE — 97110 THERAPEUTIC EXERCISES: CPT

## 2022-02-01 NOTE — PROGRESS NOTES
[facility]  Physical Therapy Daily Treatment Note    Date: 2022  Patient Name: María Adams  : 1932   MRN: [id number]  DOInjury: na  DOSx: na  Referring Provider: Jake Wiley, DO  [address]     Medical Diagnosis:Osteoarthritis of left knee, unspecified osteoarthritis type (M17.12   Outcome Measure: LEFS= 40    S: Patient states pain left hip to knee. Dr Destin Pinzon told her she had hip bursitis as well as OA left  knee. She is performing HEP. While patient on Nustep, first exercise, she asked about billing. Estimate was run and determined that patient has $40 co-pay per visit. Patient states she will not be returning after today due to $40 co-pay. Patient given exercises she can progress to once she tolerates initial program.  Patient states a good understanding. RTD 22. O:  Pt given written additional exercises, green theraband. See copy in chart. Time  2582-7299 2x/wk, 6 weeks    Visit      Pain    7/10    ROM  WNL    Modalities      MH X 11 minutes  After 11 minutes patient stated heat was causing discomfort in hip and was stopped   THEREX      Nustep  L5 x 4 min, seat #9      TG squats       TG Calf raises     HS curls Left x 15    Marching  Attempted alternating, movement of right produced pain left; performed hip flexion left only 2 x 10    Hip abd Seated with theraband x 20     Standing x 10, x 5 with left only, in painfree range; unable to alternate without pain in left     Alt. attempted but produced pain left hip    Hip ext     LAQ     THERAPEUTIC ACTIVITIES Large, functional, dynamic, global movements used to build strength, balance, endurance, and flexibility and to improve physical performance. Step-ups - FWD      Step-ups - LAT     Step-ups - BWD       Sit to stand     A: Tolerated well. Patient has a high co-pay and is going to continue with HEP at this time.   P: Patient is discharged, will continue with HEP, RTD next week.   Brittny Dueñas, MINE    Treatment Charges: Mins Units   Initial Evaluation     Re-Evaluation     Ther Exercise         TE 33 2   Manual Therapy     MT     Ther Activities        TA     Gait Training          GT     Neuro Re-education NR     Modalities 11, MH only, no charge 0   Non-Billable Service Time     Other     Total Time/Units 44 2

## 2022-02-03 ENCOUNTER — HOSPITAL ENCOUNTER (OUTPATIENT)
Dept: PHYSICAL THERAPY | Age: 87
Setting detail: THERAPIES SERIES
End: 2022-02-03
Payer: MEDICARE

## 2022-02-09 ENCOUNTER — OFFICE VISIT (OUTPATIENT)
Dept: ORTHOPEDIC SURGERY | Age: 87
End: 2022-02-09
Payer: MEDICARE

## 2022-02-09 VITALS — WEIGHT: 200 LBS | TEMPERATURE: 98 F | HEIGHT: 64 IN | BODY MASS INDEX: 34.15 KG/M2

## 2022-02-09 DIAGNOSIS — M70.62 TROCHANTERIC BURSITIS, LEFT HIP: ICD-10-CM

## 2022-02-09 PROCEDURE — 99213 OFFICE O/P EST LOW 20 MIN: CPT | Performed by: ORTHOPAEDIC SURGERY

## 2022-02-09 PROCEDURE — 20610 DRAIN/INJ JOINT/BURSA W/O US: CPT | Performed by: ORTHOPAEDIC SURGERY

## 2022-02-09 RX ORDER — TRIAMCINOLONE ACETONIDE 40 MG/ML
40 INJECTION, SUSPENSION INTRA-ARTICULAR; INTRAMUSCULAR ONCE
Status: COMPLETED | OUTPATIENT
Start: 2022-02-09 | End: 2022-02-09

## 2022-02-09 RX ADMIN — TRIAMCINOLONE ACETONIDE 40 MG: 40 INJECTION, SUSPENSION INTRA-ARTICULAR; INTRAMUSCULAR at 14:08

## 2022-02-14 ENCOUNTER — TELEPHONE (OUTPATIENT)
Dept: PRIMARY CARE CLINIC | Age: 87
End: 2022-02-14

## 2022-02-14 DIAGNOSIS — I10 ESSENTIAL HYPERTENSION: ICD-10-CM

## 2022-02-14 DIAGNOSIS — M81.0 OSTEOPOROSIS, UNSPECIFIED OSTEOPOROSIS TYPE, UNSPECIFIED PATHOLOGICAL FRACTURE PRESENCE: Primary | ICD-10-CM

## 2022-02-16 RX ORDER — DENOSUMAB 60 MG/ML
60 INJECTION SUBCUTANEOUS ONCE
Qty: 1 EACH | Refills: 0 | Status: SHIPPED | OUTPATIENT
Start: 2022-02-16 | End: 2022-09-27

## 2022-03-02 DIAGNOSIS — J20.9 ACUTE BRONCHITIS, UNSPECIFIED ORGANISM: ICD-10-CM

## 2022-03-02 DIAGNOSIS — E03.9 ACQUIRED HYPOTHYROIDISM: ICD-10-CM

## 2022-03-02 DIAGNOSIS — E78.2 MIXED HYPERLIPIDEMIA: ICD-10-CM

## 2022-03-02 RX ORDER — PANTOPRAZOLE SODIUM 40 MG/1
40 TABLET, DELAYED RELEASE ORAL DAILY
Qty: 90 TABLET | Refills: 0 | Status: SHIPPED
Start: 2022-03-02 | End: 2022-06-01 | Stop reason: SDUPTHER

## 2022-03-02 RX ORDER — AMLODIPINE BESYLATE 10 MG/1
10 TABLET ORAL DAILY
Qty: 90 TABLET | Refills: 0 | Status: SHIPPED
Start: 2022-03-02 | End: 2022-06-01 | Stop reason: SDUPTHER

## 2022-03-02 RX ORDER — SIMVASTATIN 20 MG
20 TABLET ORAL DAILY
Qty: 90 TABLET | Refills: 0 | Status: SHIPPED
Start: 2022-03-02 | End: 2022-06-01 | Stop reason: SDUPTHER

## 2022-03-02 RX ORDER — LISINOPRIL 20 MG/1
20 TABLET ORAL DAILY
Qty: 90 TABLET | Refills: 0 | Status: SHIPPED
Start: 2022-03-02 | End: 2022-06-01 | Stop reason: SDUPTHER

## 2022-03-02 RX ORDER — ALBUTEROL SULFATE 90 UG/1
2 AEROSOL, METERED RESPIRATORY (INHALATION) 4 TIMES DAILY PRN
Qty: 1 EACH | Refills: 2 | Status: SHIPPED | OUTPATIENT
Start: 2022-03-02

## 2022-03-02 RX ORDER — LEVOTHYROXINE SODIUM 0.05 MG/1
50 TABLET ORAL DAILY
Qty: 90 TABLET | Refills: 0 | Status: SHIPPED
Start: 2022-03-02 | End: 2022-06-01 | Stop reason: SDUPTHER

## 2022-03-29 ENCOUNTER — HOSPITAL ENCOUNTER (OUTPATIENT)
Dept: INFUSION THERAPY | Age: 87
Setting detail: INFUSION SERIES
Discharge: HOME | End: 2022-03-29
Payer: MEDICARE

## 2022-03-29 VITALS
RESPIRATION RATE: 24 BRPM | SYSTOLIC BLOOD PRESSURE: 144 MMHG | OXYGEN SATURATION: 98 % | TEMPERATURE: 98 F | HEART RATE: 80 BPM | DIASTOLIC BLOOD PRESSURE: 75 MMHG

## 2022-03-29 DIAGNOSIS — M81.0 OSTEOPOROSIS, UNSPECIFIED OSTEOPOROSIS TYPE, UNSPECIFIED PATHOLOGICAL FRACTURE PRESENCE: Primary | ICD-10-CM

## 2022-03-29 LAB
ALBUMIN SERPL-MCNC: 4.2 G/DL (ref 3.5–5.2)
ALP BLD-CCNC: 50 U/L (ref 35–104)
ALT SERPL-CCNC: 14 U/L (ref 0–32)
ANION GAP SERPL CALCULATED.3IONS-SCNC: 10 MMOL/L (ref 7–16)
AST SERPL-CCNC: 14 U/L (ref 0–31)
BILIRUB SERPL-MCNC: 0.4 MG/DL (ref 0–1.2)
BUN BLDV-MCNC: 25 MG/DL (ref 6–23)
CALCIUM SERPL-MCNC: 9.7 MG/DL (ref 8.6–10.2)
CHLORIDE BLD-SCNC: 102 MMOL/L (ref 98–107)
CO2: 26 MMOL/L (ref 22–29)
CREAT SERPL-MCNC: 1.3 MG/DL (ref 0.5–1)
GFR AFRICAN AMERICAN: 47
GFR NON-AFRICAN AMERICAN: 39 ML/MIN/1.73
GLUCOSE BLD-MCNC: 98 MG/DL (ref 74–99)
POTASSIUM SERPL-SCNC: 5.1 MMOL/L (ref 3.5–5)
SODIUM BLD-SCNC: 138 MMOL/L (ref 132–146)
TOTAL PROTEIN: 6.7 G/DL (ref 6.4–8.3)

## 2022-03-29 PROCEDURE — 6360000002 HC RX W HCPCS: Performed by: INTERNAL MEDICINE

## 2022-03-29 PROCEDURE — 36415 COLL VENOUS BLD VENIPUNCTURE: CPT

## 2022-03-29 PROCEDURE — 96372 THER/PROPH/DIAG INJ SC/IM: CPT

## 2022-03-29 PROCEDURE — 80053 COMPREHEN METABOLIC PANEL: CPT

## 2022-03-29 RX ADMIN — DENOSUMAB 60 MG: 60 INJECTION SUBCUTANEOUS at 09:52

## 2022-06-01 DIAGNOSIS — E03.9 ACQUIRED HYPOTHYROIDISM: ICD-10-CM

## 2022-06-01 DIAGNOSIS — E78.2 MIXED HYPERLIPIDEMIA: ICD-10-CM

## 2022-06-01 RX ORDER — SIMVASTATIN 20 MG
20 TABLET ORAL DAILY
Qty: 90 TABLET | Refills: 0 | Status: SHIPPED
Start: 2022-06-01 | End: 2022-09-01 | Stop reason: SDUPTHER

## 2022-06-01 RX ORDER — PANTOPRAZOLE SODIUM 40 MG/1
40 TABLET, DELAYED RELEASE ORAL DAILY
Qty: 90 TABLET | Refills: 0 | Status: SHIPPED
Start: 2022-06-01 | End: 2022-08-26 | Stop reason: SDUPTHER

## 2022-06-01 RX ORDER — LEVOTHYROXINE SODIUM 0.05 MG/1
50 TABLET ORAL DAILY
Qty: 90 TABLET | Refills: 0 | Status: SHIPPED
Start: 2022-06-01 | End: 2022-08-26 | Stop reason: SDUPTHER

## 2022-06-01 RX ORDER — AMLODIPINE BESYLATE 10 MG/1
10 TABLET ORAL DAILY
Qty: 90 TABLET | Refills: 0 | Status: SHIPPED
Start: 2022-06-01 | End: 2022-08-26 | Stop reason: SDUPTHER

## 2022-06-01 RX ORDER — LISINOPRIL 20 MG/1
20 TABLET ORAL DAILY
Qty: 90 TABLET | Refills: 0 | Status: SHIPPED
Start: 2022-06-01 | End: 2022-09-01 | Stop reason: SDUPTHER

## 2022-06-01 NOTE — TELEPHONE ENCOUNTER
Jt Harp MD, patient out of refills.  Rx pended for your signature/modification as appropriate    LOV: 10/19/21  Next: to be scheduled    Thank you,  Steven Gomez, PharmD, [facility]  Department, toll free: [phone], option 1

## 2022-06-01 NOTE — TELEPHONE ENCOUNTER
[facility] CLINICAL PHARMACY: ADHERENCE REVIEW  Identified care gap per [employer]: fills at [place]: ACE/ARB adherence    Last Visit: 10/19/21    Patient also appears to be prescribed: statin     Patient found in Outcomes MTM and is currently eligible for TIP - simvastatin refill  *claims 4/10/22 from another pharmacy - patient enrolled for lisinopril and simvastatin adherence monitoring    ASSESSMENT  ACE/ARB ADHERENCE    Insurance Records claims through 5/15/22 (MIGUEL Abdiaziz Youngandra = 83%; Potential Fail Date: 7/4/22): Lisinopril 20mg last filled on 1/28/22 for 90 day supply. Next refill due: 4/28/22    Per [employer] Portal: amlodipine-benazepril last filled on 1/25/22 for 30 day supply. - Changed @1/28/22 to amlodipine and lisinopril d/t Lotrel no longer covered (per 1/28/22 telephone encounter) - may have had @1 month overlap of supply, if using up amlodipine-benazepril combo tablet, then switching to amlodipine and lisinopril    Per chart, lisinopril reordered 3/2/22 for #90, 0 refills    BP Readings from Last 3 Encounters:   03/29/22 (!) 144/75   10/19/21 130/88   09/27/21 (!) 149/79     Estimated Creatinine Clearance: 32 mL/min (A) (based on SCr of 1.3 mg/dL (H)). [address] Records claims through 5/15/22 (MIGUEL [place] = Filled only once; Potential Fail Date: 7/30/22): Simvastatin 20mg last filled on 3/2/22 for 90 day supply. Next refill due: 5/31/22    Per chart, likely 0 refills remain    Lab Results   Component Value Date    CHOL 146 12/29/2017    TRIG 112 12/29/2017    HDL 49 12/29/2017    LDLCALC 75 12/29/2017     ALT   Date Value Ref Range Status   03/29/2022 14 0 - 32 U/L Final     AST   Date Value Ref Range Status   03/29/2022 14 0 - 31 U/L Final     The ASCVD Risk score (Daniel Pak, et al., 2013) failed to calculate for the following reasons:     The 2013 ASCVD risk score is only valid for ages 36 to 78     PLAN  The following are interventions that have been identified:   - Patient overdue refilling lisinopril 20mg daily (refill was due @4/28) and active on home medication list.   - Patient needs refills for simvastatin (refill due @now)  - Patient eligible for TIP in Outcomes MTM    Reached patient to review. Confirms she is taking the lisinopril (and amlodipine) individually now and is down to less than a week on all of her rxs - would like all 5 (lisinopril, amlodipine, levothyroxine, pantopraole and simvastatin) refilled. Advised would pend requests to PCP, and to plan that her [facility] would have ready to  by tomorrow afternoon unless she hears differently from me.     Future Appointments   Date Time Provider Ángel Olea   9/27/2022 11:00 AM [address] [facility] Just, PharmD, [facility]  Department, toll free: [phone], option 1

## 2022-06-02 NOTE — TELEPHONE ENCOUNTER
Noted the 5 rxs reordered, thank you! Spoke to patient's [employer] - confirm each received and ready to . As below, patient planned to . TIP completed.  Will close encounter.    =======================================================   For Pharmacy [facility] [address] in place:  No   Recommendation Provided To: Provider: 5 via Note to Provider and Patient/Caregiver: 2 via Telephone   Intervention Detail: Adherence Monitorin, CMR/TIP Completed and Refill(s) Provided   Gap Closed?: Yes    Intervention Accepted By: Provider: 5 and Patient/Caregiver: 2   Time Spent (min): 25

## 2022-08-18 ENCOUNTER — TELEPHONE (OUTPATIENT)
Dept: AUDIOLOGY | Age: 87
End: 2022-08-18

## 2022-08-18 NOTE — TELEPHONE ENCOUNTER
Received voicemail from patient that she wants to schedule an appointment. Called her to let her know the audiologist who schedules here is out this week and that we would call her next week however her phone just rang and rang with no answer and no voicemail (home phone)   Called mobile phone and daughter answered (its her number) and explained that Vero Rob is off and that she will get back to all her messages when she returns.  She thanked for the call and will let her mother know

## 2022-08-22 ENCOUNTER — TELEPHONE (OUTPATIENT)
Dept: AUDIOLOGY | Age: 87
End: 2022-08-22

## 2022-08-22 NOTE — TELEPHONE ENCOUNTER
Spoke to patient regarding appointment for hearing aids. Explained to patient she would have to call specific number to activate benefits through her insurance provider. Contact information given to patient.

## 2022-08-26 DIAGNOSIS — E03.9 ACQUIRED HYPOTHYROIDISM: ICD-10-CM

## 2022-08-26 RX ORDER — PANTOPRAZOLE SODIUM 40 MG/1
40 TABLET, DELAYED RELEASE ORAL DAILY
Qty: 90 TABLET | Refills: 0 | Status: SHIPPED | OUTPATIENT
Start: 2022-08-26

## 2022-08-26 RX ORDER — LEVOTHYROXINE SODIUM 0.05 MG/1
50 TABLET ORAL DAILY
Qty: 90 TABLET | Refills: 0 | Status: SHIPPED | OUTPATIENT
Start: 2022-08-26

## 2022-08-26 RX ORDER — AMLODIPINE BESYLATE 10 MG/1
10 TABLET ORAL DAILY
Qty: 90 TABLET | Refills: 0 | Status: SHIPPED | OUTPATIENT
Start: 2022-08-26

## 2022-09-01 ENCOUNTER — TELEPHONE (OUTPATIENT)
Dept: AUDIOLOGY | Age: 87
End: 2022-09-01

## 2022-09-01 ENCOUNTER — PROCEDURE VISIT (OUTPATIENT)
Dept: AUDIOLOGY | Age: 87
End: 2022-09-01

## 2022-09-01 DIAGNOSIS — E78.2 MIXED HYPERLIPIDEMIA: ICD-10-CM

## 2022-09-01 DIAGNOSIS — H93.8X3 SENSATION OF FULLNESS IN BOTH EARS: Primary | ICD-10-CM

## 2022-09-01 PROCEDURE — 99024 POSTOP FOLLOW-UP VISIT: CPT | Performed by: AUDIOLOGIST

## 2022-09-01 RX ORDER — LISINOPRIL 20 MG/1
20 TABLET ORAL DAILY
Qty: 90 TABLET | Refills: 0 | Status: SHIPPED | OUTPATIENT
Start: 2022-09-01

## 2022-09-01 RX ORDER — SIMVASTATIN 20 MG
20 TABLET ORAL DAILY
Qty: 90 TABLET | Refills: 0 | Status: SHIPPED | OUTPATIENT
Start: 2022-09-01

## 2022-09-01 NOTE — PROGRESS NOTES
Patient scheduled for hearing evaluation/hearing aid consult. Otoscopic exam revealed excessive cerumen, left ear worse. Explained to patient that she would need to see ENT to have cerumen removed. Patient was given option to return to her regular ENT or be seen at [facility]. Patient elected to be seen at [facility], and approved being seen by the ENT NP. Message forwarded to ENT Clinical staff to schedule appointment.

## 2022-09-01 NOTE — TELEPHONE ENCOUNTER
Patient scheduled for hearing evaluation/hearing aid consult. Otoscopic exam revealed excessive cerumen, left ear worse. Explained to patient that she would need to see ENT to have cerumen removed. Patient was given option to return to her regular ENT or be seen at [facility]. Patient elected to be seen at [facility], and approved being seen by the ENT NP. Message forwarded to ENT Clinical staff to schedule appointment.     Patient contact number:  [phone]

## 2022-09-26 ENCOUNTER — TELEPHONE (OUTPATIENT)
Dept: PRIMARY CARE CLINIC | Age: 87
End: 2022-09-26

## 2022-09-26 DIAGNOSIS — I10 ESSENTIAL HYPERTENSION: Primary | ICD-10-CM

## 2022-09-26 DIAGNOSIS — M81.0 OSTEOPOROSIS, UNSPECIFIED OSTEOPOROSIS TYPE, UNSPECIFIED PATHOLOGICAL FRACTURE PRESENCE: ICD-10-CM

## 2022-09-26 RX ORDER — DENOSUMAB 60 MG/ML
60 INJECTION SUBCUTANEOUS ONCE
Qty: 1 EACH | Refills: 0 | OUTPATIENT
Start: 2022-09-26 | End: 2022-09-27

## 2022-09-27 ENCOUNTER — PROCEDURE VISIT (OUTPATIENT)
Dept: AUDIOLOGY | Age: 87
End: 2022-09-27
Payer: MEDICARE

## 2022-09-27 ENCOUNTER — OFFICE VISIT (OUTPATIENT)
Dept: ENT CLINIC | Age: 87
End: 2022-09-27
Payer: MEDICARE

## 2022-09-27 ENCOUNTER — HOSPITAL ENCOUNTER (OUTPATIENT)
Dept: INFUSION THERAPY | Age: 87
Setting detail: INFUSION SERIES
End: 2022-09-27

## 2022-09-27 VITALS
WEIGHT: 205 LBS | HEIGHT: 64 IN | HEART RATE: 81 BPM | DIASTOLIC BLOOD PRESSURE: 71 MMHG | BODY MASS INDEX: 35 KG/M2 | SYSTOLIC BLOOD PRESSURE: 159 MMHG

## 2022-09-27 DIAGNOSIS — H61.23 BILATERAL IMPACTED CERUMEN: ICD-10-CM

## 2022-09-27 DIAGNOSIS — M81.0 OSTEOPOROSIS, UNSPECIFIED OSTEOPOROSIS TYPE, UNSPECIFIED PATHOLOGICAL FRACTURE PRESENCE: ICD-10-CM

## 2022-09-27 DIAGNOSIS — E66.01 SEVERE OBESITY (BMI 35.0-39.9) WITH COMORBIDITY (HCC): ICD-10-CM

## 2022-09-27 DIAGNOSIS — H91.8X9 ASYMMETRICAL HEARING LOSS: Primary | ICD-10-CM

## 2022-09-27 DIAGNOSIS — H91.93 BILATERAL HEARING LOSS, UNSPECIFIED HEARING LOSS TYPE: Primary | ICD-10-CM

## 2022-09-27 PROCEDURE — 99204 OFFICE O/P NEW MOD 45 MIN: CPT | Performed by: OTOLARYNGOLOGY

## 2022-09-27 PROCEDURE — 92567 TYMPANOMETRY: CPT | Performed by: AUDIOLOGIST

## 2022-09-27 PROCEDURE — 69210 REMOVE IMPACTED EAR WAX UNI: CPT | Performed by: OTOLARYNGOLOGY

## 2022-09-27 PROCEDURE — 92557 COMPREHENSIVE HEARING TEST: CPT | Performed by: AUDIOLOGIST

## 2022-09-27 RX ORDER — DENOSUMAB 60 MG/ML
60 INJECTION SUBCUTANEOUS ONCE
Qty: 1 EACH | Refills: 0 | Status: SHIPPED | OUTPATIENT
Start: 2022-09-27 | End: 2022-10-19 | Stop reason: SDUPTHER

## 2022-09-27 NOTE — PROGRESS NOTES
This patient was referred for audiometric and tympanometric testing by Dr. Luis M Cody due to hearing loss. Patient denied any pain, pressure, tinnitus, drainage or dizziness. 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 Reliability was good. Speech reception thresholds were in good agreement with the pure tone averages, bilaterally. Speech discrimination scores were excellent, bilaterally. Asymmetries noted across frequencies. Tympanometry revealed normal middle ear peak pressure and compliance, in the right ear and flat tympanogram in the left ear. The results were reviewed with the patient. Recommendations for follow up will be made pending physician consult. DORON Vallecillo.   Audiology Intern (4th Year)

## 2022-09-27 NOTE — PROGRESS NOTES
The benefits and limitations of amplification were discussed with the patient. It is recommended that the patient be fit with binaural hearing aids. The patient would like to order binaural CHANTELL hearing aids. Order placed and confirmation received. Patient will be contacted when hearing aids arrive at department. I have discussed the case, including pertinent history and exam findings with the audiology extern. I have seen and examined the patient and the key elements of the encounter have been performed by me. I agree with the assessment, plan and orders as documented by the audiology extern.    Stephany Longoria CCC/A  Audiologist  [id number]  NPI#:  [id number]

## 2022-10-14 PROBLEM — N18.30 CHRONIC RENAL DISEASE, STAGE III (HCC): Status: ACTIVE | Noted: 2022-10-14

## 2022-10-14 ASSESSMENT — ENCOUNTER SYMPTOMS
SHORTNESS OF BREATH: 0
VOMITING: 0
COUGH: 0

## 2022-10-14 NOTE — PROGRESS NOTES
[facility] Otolaryngology  Dr. Greer Herrera D.O. Ms.Ed. New Consult       Patient Name:  Gita Tillman  :  1932     CHIEF C/O:    Chief Complaint   Patient presents with    Follow-up     Pt states bilateral ears are full of wax. HISTORY OBTAINED FROM:  patient    HISTORY OF PRESENT ILLNESS:       Jose Dillon is a 80y.o. year old female, here today for history of bilateral cerumen impactions. Patient denies any current complaints of room spinning vertigo, no complaints of new hearing loss other than fullness bilaterally. Known history of progressive stable hearing loss. Not currently wearing any hearing aids.       Past Medical History:   Diagnosis Date    Allergy     Arthritis     Chest pain 2014    lexiscan stress test    Chronic back pain     Depression     GERD (gastroesophageal reflux disease)     Headache(784.0)     History of blood transfusion     Hyperlipidemia     Hypertension     Hypothyroid     Incontinence of urine     pt has implant in bladder medtronic model IM 3058    Memory changes     Pneumonia     Thyroid disease     Urinary tract infection     Varicella     Vision abnormalities      Past Surgical History:   Procedure Laterality Date    APPENDECTOMY      BACK SURGERY      BLADDER SURGERY      CHOLECYSTECTOMY      CLOSED REDUCTION OF A JOINT  10-    right little finger    CYSTOSCOPY  [id number]    BOTOX INJECTION    CYSTOSCOPY  6/10/14    Bladder botox injection    CYSTOSCOPY  14    Botox injection bladder    CYSTOSCOPY  [id number]    BOTOX    CYSTOSCOPY  [id number]    BOTOX    CYSTOSCOPY N/A 2019    CYSTOSCOPY  BOTOX 200 UNITS INJECTION--INTERSTIM FULL EXPLANT performed by Tonie Bergeron MD at [facility] CYSTOSCOPY N/A 2020    CYSTOSCOPY, BOTOX  INJECTION 300 UNITS performed by Tonie Bergeron MD at [facility] ECHO COMPL W DOP COLOR FLOW  2012         FRACTURE SURGERY      HEMORRHOID SURGERY  40 yrs ago    HYSTERECTOMY (CERVIX STATUS UNKNOWN)      KNEE ARTHROSCOPY  1/19/12    left     LUMBAR DRAIN IMPLANTATION  6/27/2012    OTHER SURGICAL HISTORY  07/23/2012    repair lumbar meningiocele with lumbar drain    OTHER SURGICAL HISTORY N/A 10/04/2016    sacral nerve stimulation    TONSILLECTOMY      UPPER GASTROINTESTINAL ENDOSCOPY N/A 9/20/2019    EGD ESOPHAGOGASTRODUODENOSCOPY performed by Nella Jaramillo DO at [place] [address] N/A 11/25/2020    EGD BIOPSY performed by Aries Lawler MD at [address] [place] [address]  2008    left       Current Outpatient Medications:     denosumab (PROLIA) 60 MG/ML SOSY SC injection, Inject 1 mL into the skin once for 1 dose, Disp: 1 each, Rfl: 0    simvastatin (ZOCOR) 20 MG tablet, Take 1 tablet by mouth daily, Disp: 90 tablet, Rfl: 0    lisinopril (PRINIVIL;ZESTRIL) 20 MG tablet, Take 1 tablet by mouth daily, Disp: 90 tablet, Rfl: 0    amLODIPine (NORVASC) 10 MG tablet, Take 1 tablet by mouth daily, Disp: 90 tablet, Rfl: 0    levothyroxine (SYNTHROID) 50 MCG tablet, Take 1 tablet by mouth Daily, Disp: 90 tablet, Rfl: 0    pantoprazole (PROTONIX) 40 MG tablet, Take 1 tablet by mouth daily, Disp: 90 tablet, Rfl: 0    albuterol sulfate HFA (VENTOLIN HFA) 108 (90 Base) MCG/ACT inhaler, Inhale 2 puffs into the lungs 4 times daily as needed for Wheezing, Disp: 1 each, Rfl: 2    clobetasol (TEMOVATE) 0.05 % cream, , Disp: , Rfl:     miconazole (ZEASORB-AF) 2 % powder, Apply topically 2 times daily. , Disp: 100 g, Rfl: 0    clotrimazole-betamethasone (LOTRISONE) 1-0.05 % cream, Apply topically 2 times daily. , Disp: 45 g, Rfl: 1    ibuprofen (ADVIL;MOTRIN) 800 MG tablet, Take 1 tablet by mouth every 8 hours as needed for Pain, Disp: 25 tablet, Rfl: 0    VITAMIN E PO, Take 1 capsule by mouth daily, Disp: , Rfl:     aspirin 81 MG EC tablet, Take 81 mg by mouth daily Last dose  1 week ago, Disp: , Rfl:   Morphine sulfate, Vitamin k [phytonadione], and Penicillins  Social History     Tobacco Use    Smoking status: Never    Smokeless tobacco: Never   Vaping Use    Vaping Use: Never used   Substance Use Topics    Alcohol use: Yes     Alcohol/week: 1.0 standard drink     Types: 1 Glasses of wine per week     Comment: daily    Drug use: No     Family History   Problem Relation Age of Onset    Heart Disease Mother     Cancer Father     Cancer Sister        Review of Systems   Constitutional:  Negative for chills and fever. HENT:  Negative for ear discharge and hearing loss. Respiratory:  Negative for cough and shortness of breath. Cardiovascular:  Negative for chest pain and palpitations. Gastrointestinal:  Negative for vomiting. Skin:  Negative for rash. Allergic/Immunologic: Negative for environmental allergies. Neurological:  Negative for dizziness and headaches. Hematological:  Does not bruise/bleed easily. All other systems reviewed and are negative. BP (!) 159/71 (Site: Left Upper Arm, Position: Sitting, Cuff Size: Large Adult)   Pulse 81   Ht 5' 4\" (1.626 m)   Wt 205 lb (93 kg)   BMI 35.19 kg/m²   Physical Exam  Vitals and nursing note reviewed. Constitutional:       Appearance: She is well-developed. HENT:      Head: Normocephalic and atraumatic. Right Ear: Tympanic membrane and ear canal normal. There is impacted cerumen. Left Ear: Tympanic membrane and ear canal normal. There is impacted cerumen. Nose: No congestion or rhinorrhea. Eyes:      Pupils: Pupils are equal, round, and reactive to light. Neck:      Thyroid: No thyromegaly. Trachea: No tracheal deviation. Cardiovascular:      Rate and Rhythm: Normal rate. Pulmonary:      Effort: Pulmonary effort is normal. No respiratory distress. Musculoskeletal:         General: Normal range of motion. Cervical back: Normal range of motion. Lymphadenopathy:      Cervical: No cervical adenopathy.    Skin: General: Skin is warm. Findings: No erythema. Neurological:      Mental Status: She is alert. Cranial Nerves: No cranial nerve deficit. Procedure: Cerumen Impaction    Pre-op: Cerumen Impaction    Post Op: Same    Procedure: Cerumen Impaction removal    Surgeon: Romeml Roe    Procedure: Under microscopic assistance large cerumen impaction was removed using gentle suction and curette. TM intact B/L, Pt tolerated procedure well    Complications: None    Blood Loss Minimial    IMPRESSION/PLAN:  Patient seen exam for history of known chronic stable hearing loss with bilateral cerumen impactions removed today under microscopic assistance without complication. Follow-up in 6 months for repeat examination and ear cleaning. Dr. Chay Pinedo Otolaryngology/Facial Plastic Surgery Residency  Associate Clinical Professor:  Marycruz Murray, [facility]

## 2022-10-17 ENCOUNTER — TELEPHONE (OUTPATIENT)
Dept: PRIMARY CARE CLINIC | Age: 87
End: 2022-10-17

## 2022-10-17 DIAGNOSIS — M81.0 OSTEOPOROSIS, UNSPECIFIED OSTEOPOROSIS TYPE, UNSPECIFIED PATHOLOGICAL FRACTURE PRESENCE: ICD-10-CM

## 2022-10-17 DIAGNOSIS — I10 ESSENTIAL HYPERTENSION: Primary | ICD-10-CM

## 2022-10-19 RX ORDER — DENOSUMAB 60 MG/ML
60 INJECTION SUBCUTANEOUS ONCE
Qty: 1 EACH | Refills: 0 | Status: SHIPPED | OUTPATIENT
Start: 2022-10-19 | End: 2022-10-19

## 2022-10-26 ENCOUNTER — TELEPHONE (OUTPATIENT)
Dept: AUDIOLOGY | Age: 87
End: 2022-10-26

## 2022-10-26 NOTE — TELEPHONE ENCOUNTER
Left message at patient contact number to schedule hearing aid fitting.     Gavin Pablo CCC/ROCAEL  Audiologist  [id number]  NPI#:  [id number]

## 2022-11-03 ENCOUNTER — PROCEDURE VISIT (OUTPATIENT)
Dept: AUDIOLOGY | Age: 87
End: 2022-11-03

## 2022-11-03 DIAGNOSIS — H91.8X9 ASYMMETRICAL HEARING LOSS: Primary | ICD-10-CM

## 2022-11-03 PROCEDURE — 99024 POSTOP FOLLOW-UP VISIT: CPT | Performed by: AUDIOLOGIST

## 2022-11-03 NOTE — PROGRESS NOTES
Patient seen for hearing aid fitting. Patient and her daughter were instructed in the use and care of hearing aids and . Patient was able to insert the hearing aid (Right) with no issues. Left hearing aid (with 5mm dome) is very difficult to insert. Patient will try left hearing aid (better ear) and determine she is comfortable with the fit and sound. Patient scheduled to return in one week.     Dinh Arreola CCC/ROCAEL  Audiologist  [id number]  NPI#:  [id number]

## 2022-11-11 ENCOUNTER — PROCEDURE VISIT (OUTPATIENT)
Dept: AUDIOLOGY | Age: 87
End: 2022-11-11

## 2022-11-11 DIAGNOSIS — H90.3 SENSORINEURAL HEARING LOSS, BILATERAL: Primary | ICD-10-CM

## 2022-11-11 PROCEDURE — 99024 POSTOP FOLLOW-UP VISIT: CPT | Performed by: AUDIOLOGIST

## 2022-11-14 ENCOUNTER — HOSPITAL ENCOUNTER (OUTPATIENT)
Dept: INFUSION THERAPY | Age: 87
Setting detail: INFUSION SERIES
Discharge: HOME | End: 2022-11-14
Payer: MEDICARE

## 2022-11-14 VITALS
TEMPERATURE: 98.2 F | DIASTOLIC BLOOD PRESSURE: 83 MMHG | HEART RATE: 81 BPM | SYSTOLIC BLOOD PRESSURE: 167 MMHG | OXYGEN SATURATION: 97 % | RESPIRATION RATE: 22 BRPM

## 2022-11-14 DIAGNOSIS — M81.0 OSTEOPOROSIS, UNSPECIFIED OSTEOPOROSIS TYPE, UNSPECIFIED PATHOLOGICAL FRACTURE PRESENCE: Primary | ICD-10-CM

## 2022-11-14 LAB
ALBUMIN SERPL-MCNC: 4.1 G/DL (ref 3.5–5.2)
ALP BLD-CCNC: 57 U/L (ref 35–104)
ALT SERPL-CCNC: 20 U/L (ref 0–32)
ANION GAP SERPL CALCULATED.3IONS-SCNC: 12 MMOL/L (ref 7–16)
AST SERPL-CCNC: 22 U/L (ref 0–31)
BILIRUB SERPL-MCNC: 0.4 MG/DL (ref 0–1.2)
BUN BLDV-MCNC: 27 MG/DL (ref 6–23)
CALCIUM SERPL-MCNC: 9.6 MG/DL (ref 8.6–10.2)
CHLORIDE BLD-SCNC: 102 MMOL/L (ref 98–107)
CO2: 27 MMOL/L (ref 22–29)
CREAT SERPL-MCNC: 1.7 MG/DL (ref 0.5–1)
GFR SERPL CREATININE-BSD FRML MDRD: 28 ML/MIN/1.73
GLUCOSE BLD-MCNC: 101 MG/DL (ref 74–99)
POTASSIUM SERPL-SCNC: 4.8 MMOL/L (ref 3.5–5)
SODIUM BLD-SCNC: 141 MMOL/L (ref 132–146)
TOTAL PROTEIN: 6.6 G/DL (ref 6.4–8.3)

## 2022-11-14 PROCEDURE — 6360000002 HC RX W HCPCS: Performed by: INTERNAL MEDICINE

## 2022-11-14 PROCEDURE — 36415 COLL VENOUS BLD VENIPUNCTURE: CPT

## 2022-11-14 PROCEDURE — 80053 COMPREHEN METABOLIC PANEL: CPT

## 2022-11-14 PROCEDURE — 96372 THER/PROPH/DIAG INJ SC/IM: CPT

## 2022-11-14 RX ADMIN — DENOSUMAB 60 MG: 60 INJECTION SUBCUTANEOUS at 11:07

## 2022-11-22 NOTE — PROGRESS NOTES
Patient is experiencing significant issues, with inserting/retention of hearing aids and has elected to return them for credit. She would like to try [address]  hearing aids. Ear mold impressions were taken without incident. Will contact patient when hearing aids received, to schedule a [place] fitting.     Gabriela García CCC/ROCAEL  Audiologist  [id number]  NPI#:  [id number]

## 2022-12-05 DIAGNOSIS — E78.2 MIXED HYPERLIPIDEMIA: ICD-10-CM

## 2022-12-05 DIAGNOSIS — E03.9 ACQUIRED HYPOTHYROIDISM: ICD-10-CM

## 2022-12-05 RX ORDER — LEVOTHYROXINE SODIUM 0.05 MG/1
50 TABLET ORAL DAILY
Qty: 90 TABLET | Refills: 0 | Status: SHIPPED | OUTPATIENT
Start: 2022-12-05

## 2022-12-05 RX ORDER — AMLODIPINE BESYLATE 10 MG/1
10 TABLET ORAL DAILY
Qty: 90 TABLET | Refills: 0 | Status: SHIPPED | OUTPATIENT
Start: 2022-12-05

## 2022-12-05 RX ORDER — SIMVASTATIN 20 MG
20 TABLET ORAL DAILY
Qty: 90 TABLET | Refills: 0 | Status: SHIPPED | OUTPATIENT
Start: 2022-12-05

## 2022-12-05 RX ORDER — LISINOPRIL 20 MG/1
20 TABLET ORAL DAILY
Qty: 90 TABLET | Refills: 0 | Status: SHIPPED | OUTPATIENT
Start: 2022-12-05

## 2022-12-05 RX ORDER — PANTOPRAZOLE SODIUM 40 MG/1
40 TABLET, DELAYED RELEASE ORAL DAILY
Qty: 90 TABLET | Refills: 0 | Status: SHIPPED | OUTPATIENT
Start: 2022-12-05

## 2022-12-20 ENCOUNTER — OFFICE VISIT (OUTPATIENT)
Dept: ENT CLINIC | Age: 87
End: 2022-12-20
Payer: MEDICARE

## 2022-12-20 VITALS — WEIGHT: 200.4 LBS | HEIGHT: 64 IN | BODY MASS INDEX: 34.21 KG/M2

## 2022-12-20 DIAGNOSIS — H61.23 BILATERAL IMPACTED CERUMEN: ICD-10-CM

## 2022-12-20 DIAGNOSIS — H91.93 BILATERAL HEARING LOSS, UNSPECIFIED HEARING LOSS TYPE: Primary | ICD-10-CM

## 2022-12-20 PROCEDURE — 69210 REMOVE IMPACTED EAR WAX UNI: CPT | Performed by: OTOLARYNGOLOGY

## 2022-12-20 PROCEDURE — 99213 OFFICE O/P EST LOW 20 MIN: CPT | Performed by: OTOLARYNGOLOGY

## 2022-12-20 PROCEDURE — 1123F ACP DISCUSS/DSCN MKR DOCD: CPT | Performed by: OTOLARYNGOLOGY

## 2022-12-20 ASSESSMENT — ENCOUNTER SYMPTOMS
COUGH: 0
VOMITING: 0
SHORTNESS OF BREATH: 0

## 2022-12-20 NOTE — PROGRESS NOTES
[facility] Otolaryngology  Dr. Jenniffer Guadarrama D.O. Ms.Ed. Patient Name:  Asher Nice  :  1932     CHIEF C/O:    Chief Complaint   Patient presents with    Cerumen Impaction     3 mo cerumen check; left ear feel s clogged, when pulls on ear it opens it        HISTORY OBTAINED FROM:  patient    HISTORY OF PRESENT ILLNESS:       Mattie Hdz is a 80y.o. year old female, here today for history of bilateral cerumen impactions. Patient denies any current complaints of room spinning vertigo, no complaints of new hearing loss other than fullness bilaterally. Known history of progressive stable hearing loss. Not currently wearing any hearing aids.       Past Medical History:   Diagnosis Date    Allergy     Arthritis     Chest pain 2014    lexiscan stress test    Chronic back pain     Depression     GERD (gastroesophageal reflux disease)     Headache(784.0)     History of blood transfusion     Hyperlipidemia     Hypertension     Hypothyroid     Incontinence of urine     pt has implant in bladder medtronic model IM 3058    Memory changes     Pneumonia     Thyroid disease     Urinary tract infection     Varicella     Vision abnormalities      Past Surgical History:   Procedure Laterality Date    APPENDECTOMY      BACK SURGERY      BLADDER SURGERY      CHOLECYSTECTOMY      CLOSED REDUCTION OF A JOINT  10-    right little finger    CYSTOSCOPY  [id number]    BOTOX INJECTION    CYSTOSCOPY  6/10/14    Bladder botox injection    CYSTOSCOPY  14    Botox injection bladder    CYSTOSCOPY  [id number]    BOTOX    CYSTOSCOPY  [id number]    BOTOX    CYSTOSCOPY N/A 2019    CYSTOSCOPY  BOTOX 200 UNITS INJECTION--INTERSTIM FULL EXPLANT performed by Milena Vega MD at [address] [facility] N/A 2020    CYSTOSCOPY, BOTOX  INJECTION 300 UNITS performed by Milena Vega MD at [address]    ECHO COMPL W DOP COLOR FLOW  2012         FRACTURE SURGERY      HEMORRHOID SURGERY  40 yrs ago HYSTERECTOMY (CERVIX STATUS UNKNOWN)      KNEE ARTHROSCOPY  1/19/12    left     LUMBAR DRAIN IMPLANTATION  6/27/2012    OTHER SURGICAL HISTORY  07/23/2012    repair lumbar meningiocele with lumbar drain    OTHER SURGICAL HISTORY N/A 10/04/2016    sacral nerve stimulation    TONSILLECTOMY      UPPER GASTROINTESTINAL ENDOSCOPY N/A 9/20/2019    EGD ESOPHAGOGASTRODUODENOSCOPY performed by Jae Navarro DO at [address] [facility] 11/25/2020    EGD BIOPSY performed by Garrett Ayers MD at [address] [facility]  2008    left       Current Outpatient Medications:     simvastatin (ZOCOR) 20 MG tablet, Take 1 tablet by mouth daily, Disp: 90 tablet, Rfl: 0    pantoprazole (PROTONIX) 40 MG tablet, Take 1 tablet by mouth daily, Disp: 90 tablet, Rfl: 0    lisinopril (PRINIVIL;ZESTRIL) 20 MG tablet, Take 1 tablet by mouth daily, Disp: 90 tablet, Rfl: 0    levothyroxine (SYNTHROID) 50 MCG tablet, Take 1 tablet by mouth Daily, Disp: 90 tablet, Rfl: 0    amLODIPine (NORVASC) 10 MG tablet, Take 1 tablet by mouth daily, Disp: 90 tablet, Rfl: 0    albuterol sulfate HFA (VENTOLIN HFA) 108 (90 Base) MCG/ACT inhaler, Inhale 2 puffs into the lungs 4 times daily as needed for Wheezing, Disp: 1 each, Rfl: 2    clobetasol (TEMOVATE) 0.05 % cream, , Disp: , Rfl:     miconazole (ZEASORB-AF) 2 % powder, Apply topically 2 times daily. , Disp: 100 g, Rfl: 0    clotrimazole-betamethasone (LOTRISONE) 1-0.05 % cream, Apply topically 2 times daily. , Disp: 45 g, Rfl: 1    ibuprofen (ADVIL;MOTRIN) 800 MG tablet, Take 1 tablet by mouth every 8 hours as needed for Pain, Disp: 25 tablet, Rfl: 0    VITAMIN E PO, Take 1 capsule by mouth daily, Disp: , Rfl:     aspirin 81 MG EC tablet, Take 81 mg by mouth daily Last dose  1 week ago, Disp: , Rfl:     denosumab (PROLIA) 60 MG/ML SOSY SC injection, Inject 1 mL into the skin once for 1 dose, Disp: 1 each, Rfl: 0  Morphine sulfate, Vitamin k [phytonadione], and Penicillins  Social History     Tobacco Use    Smoking status: Never    Smokeless tobacco: Never   Vaping Use    Vaping Use: Never used   Substance Use Topics    Alcohol use: Yes     Alcohol/week: 1.0 standard drink     Types: 1 Glasses of wine per week     Comment: daily    Drug use: No     Family History   Problem Relation Age of Onset    Heart Disease Mother     Cancer Father     Cancer Sister        Review of Systems   Constitutional:  Negative for chills and fever. HENT:  Negative for ear discharge and hearing loss. Respiratory:  Negative for cough and shortness of breath. Cardiovascular:  Negative for chest pain and palpitations. Gastrointestinal:  Negative for vomiting. Skin:  Negative for rash. Allergic/Immunologic: Negative for environmental allergies. Neurological:  Negative for dizziness and headaches. Hematological:  Does not bruise/bleed easily. All other systems reviewed and are negative. Ht 5' 4\" (1.626 m)   Wt 200 lb 6.4 oz (90.9 kg)   BMI 34.40 kg/m²   Physical Exam  Vitals and nursing note reviewed. Constitutional:       Appearance: She is well-developed. HENT:      Head: Normocephalic and atraumatic. Right Ear: Tympanic membrane and ear canal normal. There is impacted cerumen. Left Ear: Tympanic membrane and ear canal normal. There is impacted cerumen. Ears:      Comments: Very narrow left ear canal, difficult visualization TM      Nose: No congestion or rhinorrhea. Eyes:      Pupils: Pupils are equal, round, and reactive to light. Neck:      Thyroid: No thyromegaly. Trachea: No tracheal deviation. Cardiovascular:      Rate and Rhythm: Normal rate. Pulmonary:      Effort: Pulmonary effort is normal. No respiratory distress. Musculoskeletal:         General: Normal range of motion. Cervical back: Normal range of motion. Lymphadenopathy:      Cervical: No cervical adenopathy. Skin:     General: Skin is warm.       Findings: No erythema. Neurological:      Mental Status: She is alert. Cranial Nerves: No cranial nerve deficit. Procedure: Cerumen Impaction    Pre-op: Cerumen Impaction    Post Op: Same    Procedure: Cerumen Impaction removal    Surgeon: Danna José    Procedure: Under microscopic assistance large cerumen impaction was removed using gentle suction and curette. TM intact B/L, Pt tolerated procedure well    Complications: None    Blood Loss Minimial    IMPRESSION/PLAN:  Patient seen exam for history of known chronic stable hearing loss with bilateral cerumen impactions removed today under microscopic assistance without complication. Follow-up in 6 months for repeat examination and ear cleaning.

## 2022-12-20 NOTE — PATIENT INSTRUCTIONS
Debrox ear drops: few drops into the left ear once a day for 1 week , let soak for about 5 minuets and then wipe outer portion of ear with cotton ball

## 2023-01-16 ENCOUNTER — PROCEDURE VISIT (OUTPATIENT)
Dept: AUDIOLOGY | Age: 88
End: 2023-01-16

## 2023-01-16 DIAGNOSIS — H90.3 SENSORINEURAL HEARING LOSS, BILATERAL: Primary | ICD-10-CM

## 2023-01-16 PROCEDURE — 99024 POSTOP FOLLOW-UP VISIT: CPT | Performed by: AUDIOLOGIST

## 2023-01-16 NOTE — PROGRESS NOTES
Patient seen for hearing aid fitting. Patient was unable to insert and remove the hearing aid, due to dexterity issues. Hearing aid did fall on the floor and was damaged. This hearing aid will be returned for credit and ITE will be ordered for this patient. In the meantime, it was recommended that patient's family purchase TV Ears, for use with the TV. Will contact patient when ITE hearing aid received from .     Denys Morocho CCC/ROCAEL  Audiologist  [id number]  NPI#:  [id number]

## 2023-03-06 DIAGNOSIS — E03.9 ACQUIRED HYPOTHYROIDISM: ICD-10-CM

## 2023-03-06 DIAGNOSIS — E78.2 MIXED HYPERLIPIDEMIA: ICD-10-CM

## 2023-03-06 RX ORDER — PANTOPRAZOLE SODIUM 40 MG/1
40 TABLET, DELAYED RELEASE ORAL DAILY
Qty: 90 TABLET | Refills: 0 | Status: SHIPPED | OUTPATIENT
Start: 2023-03-06

## 2023-03-06 RX ORDER — AMLODIPINE BESYLATE 10 MG/1
10 TABLET ORAL DAILY
Qty: 90 TABLET | Refills: 0 | Status: SHIPPED | OUTPATIENT
Start: 2023-03-06

## 2023-03-06 RX ORDER — LISINOPRIL 20 MG/1
20 TABLET ORAL DAILY
Qty: 90 TABLET | Refills: 0 | Status: SHIPPED | OUTPATIENT
Start: 2023-03-06

## 2023-03-06 RX ORDER — LEVOTHYROXINE SODIUM 0.05 MG/1
50 TABLET ORAL DAILY
Qty: 90 TABLET | Refills: 0 | Status: SHIPPED | OUTPATIENT
Start: 2023-03-06

## 2023-03-06 RX ORDER — SIMVASTATIN 20 MG
20 TABLET ORAL DAILY
Qty: 90 TABLET | Refills: 0 | Status: SHIPPED | OUTPATIENT
Start: 2023-03-06

## 2023-03-24 ENCOUNTER — OFFICE VISIT (OUTPATIENT)
Dept: ENT CLINIC | Age: 88
End: 2023-03-24
Payer: MEDICARE

## 2023-03-24 VITALS
WEIGHT: 202 LBS | SYSTOLIC BLOOD PRESSURE: 157 MMHG | BODY MASS INDEX: 34.49 KG/M2 | HEIGHT: 64 IN | HEART RATE: 71 BPM | DIASTOLIC BLOOD PRESSURE: 81 MMHG

## 2023-03-24 DIAGNOSIS — H61.23 BILATERAL IMPACTED CERUMEN: ICD-10-CM

## 2023-03-24 DIAGNOSIS — H91.93 BILATERAL HEARING LOSS, UNSPECIFIED HEARING LOSS TYPE: Primary | ICD-10-CM

## 2023-03-24 PROCEDURE — 69210 REMOVE IMPACTED EAR WAX UNI: CPT | Performed by: OTOLARYNGOLOGY

## 2023-03-24 PROCEDURE — 99213 OFFICE O/P EST LOW 20 MIN: CPT | Performed by: OTOLARYNGOLOGY

## 2023-03-24 PROCEDURE — 1123F ACP DISCUSS/DSCN MKR DOCD: CPT | Performed by: OTOLARYNGOLOGY

## 2023-03-24 ASSESSMENT — ENCOUNTER SYMPTOMS
COUGH: 0
TROUBLE SWALLOWING: 0
SINUS PAIN: 0
VOMITING: 0
VOICE CHANGE: 0
SINUS PRESSURE: 0
SHORTNESS OF BREATH: 0

## 2023-03-24 NOTE — PROGRESS NOTES
[facility] Otolaryngology  Dr. Elliott Severe. Lynette Yoder. Ms.Ed        Patient Name:  Chin Rodríguez  :  1932     CHIEF C/O:    Chief Complaint   Patient presents with    Follow-up     cerumen       HISTORY OBTAINED FROM:  patient    HISTORY OF PRESENT ILLNESS:       Arnoldo Callejas is a 80y.o. year old female, here today for follow up of routine cerumen impaction removal. Overall doing well. No new complaints or concerns.        Past Medical History:   Diagnosis Date    Allergy     Arthritis     Chest pain 2014    lexiscan stress test    Chronic back pain     Depression     GERD (gastroesophageal reflux disease)     Headache(784.0)     History of blood transfusion     Hyperlipidemia     Hypertension     Hypothyroid     Incontinence of urine     pt has implant in bladder medtronic model IM 3058    Memory changes     Pneumonia     Thyroid disease     Urinary tract infection     Varicella     Vision abnormalities      Past Surgical History:   Procedure Laterality Date    APPENDECTOMY      BACK SURGERY      BLADDER SURGERY      CHOLECYSTECTOMY      CLOSED REDUCTION OF A JOINT  10-    right little finger    CYSTOSCOPY  [id number]    BOTOX INJECTION    CYSTOSCOPY  6/10/14    Bladder botox injection    CYSTOSCOPY  14    Botox injection bladder    CYSTOSCOPY  [id number]    BOTOX    CYSTOSCOPY  [id number]    BOTOX    CYSTOSCOPY N/A 2019    CYSTOSCOPY  BOTOX 200 UNITS INJECTION--INTERSTIM FULL EXPLANT performed by Megan Amaya MD at [place] [address] 2020    CYSTOSCOPY, BOTOX  INJECTION 300 UNITS performed by Megan Amaya MD at [address] [place] [address]    ECHO COMPL W DOP COLOR FLOW  2012         FRACTURE SURGERY      HEMORRHOID SURGERY  40 yrs ago    HYSTERECTOMY (CERVIX STATUS UNKNOWN)      KNEE ARTHROSCOPY  12    left     LUMBAR DRAIN IMPLANTATION  2012    OTHER SURGICAL HISTORY  2012    repair lumbar meningiocele with lumbar drain    OTHER SURGICAL HISTORY N/A 10/04/2016    sacral

## 2023-05-23 ENCOUNTER — OFFICE VISIT (OUTPATIENT)
Dept: PRIMARY CARE CLINIC | Age: 88
End: 2023-05-23
Payer: MEDICARE

## 2023-05-23 VITALS
WEIGHT: 202.6 LBS | BODY MASS INDEX: 34.59 KG/M2 | DIASTOLIC BLOOD PRESSURE: 68 MMHG | SYSTOLIC BLOOD PRESSURE: 132 MMHG | HEIGHT: 64 IN | TEMPERATURE: 99.1 F | OXYGEN SATURATION: 96 % | HEART RATE: 96 BPM

## 2023-05-23 DIAGNOSIS — N39.42 URINARY INCONTINENCE WITHOUT SENSORY AWARENESS: ICD-10-CM

## 2023-05-23 DIAGNOSIS — R60.0 BILATERAL LEG EDEMA: ICD-10-CM

## 2023-05-23 DIAGNOSIS — M81.0 SENILE OSTEOPOROSIS: ICD-10-CM

## 2023-05-23 DIAGNOSIS — E03.9 ACQUIRED HYPOTHYROIDISM: ICD-10-CM

## 2023-05-23 DIAGNOSIS — M81.0 OSTEOPOROSIS, UNSPECIFIED OSTEOPOROSIS TYPE, UNSPECIFIED PATHOLOGICAL FRACTURE PRESENCE: ICD-10-CM

## 2023-05-23 DIAGNOSIS — E78.2 MIXED HYPERLIPIDEMIA: ICD-10-CM

## 2023-05-23 DIAGNOSIS — I10 ESSENTIAL HYPERTENSION: Primary | ICD-10-CM

## 2023-05-23 PROCEDURE — 1123F ACP DISCUSS/DSCN MKR DOCD: CPT | Performed by: INTERNAL MEDICINE

## 2023-05-23 PROCEDURE — 99215 OFFICE O/P EST HI 40 MIN: CPT | Performed by: INTERNAL MEDICINE

## 2023-05-23 RX ORDER — AMLODIPINE BESYLATE 5 MG/1
5 TABLET ORAL DAILY
Qty: 90 TABLET | Refills: 0 | Status: SHIPPED | OUTPATIENT
Start: 2023-05-23

## 2023-05-23 RX ORDER — LEVOTHYROXINE SODIUM 0.05 MG/1
50 TABLET ORAL DAILY
Qty: 90 TABLET | Refills: 0 | Status: SHIPPED | OUTPATIENT
Start: 2023-05-23

## 2023-05-23 RX ORDER — SIMVASTATIN 20 MG
20 TABLET ORAL DAILY
Qty: 90 TABLET | Refills: 0 | Status: SHIPPED | OUTPATIENT
Start: 2023-05-23

## 2023-05-23 RX ORDER — LISINOPRIL 20 MG/1
20 TABLET ORAL DAILY
Qty: 90 TABLET | Refills: 0 | Status: SHIPPED | OUTPATIENT
Start: 2023-05-23

## 2023-05-23 RX ORDER — PANTOPRAZOLE SODIUM 40 MG/1
40 TABLET, DELAYED RELEASE ORAL DAILY
Qty: 90 TABLET | Refills: 0 | Status: SHIPPED | OUTPATIENT
Start: 2023-05-23

## 2023-05-23 RX ORDER — DENOSUMAB 60 MG/ML
60 INJECTION SUBCUTANEOUS ONCE
Qty: 1 ML | Refills: 0 | Status: SHIPPED | OUTPATIENT
Start: 2023-05-23 | End: 2023-05-23

## 2023-05-23 SDOH — ECONOMIC STABILITY: INCOME INSECURITY: HOW HARD IS IT FOR YOU TO PAY FOR THE VERY BASICS LIKE FOOD, HOUSING, MEDICAL CARE, AND HEATING?: NOT VERY HARD

## 2023-05-23 SDOH — ECONOMIC STABILITY: FOOD INSECURITY: WITHIN THE PAST 12 MONTHS, THE FOOD YOU BOUGHT JUST DIDN'T LAST AND YOU DIDN'T HAVE MONEY TO GET MORE.: NEVER TRUE

## 2023-05-23 SDOH — ECONOMIC STABILITY: HOUSING INSECURITY
IN THE LAST 12 MONTHS, WAS THERE A TIME WHEN YOU DID NOT HAVE A STEADY PLACE TO SLEEP OR SLEPT IN A SHELTER (INCLUDING NOW)?: NO

## 2023-05-23 SDOH — ECONOMIC STABILITY: FOOD INSECURITY: WITHIN THE PAST 12 MONTHS, YOU WORRIED THAT YOUR FOOD WOULD RUN OUT BEFORE YOU GOT MONEY TO BUY MORE.: NEVER TRUE

## 2023-05-23 ASSESSMENT — PATIENT HEALTH QUESTIONNAIRE - PHQ9
6. FEELING BAD ABOUT YOURSELF - OR THAT YOU ARE A FAILURE OR HAVE LET YOURSELF OR YOUR FAMILY DOWN: 0
10. IF YOU CHECKED OFF ANY PROBLEMS, HOW DIFFICULT HAVE THESE PROBLEMS MADE IT FOR YOU TO DO YOUR WORK, TAKE CARE OF THINGS AT HOME, OR GET ALONG WITH OTHER PEOPLE: 0
3. TROUBLE FALLING OR STAYING ASLEEP: 0
7. TROUBLE CONCENTRATING ON THINGS, SUCH AS READING THE NEWSPAPER OR WATCHING TELEVISION: 0
1. LITTLE INTEREST OR PLEASURE IN DOING THINGS: 0
5. POOR APPETITE OR OVEREATING: 0
SUM OF ALL RESPONSES TO PHQ QUESTIONS 1-9: 0
4. FEELING TIRED OR HAVING LITTLE ENERGY: 0
SUM OF ALL RESPONSES TO PHQ QUESTIONS 1-9: 0
SUM OF ALL RESPONSES TO PHQ9 QUESTIONS 1 & 2: 0
SUM OF ALL RESPONSES TO PHQ QUESTIONS 1-9: 0
9. THOUGHTS THAT YOU WOULD BE BETTER OFF DEAD, OR OF HURTING YOURSELF: 0
8. MOVING OR SPEAKING SO SLOWLY THAT OTHER PEOPLE COULD HAVE NOTICED. OR THE OPPOSITE, BEING SO FIGETY OR RESTLESS THAT YOU HAVE BEEN MOVING AROUND A LOT MORE THAN USUAL: 0
SUM OF ALL RESPONSES TO PHQ QUESTIONS 1-9: 0
2. FEELING DOWN, DEPRESSED OR HOPELESS: 0

## 2023-05-23 NOTE — PROGRESS NOTES
Chief Complaint   Patient presents with    Hypertension     Medication refills       HPI:  Patient is here for follow-up  Patient last appointment in the office was October 2021 she stated she had to get an order for Prolia injection patient last DEXA scan was in 2016 . she feels well only complaint of left ankle swelling. She is currently on amlodipine 10 mg tablet daily for hypertension  Denies shortness of breath chest pain  Had no lab work recently. Patient complains of lower urinary incontinence after depends  Patient requested refills on medications    Past Medical History, Surgical History, and Family History has been reviewed and updated.     Review of Systems:  Constitutional:  No fever, no fatigue, no chills, no headaches, no weight change  Dermatology:  No rash, no mole, no dry or sensitive skin  ENT:  No cough, no sore throat, no sinus pain, no runny nose, no ear pain  Cardiology:  No chest pain, no palpitations, no leg edema, no shortness of breath, no PND  Gastroenterology:  No dysphagia, no abdominal pain, no nausea, no vomiting, no constipation, no diarrhea, no heartburn  Musculoskeletal:  No joint pain, no leg cramps, no back pain, no muscle aches  Respiratory:  No shortness of breath, no orthopnea, no wheezing, no ASHLEY, no hemoptysis  Urology:  No blood in the urine, no urinary frequency, no urinary incontinence, no urinary urgency, no nocturia, no dysuria    Vitals:    05/23/23 1339   BP: 132/68   Site: Right Upper Arm   Position: Sitting   Cuff Size: Medium Adult   Pulse: 96   Temp: 99.1 °F (37.3 °C)   TempSrc: Temporal   SpO2: 96%   Weight: 202 lb 9.6 oz (91.9 kg)   Height: 5' 4\" (1.626 m)       General:  Patient alert and oriented x 3, NAD, pleasant  HEENT:  Atraumatic, normocephalic, PERRLA, EOMI, clear conjunctiva, TMs clear, nose-clear, throat - no erythema  Neck:  Supple, no goiter, no carotid bruits, no LAD  Lungs:  CTA   Heart:  RRR, no murmurs, gallops or rubs  Abdomen:  Soft/nt/nd, +

## 2023-05-23 NOTE — PROGRESS NOTES
Chief Complaint:   Chief Complaint   Patient presents with    Hip Pain     left hip bursitis f/u wants a cortisone injection. [address] follows up for left nipple pain. This is lateral in the proximal thigh. She had gone to physical therapy 1 occasion but did not go back because of the weather and also because of the $40 co-pay which she does not have the luxury of spending on a regular basis  The hip is painful at times at rest and more often with activities. Allergies; medications; past medical, surgical, family, and social history; and problem list have been reviewed today and updated as indicated in this encounter seen below. Exam: There is tenderness to palpation mostly over the greater trochanter of the left proximal thigh. There is no discoloration. There is no mass-effect. She has fairly good muscle control. Gait is slow and a little relatively had a slow pace. Radiographs: None    ASSESSMENT:    Candace Islas was seen today for hip pain. Diagnoses and all orders for this visit:    Trochanteric bursitis, left hip  -     ID ARTHROCENTESIS ASPIR&/INJ MAJOR JT/BURSA W/O US    Other orders  -     triamcinolone acetonide (KENALOG-40) injection 40 mg        PLAN: We discussed treatment options and Mrs. schedule right to try an injection in the area. The risks and potential benefits were discussed and good understanding was indicated. Injectionof the left trochanteric bursa with  4 cc    1/4  % bupivicaine  and Kenalog   (triamcinalone)         40mgwas discussed with the patient. Discussion included but was not limited to potential risks and benefits. No contraindications to the procedure were noted. Understanding and agreement was indicated. The procedure was accomplished without incident using appropriate aseptic technique. follow up as needed    Return if symptoms worsen or fail to improve.        Current Outpatient Medications   Medication Sig Dispense Refill    amLODIPine (NORVASC) 10 MG tablet Take 1 tablet by mouth daily 90 tablet 1    lisinopril (PRINIVIL;ZESTRIL) 20 MG tablet Take 1 tablet by mouth daily 90 tablet 1    levothyroxine (SYNTHROID) 50 MCG tablet Take 1 tablet by mouth Daily 90 tablet 0    pantoprazole (PROTONIX) 40 MG tablet Take 1 tablet by mouth daily 90 tablet 0    simvastatin (ZOCOR) 20 MG tablet Take 1 tablet by mouth daily 90 tablet 0    clobetasol (TEMOVATE) 0.05 % cream       albuterol sulfate HFA (VENTOLIN HFA) 108 (90 Base) MCG/ACT inhaler Inhale 2 puffs into the lungs 4 times daily as needed for Wheezing 3 Inhaler 1    miconazole (ZEASORB-AF) 2 % powder Apply topically 2 times daily. 100 g 0    clotrimazole-betamethasone (LOTRISONE) 1-0.05 % cream Apply topically 2 times daily. 45 g 1    ibuprofen (ADVIL;MOTRIN) 800 MG tablet Take 1 tablet by mouth every 8 hours as needed for Pain 25 tablet 0    VITAMIN E PO Take 1 capsule by mouth daily      aspirin 81 MG EC tablet Take 81 mg by mouth daily Last dose  1 week ago      denosumab (PROLIA) 60 MG/ML SOSY SC injection Inject 1 mL into the skin once for 1 dose 1 mL 0     No current facility-administered medications for this visit.        Patient Active Problem List   Diagnosis    Phalanx, proximal fracture of R small finger     Degenerative arthritis of left knee    Medial meniscus tear    Synovitis of knee    Pseudomeningocele    Difficult airway for intubation    Acute respiratory failure (HCC)    Allergic reaction caused by a drug-morphine    Lumbosacral spondylosis without myelopathy    Postlaminectomy syndrome, lumbar region    Chronic back pain    Depression    Hypothyroid    Respiratory distress    Senile osteoporosis    RUQ abdominal pain    Gastric ulcer due to nonsteroidal anti-inflammatory drug (NSAID)    Osteoporosis    Essential hypertension       Past Medical History:   Diagnosis Date    Allergy     Arthritis     Chest pain 6-    lexiscan stress test    Chronic back pain     Depression     GERD (gastroesophageal reflux disease)     Headache(784.0)     History of blood transfusion     Hyperlipidemia     Hypertension     Hypothyroid     Incontinence of urine     pt has implant in bladder medtronic model IM [id number]    Memory changes     Pneumonia     Thyroid disease     Urinary tract infection     Varicella     Vision abnormalities        Past Surgical History:   Procedure Laterality Date    APPENDECTOMY      BACK SURGERY      BLADDER SURGERY      CHOLECYSTECTOMY      CLOSED REDUCTION OF A JOINT  10-    right little finger    CYSTOSCOPY  [id number]    BOTOX INJECTION    CYSTOSCOPY  6/10/14    Bladder botox injection    CYSTOSCOPY  12/9/14    Botox injection bladder    CYSTOSCOPY  [id number]    BOTOX    CYSTOSCOPY  [id number]    BOTOX    CYSTOSCOPY N/A 11/26/2019    CYSTOSCOPY  BOTOX 200 UNITS INJECTION--INTERSTIM FULL EXPLANT performed by Maya Elmore MD at [facility] 7/30/2020    CYSTOSCOPY, BOTOX  INJECTION 300 UNITS performed by Maya Elmore MD at [place] [address] ECHO COMPL W DOP COLOR FLOW  7/20/2012         FRACTURE SURGERY      HEMORRHOID SURGERY  40 yrs ago    HYSTERECTOMY      KNEE ARTHROSCOPY  1/19/12    left     LUMBAR DRAIN IMPLANTATION  6/27/2012    OTHER SURGICAL HISTORY  07/23/2012    repair lumbar meningiocele with lumbar drain    OTHER SURGICAL HISTORY N/A 10/04/2016    sacral nerve stimulation    TONSILLECTOMY      UPPER GASTROINTESTINAL ENDOSCOPY N/A 9/20/2019    EGD ESOPHAGOGASTRODUODENOSCOPY performed by Enrrique England DO at [address] [place] [address] N/A 11/25/2020    EGD BIOPSY performed by Heidy Carty MD at [address] [place] [address]  2008    left       Allergies   Allergen Reactions    Morphine Sulfate Anaphylaxis    Vitamin K [Phytonadione] Hives    Penicillins Hives and Rash       Social History     Socioeconomic History    Marital status:      Spouse name: None    Number of children: None    Years of education: None    Highest education level: None   Occupational History    None   Tobacco Use    Smoking status: Never Smoker    Smokeless tobacco: Never Used   Vaping Use    Vaping Use: Never used   Substance and Sexual Activity    Alcohol use: Yes     Alcohol/week: 1.0 standard drink     Types: 1 Glasses of wine per week     Comment: daily    Drug use: No    Sexual activity: None   Other Topics Concern    None   Social History Narrative    None     Social Determinants of Health     Financial Resource Strain: Low Risk     Difficulty of Paying Living Expenses: Not hard at all   Food Insecurity: No Food Insecurity    Worried About Running Out of Food in the Last Year: Never true    Aniket of Food in the Last Year: Never true   Transportation Needs:     Lack of Transportation (Medical): Not on file    Lack of Transportation (Non-Medical):  Not on file   Physical Activity:     Days of Exercise per Week: Not on file    Minutes of Exercise per Session: Not on file   Stress:     Feeling of Stress : Not on file   Social Connections:     Frequency of Communication with Friends and Family: Not on file    Frequency of Social Gatherings with Friends and Family: Not on file    Attends Orthodoxy Services: Not on file    Active Member of [address] [place] [address] [employer] or Organizations: Not on file    Attends Club or Organization Meetings: Not on file    Marital Status: Not on file   Intimate Partner Violence:     Fear of Current or Ex-Partner: Not on file    Emotionally Abused: Not on file    Physically Abused: Not on file    Sexually Abused: Not on file   Housing Stability:     Unable to Pay for Housing in the Last Year: Not on file    Number of [place] in the Last Year: Not on file    Unstable Housing in the Last Year: Not on file       Review of Systems  As follows except as previously noted in HPI:  Constitutional: Negative for chills, diaphoresis, fatigue, fever and unexpected weight change. Respiratory: Negative for cough, shortness of breath and wheezing. Cardiovascular: Negative for chest pain and palpitations. Neurological: Negative for dizziness, syncope, cephalgia. GI / : negative  Musculoskeletal: see HPI       Objective:   Physical Exam   Constitutional: Oriented to person, place, and time. and appears well-developed and well-nourished. :   Head: Normocephalic and atraumatic. Eyes: EOM are normal.   Neck: Neck supple. Cardiovascular: Normal rate and regular rhythm. Pulmonary/Chest: Effort normal. No stridor. No respiratory distress, no wheezes. Abdominal:  No abnormal distension. Neurological: Alert and oriented to person, place, and time. Skin: Skin is warm and dry. Psychiatric: Normal mood and affect.  Behavior is normal. Thought content normal.    CHINA Neville DO    2/9/22  2:09 PM PPSV2: 20%  -Patient requires near total care  -Frequent repositioning for secretions management.  -Good skin care as per floor policy

## 2023-06-12 ENCOUNTER — HOSPITAL ENCOUNTER (OUTPATIENT)
Dept: INFUSION THERAPY | Age: 88
Setting detail: INFUSION SERIES
Discharge: HOME | End: 2023-06-12
Payer: MEDICARE

## 2023-06-12 VITALS
HEART RATE: 80 BPM | SYSTOLIC BLOOD PRESSURE: 139 MMHG | RESPIRATION RATE: 24 BRPM | DIASTOLIC BLOOD PRESSURE: 90 MMHG | TEMPERATURE: 97 F

## 2023-06-12 DIAGNOSIS — I10 ESSENTIAL HYPERTENSION: Primary | ICD-10-CM

## 2023-06-12 DIAGNOSIS — N39.42 URINARY INCONTINENCE WITHOUT SENSORY AWARENESS: ICD-10-CM

## 2023-06-12 DIAGNOSIS — E03.9 ACQUIRED HYPOTHYROIDISM: ICD-10-CM

## 2023-06-12 DIAGNOSIS — M81.0 OSTEOPOROSIS, UNSPECIFIED OSTEOPOROSIS TYPE, UNSPECIFIED PATHOLOGICAL FRACTURE PRESENCE: ICD-10-CM

## 2023-06-12 LAB
ALBUMIN SERPL-MCNC: 4 G/DL (ref 3.5–5.2)
ALP SERPL-CCNC: 52 U/L (ref 35–104)
ALT SERPL-CCNC: 12 U/L (ref 0–32)
ANION GAP SERPL CALCULATED.3IONS-SCNC: 10 MMOL/L (ref 7–16)
AST SERPL-CCNC: 14 U/L (ref 0–31)
BASOPHILS # BLD: 0.06 E9/L (ref 0–0.2)
BASOPHILS NFR BLD: 1 % (ref 0–2)
BILIRUB SERPL-MCNC: 0.6 MG/DL (ref 0–1.2)
BUN SERPL-MCNC: 32 MG/DL (ref 6–23)
CALCIUM SERPL-MCNC: 9.7 MG/DL (ref 8.6–10.2)
CHLORIDE SERPL-SCNC: 106 MMOL/L (ref 98–107)
CO2 SERPL-SCNC: 26 MMOL/L (ref 22–29)
CREAT SERPL-MCNC: 1.3 MG/DL (ref 0.5–1)
EOSINOPHIL # BLD: 0.33 E9/L (ref 0.05–0.5)
EOSINOPHIL NFR BLD: 5.4 % (ref 0–6)
ERYTHROCYTE [DISTWIDTH] IN BLOOD BY AUTOMATED COUNT: 14 FL (ref 11.5–15)
GLUCOSE SERPL-MCNC: 101 MG/DL (ref 74–99)
HCT VFR BLD AUTO: 42.5 % (ref 34–48)
HGB BLD-MCNC: 13.9 G/DL (ref 11.5–15.5)
IMM GRANULOCYTES # BLD: 0.04 E9/L
IMM GRANULOCYTES NFR BLD: 0.6 % (ref 0–5)
LYMPHOCYTES # BLD: 1.4 E9/L (ref 1.5–4)
LYMPHOCYTES NFR BLD: 22.7 % (ref 20–42)
MCH RBC QN AUTO: 28.9 PG (ref 26–35)
MCHC RBC AUTO-ENTMCNC: 32.7 % (ref 32–34.5)
MCV RBC AUTO: 88.4 FL (ref 80–99.9)
MONOCYTES # BLD: 0.78 E9/L (ref 0.1–0.95)
MONOCYTES NFR BLD: 12.7 % (ref 2–12)
NEUTROPHILS # BLD: 3.55 E9/L (ref 1.8–7.3)
NEUTS SEG NFR BLD: 57.6 % (ref 43–80)
PLATELET # BLD AUTO: 173 E9/L (ref 130–450)
PMV BLD AUTO: 10.8 FL (ref 7–12)
POTASSIUM SERPL-SCNC: 4.3 MMOL/L (ref 3.5–5)
PROT SERPL-MCNC: 6.6 G/DL (ref 6.4–8.3)
RBC # BLD AUTO: 4.81 E12/L (ref 3.5–5.5)
SODIUM SERPL-SCNC: 142 MMOL/L (ref 132–146)
TSH SERPL-MCNC: 1.76 UIU/ML (ref 0.27–4.2)
WBC # BLD: 6.2 E9/L (ref 4.5–11.5)

## 2023-06-12 PROCEDURE — 84443 ASSAY THYROID STIM HORMONE: CPT

## 2023-06-12 PROCEDURE — 96372 THER/PROPH/DIAG INJ SC/IM: CPT

## 2023-06-12 PROCEDURE — 6360000002 HC RX W HCPCS: Performed by: INTERNAL MEDICINE

## 2023-06-12 PROCEDURE — 80053 COMPREHEN METABOLIC PANEL: CPT

## 2023-06-12 PROCEDURE — 36415 COLL VENOUS BLD VENIPUNCTURE: CPT

## 2023-06-12 PROCEDURE — 85025 COMPLETE CBC W/AUTO DIFF WBC: CPT

## 2023-06-12 RX ADMIN — DENOSUMAB 60 MG: 60 INJECTION SUBCUTANEOUS at 11:12

## 2023-06-20 ENCOUNTER — OFFICE VISIT (OUTPATIENT)
Dept: PRIMARY CARE CLINIC | Age: 88
End: 2023-06-20
Payer: MEDICARE

## 2023-06-20 VITALS
OXYGEN SATURATION: 98 % | BODY MASS INDEX: 34.5 KG/M2 | TEMPERATURE: 96.9 F | HEART RATE: 78 BPM | WEIGHT: 201 LBS | SYSTOLIC BLOOD PRESSURE: 136 MMHG | DIASTOLIC BLOOD PRESSURE: 70 MMHG

## 2023-06-20 DIAGNOSIS — H05.011 ORBITAL CELLULITIS ON RIGHT: Primary | ICD-10-CM

## 2023-06-20 DIAGNOSIS — I10 ESSENTIAL HYPERTENSION: ICD-10-CM

## 2023-06-20 DIAGNOSIS — E03.9 ACQUIRED HYPOTHYROIDISM: ICD-10-CM

## 2023-06-20 PROCEDURE — 1123F ACP DISCUSS/DSCN MKR DOCD: CPT | Performed by: INTERNAL MEDICINE

## 2023-06-20 PROCEDURE — 99213 OFFICE O/P EST LOW 20 MIN: CPT | Performed by: INTERNAL MEDICINE

## 2023-06-20 RX ORDER — CLINDAMYCIN PHOSPHATE 10 MG/G
GEL TOPICAL
Qty: 45 G | Refills: 0 | Status: SHIPPED | OUTPATIENT
Start: 2023-06-20 | End: 2023-06-27

## 2023-06-20 RX ORDER — DOXYCYCLINE HYCLATE 100 MG
100 TABLET ORAL 2 TIMES DAILY
Qty: 14 TABLET | Refills: 0 | Status: SHIPPED | OUTPATIENT
Start: 2023-06-20 | End: 2023-06-27

## 2023-06-20 NOTE — PROGRESS NOTES
Chief Complaint   Patient presents with    Rash     Rash on face        HPI:  Patient is here for acute visit  Patient complains of dysuria right side of the face including below right movement and right cheek, also swelling in the right upper eyelid with local skin area started last couple days. Denies any insect bite,or trauma    Lab work reviewed including TSH CMP CBC within normal  .    Past Medical History, Surgical History, and Family History has been reviewed and updated. Review of Systems:  Constitutional:  No fever, no fatigue, no chills, no headaches, no weight change  Dermatology:  No rash, no mole, no dry or sensitive skin  ENT:  No cough, no sore throat, no sinus pain, no runny nose, no ear pain  Cardiology:  No chest pain, no palpitations, no leg edema, no shortness of breath, no PND  Gastroenterology:  No dysphagia, no abdominal pain, no nausea, no vomiting, no constipation, no diarrhea, no heartburn  Musculoskeletal:  No joint pain, no leg cramps, no back pain, no muscle aches  Respiratory:  No shortness of breath, no orthopnea, no wheezing, no ASHLEY, no hemoptysis  Urology:  No blood in the urine, no urinary frequency, no urinary incontinence, no urinary urgency, no nocturia, no dysuria    Vitals:    06/20/23 1440   BP: 136/70   Site: Left Upper Arm   Position: Sitting   Cuff Size: Large Adult   Pulse: 78   Temp: 96.9 °F (36.1 °C)   SpO2: 98%   Weight: 201 lb (91.2 kg)       General:  Patient alert and oriented x 3, NAD, pleasant  HEENT:  Atraumatic, normocephalic, PERRLA, EOMI, clear conjunctiva,   Right upper eyelid erythematous edematous with open skin area  Right lower lid erythematous.   Very elevated on the right cheek  TMs clear, nose-clear, throat - no erythema  Neck:  Supple, no goiter, no carotid bruits, no LAD  Lungs:  CTA   Heart:  RRR, no murmurs, gallops or rubs  Abdomen:  Soft/nt/nd, + bowel sounds  Lymph node examination: unremarkable  Neurological exam : unremarkable  Extremities:

## 2023-07-18 ENCOUNTER — OFFICE VISIT (OUTPATIENT)
Dept: PRIMARY CARE CLINIC | Age: 88
End: 2023-07-18
Payer: MEDICARE

## 2023-07-18 VITALS
DIASTOLIC BLOOD PRESSURE: 88 MMHG | BODY MASS INDEX: 34.19 KG/M2 | WEIGHT: 200.3 LBS | OXYGEN SATURATION: 92 % | HEIGHT: 64 IN | SYSTOLIC BLOOD PRESSURE: 170 MMHG | HEART RATE: 79 BPM | TEMPERATURE: 97.4 F

## 2023-07-18 DIAGNOSIS — I10 ESSENTIAL HYPERTENSION: Primary | ICD-10-CM

## 2023-07-18 DIAGNOSIS — H05.011 ORBITAL CELLULITIS ON RIGHT: ICD-10-CM

## 2023-07-18 DIAGNOSIS — T78.40XA RASH DUE TO ALLERGY: ICD-10-CM

## 2023-07-18 DIAGNOSIS — R21 RASH DUE TO ALLERGY: ICD-10-CM

## 2023-07-18 PROCEDURE — 99213 OFFICE O/P EST LOW 20 MIN: CPT | Performed by: INTERNAL MEDICINE

## 2023-07-18 PROCEDURE — 1123F ACP DISCUSS/DSCN MKR DOCD: CPT | Performed by: INTERNAL MEDICINE

## 2023-07-18 RX ORDER — AMLODIPINE BESYLATE 2.5 MG/1
2.5 TABLET ORAL DAILY
Qty: 90 TABLET | Refills: 0 | Status: SHIPPED | OUTPATIENT
Start: 2023-07-18

## 2023-07-18 RX ORDER — CETIRIZINE HYDROCHLORIDE 10 MG/1
5 TABLET ORAL DAILY
Qty: 30 TABLET | Refills: 1 | Status: SHIPPED | OUTPATIENT
Start: 2023-07-18

## 2023-07-18 RX ORDER — TRIAMCINOLONE ACETONIDE 0.25 MG/G
OINTMENT TOPICAL
Qty: 45 G | Refills: 1 | Status: SHIPPED | OUTPATIENT
Start: 2023-07-18 | End: 2023-07-25

## 2023-09-06 DIAGNOSIS — E03.9 ACQUIRED HYPOTHYROIDISM: ICD-10-CM

## 2023-09-06 DIAGNOSIS — T78.40XA RASH DUE TO ALLERGY: ICD-10-CM

## 2023-09-06 DIAGNOSIS — E78.2 MIXED HYPERLIPIDEMIA: ICD-10-CM

## 2023-09-06 DIAGNOSIS — R21 RASH DUE TO ALLERGY: ICD-10-CM

## 2023-09-06 DIAGNOSIS — I10 ESSENTIAL HYPERTENSION: ICD-10-CM

## 2023-09-06 RX ORDER — AMLODIPINE BESYLATE 2.5 MG/1
2.5 TABLET ORAL DAILY
Qty: 90 TABLET | Refills: 0 | Status: SHIPPED | OUTPATIENT
Start: 2023-09-06

## 2023-09-06 RX ORDER — IBUPROFEN 800 MG/1
800 TABLET ORAL EVERY 8 HOURS PRN
Qty: 25 TABLET | Refills: 0 | Status: SHIPPED | OUTPATIENT
Start: 2023-09-06

## 2023-09-06 RX ORDER — PANTOPRAZOLE SODIUM 40 MG/1
40 TABLET, DELAYED RELEASE ORAL DAILY
Qty: 90 TABLET | Refills: 0 | Status: SHIPPED | OUTPATIENT
Start: 2023-09-06

## 2023-09-06 RX ORDER — LISINOPRIL 20 MG/1
20 TABLET ORAL DAILY
Qty: 90 TABLET | Refills: 0 | Status: SHIPPED | OUTPATIENT
Start: 2023-09-06

## 2023-09-06 RX ORDER — SIMVASTATIN 20 MG
20 TABLET ORAL DAILY
Qty: 90 TABLET | Refills: 0 | Status: SHIPPED | OUTPATIENT
Start: 2023-09-06

## 2023-09-06 RX ORDER — LEVOTHYROXINE SODIUM 0.05 MG/1
50 TABLET ORAL DAILY
Qty: 90 TABLET | Refills: 0 | Status: SHIPPED | OUTPATIENT
Start: 2023-09-06

## 2023-09-06 RX ORDER — AMLODIPINE BESYLATE 5 MG/1
5 TABLET ORAL DAILY
Qty: 90 TABLET | Refills: 0 | Status: SHIPPED | OUTPATIENT
Start: 2023-09-06

## 2023-09-18 ENCOUNTER — TELEPHONE (OUTPATIENT)
Dept: PRIMARY CARE CLINIC | Age: 88
End: 2023-09-18

## 2023-09-18 NOTE — TELEPHONE ENCOUNTER
Pharmacy called not sure what script is correct for the Amlodipine. Is it the 2.5 or the 5 mg?  Please advise

## 2023-09-20 ENCOUNTER — TELEPHONE (OUTPATIENT)
Dept: PRIMARY CARE CLINIC | Age: 88
End: 2023-09-20

## 2023-09-20 NOTE — TELEPHONE ENCOUNTER
Patient called very upset, because the doctor sent two orders for amlodipine one for 5 mg and 2.5 mg. She said, it should only be 2.5 mg and why would the doctor send 5 mg. Patient would like a phone to know why this happened and what is she suppose to be taking.  She said, the doctor took her off the 5 mg because her feet were swelling

## 2023-09-21 ENCOUNTER — TELEPHONE (OUTPATIENT)
Dept: PRIMARY CARE CLINIC | Age: 88
End: 2023-09-21

## 2023-09-22 NOTE — TELEPHONE ENCOUNTER
Pharmacy called requiring the dosage for the patient for the amlodipine. Is it suppose to be 7.5.  Patient told me that the doctor changed it to 2.5 because her feet were swollen

## 2023-09-29 DIAGNOSIS — I10 ESSENTIAL HYPERTENSION: ICD-10-CM

## 2023-10-04 RX ORDER — AMLODIPINE BESYLATE 2.5 MG/1
2.5 TABLET ORAL DAILY
Qty: 90 TABLET | Refills: 0 | Status: SHIPPED | OUTPATIENT
Start: 2023-10-04

## 2023-10-18 ENCOUNTER — HOSPITAL ENCOUNTER (OUTPATIENT)
Age: 88
Discharge: HOME | End: 2023-10-20
Payer: MEDICARE

## 2023-10-18 ENCOUNTER — HOSPITAL ENCOUNTER (OUTPATIENT)
Dept: GENERAL RADIOLOGY | Age: 88
Discharge: HOME | End: 2023-10-20
Payer: MEDICARE

## 2023-10-18 ENCOUNTER — OFFICE VISIT (OUTPATIENT)
Dept: PRIMARY CARE CLINIC | Age: 88
End: 2023-10-18
Payer: MEDICARE

## 2023-10-18 VITALS
TEMPERATURE: 99.9 F | WEIGHT: 200.8 LBS | HEIGHT: 64 IN | OXYGEN SATURATION: 93 % | BODY MASS INDEX: 34.28 KG/M2 | HEART RATE: 79 BPM | DIASTOLIC BLOOD PRESSURE: 70 MMHG | SYSTOLIC BLOOD PRESSURE: 118 MMHG

## 2023-10-18 DIAGNOSIS — H66.91 OTITIS OF RIGHT EAR: Primary | ICD-10-CM

## 2023-10-18 DIAGNOSIS — M26.641 ARTHRITIS OF RIGHT TEMPOROMANDIBULAR JOINT: ICD-10-CM

## 2023-10-18 PROCEDURE — 99213 OFFICE O/P EST LOW 20 MIN: CPT | Performed by: INTERNAL MEDICINE

## 2023-10-18 PROCEDURE — 70328 X-RAY EXAM OF JAW JOINT: CPT

## 2023-10-18 PROCEDURE — 1123F ACP DISCUSS/DSCN MKR DOCD: CPT | Performed by: INTERNAL MEDICINE

## 2023-10-18 RX ORDER — AZITHROMYCIN 250 MG/1
250 TABLET, FILM COATED ORAL SEE ADMIN INSTRUCTIONS
Qty: 6 TABLET | Refills: 0 | Status: SHIPPED | OUTPATIENT
Start: 2023-10-18 | End: 2023-10-23

## 2023-12-06 DIAGNOSIS — E78.2 MIXED HYPERLIPIDEMIA: ICD-10-CM

## 2023-12-06 DIAGNOSIS — J20.9 ACUTE BRONCHITIS, UNSPECIFIED ORGANISM: ICD-10-CM

## 2023-12-06 DIAGNOSIS — E03.9 ACQUIRED HYPOTHYROIDISM: ICD-10-CM

## 2023-12-06 DIAGNOSIS — R21 RASH DUE TO ALLERGY: ICD-10-CM

## 2023-12-06 DIAGNOSIS — I10 ESSENTIAL HYPERTENSION: ICD-10-CM

## 2023-12-06 DIAGNOSIS — T78.40XA RASH DUE TO ALLERGY: ICD-10-CM

## 2023-12-06 RX ORDER — ALBUTEROL SULFATE 90 UG/1
2 AEROSOL, METERED RESPIRATORY (INHALATION) 4 TIMES DAILY PRN
Qty: 1 EACH | Refills: 2 | Status: SHIPPED | OUTPATIENT
Start: 2023-12-06

## 2023-12-06 RX ORDER — CETIRIZINE HYDROCHLORIDE 10 MG/1
5 TABLET ORAL DAILY
Qty: 30 TABLET | Refills: 1 | Status: SHIPPED | OUTPATIENT
Start: 2023-12-06

## 2023-12-06 RX ORDER — PANTOPRAZOLE SODIUM 40 MG/1
40 TABLET, DELAYED RELEASE ORAL DAILY
Qty: 90 TABLET | Refills: 0 | Status: SHIPPED | OUTPATIENT
Start: 2023-12-06

## 2023-12-06 RX ORDER — LEVOTHYROXINE SODIUM 0.05 MG/1
50 TABLET ORAL DAILY
Qty: 90 TABLET | Refills: 0 | Status: SHIPPED | OUTPATIENT
Start: 2023-12-06

## 2023-12-06 RX ORDER — SIMVASTATIN 20 MG
20 TABLET ORAL DAILY
Qty: 90 TABLET | Refills: 0 | Status: SHIPPED | OUTPATIENT
Start: 2023-12-06

## 2023-12-06 RX ORDER — AMLODIPINE BESYLATE 2.5 MG/1
2.5 TABLET ORAL DAILY
Qty: 90 TABLET | Refills: 0 | Status: SHIPPED | OUTPATIENT
Start: 2023-12-06

## 2023-12-06 RX ORDER — IBUPROFEN 800 MG/1
800 TABLET ORAL EVERY 8 HOURS PRN
Qty: 25 TABLET | Refills: 0 | Status: SHIPPED | OUTPATIENT
Start: 2023-12-06

## 2023-12-06 RX ORDER — LISINOPRIL 20 MG/1
20 TABLET ORAL DAILY
Qty: 90 TABLET | Refills: 0 | Status: SHIPPED | OUTPATIENT
Start: 2023-12-06

## 2023-12-06 RX ORDER — AMLODIPINE BESYLATE 5 MG/1
5 TABLET ORAL DAILY
Qty: 90 TABLET | Refills: 0 | Status: SHIPPED | OUTPATIENT
Start: 2023-12-06

## 2023-12-07 ENCOUNTER — TELEPHONE (OUTPATIENT)
Dept: PRIMARY CARE CLINIC | Age: 88
End: 2023-12-07

## 2023-12-07 DIAGNOSIS — M81.0 OSTEOPOROSIS, UNSPECIFIED OSTEOPOROSIS TYPE, UNSPECIFIED PATHOLOGICAL FRACTURE PRESENCE: Primary | ICD-10-CM

## 2023-12-11 DIAGNOSIS — M81.0 SENILE OSTEOPOROSIS: ICD-10-CM

## 2023-12-11 DIAGNOSIS — I10 ESSENTIAL HYPERTENSION: Primary | ICD-10-CM

## 2023-12-11 DIAGNOSIS — I10 ESSENTIAL HYPERTENSION: ICD-10-CM

## 2023-12-11 LAB
ALBUMIN SERPL-MCNC: 4.4 G/DL (ref 3.5–5.2)
ALP BLD-CCNC: 53 U/L (ref 35–104)
ALT SERPL-CCNC: 14 U/L (ref 0–32)
ANION GAP SERPL CALCULATED.3IONS-SCNC: 14 MMOL/L (ref 7–16)
AST SERPL-CCNC: 17 U/L (ref 0–31)
BILIRUB SERPL-MCNC: 0.7 MG/DL (ref 0–1.2)
BUN BLDV-MCNC: 25 MG/DL (ref 6–23)
CALCIUM SERPL-MCNC: 9.7 MG/DL (ref 8.6–10.2)
CHLORIDE BLD-SCNC: 104 MMOL/L (ref 98–107)
CO2: 23 MMOL/L (ref 22–29)
CREAT SERPL-MCNC: 1.3 MG/DL (ref 0.5–1)
GFR SERPL CREATININE-BSD FRML MDRD: 39 ML/MIN/1.73M2
GLUCOSE BLD-MCNC: 86 MG/DL (ref 74–99)
POTASSIUM SERPL-SCNC: 4.4 MMOL/L (ref 3.5–5)
SODIUM BLD-SCNC: 141 MMOL/L (ref 132–146)
TOTAL PROTEIN: 7 G/DL (ref 6.4–8.3)

## 2023-12-11 RX ORDER — DENOSUMAB 60 MG/ML
60 INJECTION SUBCUTANEOUS ONCE
Qty: 1 ML | Refills: 0 | Status: SHIPPED | OUTPATIENT
Start: 2023-12-11 | End: 2023-12-11

## 2023-12-15 ENCOUNTER — HOSPITAL ENCOUNTER (OUTPATIENT)
Dept: INFUSION THERAPY | Age: 88
Setting detail: INFUSION SERIES
Discharge: HOME | End: 2023-12-15
Payer: MEDICARE

## 2023-12-15 VITALS
RESPIRATION RATE: 20 BRPM | SYSTOLIC BLOOD PRESSURE: 148 MMHG | TEMPERATURE: 97.5 F | HEART RATE: 70 BPM | DIASTOLIC BLOOD PRESSURE: 70 MMHG | OXYGEN SATURATION: 96 %

## 2023-12-15 DIAGNOSIS — M81.0 OSTEOPOROSIS, UNSPECIFIED OSTEOPOROSIS TYPE, UNSPECIFIED PATHOLOGICAL FRACTURE PRESENCE: Primary | ICD-10-CM

## 2023-12-15 PROCEDURE — 96372 THER/PROPH/DIAG INJ SC/IM: CPT

## 2023-12-15 PROCEDURE — 6360000002 HC RX W HCPCS: Performed by: INTERNAL MEDICINE

## 2023-12-15 RX ADMIN — DENOSUMAB 60 MG: 60 INJECTION SUBCUTANEOUS at 09:52

## 2024-01-11 ENCOUNTER — TELEPHONE (OUTPATIENT)
Dept: PRIMARY CARE CLINIC | Age: 89
End: 2024-01-11

## 2024-01-11 DIAGNOSIS — I10 ESSENTIAL HYPERTENSION: ICD-10-CM

## 2024-03-08 DIAGNOSIS — L30.9 ECZEMA, UNSPECIFIED TYPE: Primary | ICD-10-CM

## 2024-03-08 RX ORDER — CLOBETASOL PROPIONATE 0.5 MG/G
CREAM TOPICAL 2 TIMES DAILY
Qty: 15 G | Refills: 1 | Status: SHIPPED | OUTPATIENT
Start: 2024-03-08

## 2024-03-08 RX ORDER — CLOBETASOL PROPIONATE 0.5 MG/G
CREAM TOPICAL 2 TIMES DAILY
COMMUNITY
End: 2024-03-08 | Stop reason: SDUPTHER

## 2024-03-11 DIAGNOSIS — I10 ESSENTIAL HYPERTENSION: ICD-10-CM

## 2024-03-11 DIAGNOSIS — R21 RASH DUE TO ALLERGY: ICD-10-CM

## 2024-03-11 DIAGNOSIS — J20.9 ACUTE BRONCHITIS, UNSPECIFIED ORGANISM: ICD-10-CM

## 2024-03-11 DIAGNOSIS — T78.40XA RASH DUE TO ALLERGY: ICD-10-CM

## 2024-03-11 DIAGNOSIS — E03.9 ACQUIRED HYPOTHYROIDISM: ICD-10-CM

## 2024-03-11 DIAGNOSIS — E78.2 MIXED HYPERLIPIDEMIA: ICD-10-CM

## 2024-03-11 RX ORDER — PANTOPRAZOLE SODIUM 40 MG/1
40 TABLET, DELAYED RELEASE ORAL DAILY
Qty: 90 TABLET | Refills: 0 | Status: SHIPPED | OUTPATIENT
Start: 2024-03-11

## 2024-03-11 RX ORDER — AMLODIPINE BESYLATE 5 MG/1
5 TABLET ORAL DAILY
Qty: 90 TABLET | Refills: 0 | Status: SHIPPED | OUTPATIENT
Start: 2024-03-11

## 2024-03-11 RX ORDER — SIMVASTATIN 20 MG
20 TABLET ORAL DAILY
Qty: 90 TABLET | Refills: 0 | Status: SHIPPED | OUTPATIENT
Start: 2024-03-11

## 2024-03-11 RX ORDER — CETIRIZINE HYDROCHLORIDE 10 MG/1
5 TABLET ORAL DAILY
Qty: 90 TABLET | Refills: 0 | Status: SHIPPED | OUTPATIENT
Start: 2024-03-11

## 2024-03-11 RX ORDER — LISINOPRIL 20 MG/1
20 TABLET ORAL DAILY
Qty: 90 TABLET | Refills: 0 | Status: SHIPPED | OUTPATIENT
Start: 2024-03-11

## 2024-03-11 RX ORDER — LEVOTHYROXINE SODIUM 0.05 MG/1
50 TABLET ORAL DAILY
Qty: 90 TABLET | Refills: 0 | Status: SHIPPED | OUTPATIENT
Start: 2024-03-11

## 2024-03-11 RX ORDER — CLOBETASOL PROPIONATE 0.5 MG/G
CREAM TOPICAL 2 TIMES DAILY
Qty: 15 G | Refills: 0 | OUTPATIENT
Start: 2024-03-11

## 2024-03-11 RX ORDER — AMLODIPINE BESYLATE 2.5 MG/1
2.5 TABLET ORAL DAILY
Qty: 90 TABLET | Refills: 0 | Status: SHIPPED | OUTPATIENT
Start: 2024-03-11

## 2024-04-08 ENCOUNTER — OFFICE VISIT (OUTPATIENT)
Dept: PRIMARY CARE CLINIC | Age: 89
End: 2024-04-08
Payer: MEDICARE

## 2024-04-08 VITALS
OXYGEN SATURATION: 97 % | HEIGHT: 64 IN | WEIGHT: 211.1 LBS | HEART RATE: 68 BPM | SYSTOLIC BLOOD PRESSURE: 124 MMHG | DIASTOLIC BLOOD PRESSURE: 84 MMHG | TEMPERATURE: 98.7 F | BODY MASS INDEX: 36.04 KG/M2

## 2024-04-08 DIAGNOSIS — H61.23 BILATERAL HEARING LOSS DUE TO CERUMEN IMPACTION: Primary | ICD-10-CM

## 2024-04-08 DIAGNOSIS — R60.0 BILATERAL LEG EDEMA: ICD-10-CM

## 2024-04-08 DIAGNOSIS — I10 ESSENTIAL HYPERTENSION: ICD-10-CM

## 2024-04-08 PROCEDURE — 1123F ACP DISCUSS/DSCN MKR DOCD: CPT | Performed by: INTERNAL MEDICINE

## 2024-04-08 PROCEDURE — 99213 OFFICE O/P EST LOW 20 MIN: CPT | Performed by: INTERNAL MEDICINE

## 2024-04-08 ASSESSMENT — PATIENT HEALTH QUESTIONNAIRE - PHQ9
7. TROUBLE CONCENTRATING ON THINGS, SUCH AS READING THE NEWSPAPER OR WATCHING TELEVISION: NOT AT ALL
SUM OF ALL RESPONSES TO PHQ QUESTIONS 1-9: 3
5. POOR APPETITE OR OVEREATING: NOT AT ALL
10. IF YOU CHECKED OFF ANY PROBLEMS, HOW DIFFICULT HAVE THESE PROBLEMS MADE IT FOR YOU TO DO YOUR WORK, TAKE CARE OF THINGS AT HOME, OR GET ALONG WITH OTHER PEOPLE: NOT DIFFICULT AT ALL
SUM OF ALL RESPONSES TO PHQ9 QUESTIONS 1 & 2: 0
SUM OF ALL RESPONSES TO PHQ QUESTIONS 1-9: 3
SUM OF ALL RESPONSES TO PHQ QUESTIONS 1-9: 3
6. FEELING BAD ABOUT YOURSELF - OR THAT YOU ARE A FAILURE OR HAVE LET YOURSELF OR YOUR FAMILY DOWN: NOT AT ALL
4. FEELING TIRED OR HAVING LITTLE ENERGY: NOT AT ALL
1. LITTLE INTEREST OR PLEASURE IN DOING THINGS: NOT AT ALL
SUM OF ALL RESPONSES TO PHQ QUESTIONS 1-9: 3
8. MOVING OR SPEAKING SO SLOWLY THAT OTHER PEOPLE COULD HAVE NOTICED. OR THE OPPOSITE, BEING SO FIGETY OR RESTLESS THAT YOU HAVE BEEN MOVING AROUND A LOT MORE THAN USUAL: NOT AT ALL
9. THOUGHTS THAT YOU WOULD BE BETTER OFF DEAD, OR OF HURTING YOURSELF: NOT AT ALL
2. FEELING DOWN, DEPRESSED OR HOPELESS: NOT AT ALL
3. TROUBLE FALLING OR STAYING ASLEEP: NEARLY EVERY DAY

## 2024-04-08 NOTE — PROGRESS NOTES
unremarkable  Neurological exam : unremarkable  Extremities:  No clubbing, cyanosis or edema  Skin: unremarkable    Hemoglobin A1C   Date Value Ref Range Status   11/03/2010 5.7 4.8 - 6.0 % Final     Cholesterol, Total   Date Value Ref Range Status   12/29/2017 146 0 - 199 mg/dL Final     Triglycerides   Date Value Ref Range Status   12/29/2017 112 0 - 149 mg/dL Final     HDL   Date Value Ref Range Status   12/29/2017 49 >40 mg/dL Final     LDL Calculated   Date Value Ref Range Status   12/29/2017 75 0 - 99 mg/dL Final     Sodium   Date Value Ref Range Status   12/11/2023 141 132 - 146 mmol/L Final     Potassium   Date Value Ref Range Status   12/11/2023 4.4 3.5 - 5.0 mmol/L Final     Chloride   Date Value Ref Range Status   12/11/2023 104 98 - 107 mmol/L Final     CO2   Date Value Ref Range Status   12/11/2023 23 22 - 29 mmol/L Final     BUN   Date Value Ref Range Status   12/11/2023 25 (H) 6 - 23 mg/dL Final     Creatinine   Date Value Ref Range Status   12/11/2023 1.3 (H) 0.50 - 1.00 mg/dL Final     Glucose   Date Value Ref Range Status   12/11/2023 86 74 - 99 mg/dL Final     Calcium   Date Value Ref Range Status   12/11/2023 9.7 8.6 - 10.2 mg/dL Final     Total Protein   Date Value Ref Range Status   12/11/2023 7.0 6.4 - 8.3 g/dL Final     Albumin   Date Value Ref Range Status   12/11/2023 4.4 3.5 - 5.2 g/dL Final     Total Bilirubin   Date Value Ref Range Status   12/11/2023 0.7 0.0 - 1.2 mg/dL Final     Alkaline Phosphatase   Date Value Ref Range Status   12/11/2023 53 35 - 104 U/L Final     AST   Date Value Ref Range Status   12/11/2023 17 0 - 31 U/L Final     ALT   Date Value Ref Range Status   12/11/2023 14 0 - 32 U/L Final     Est, Glom Filt Rate   Date Value Ref Range Status   12/11/2023 39 (L) >60 mL/min/1.73m2 Final     Comment:       These results are not intended for use in patients <18 years of age.    eGFR results are calculated without a race factor using the 2021 CKD-EPI equation.  Careful

## 2024-06-03 ENCOUNTER — HOSPITAL ENCOUNTER (OUTPATIENT)
Age: 89
Discharge: HOME | End: 2024-06-03
Payer: MEDICARE

## 2024-06-03 ENCOUNTER — OFFICE VISIT (OUTPATIENT)
Dept: PRIMARY CARE CLINIC | Age: 89
End: 2024-06-03
Payer: MEDICARE

## 2024-06-03 ENCOUNTER — HOSPITAL ENCOUNTER (OUTPATIENT)
Dept: GENERAL RADIOLOGY | Age: 89
Discharge: HOME | End: 2024-06-05

## 2024-06-03 ENCOUNTER — HOSPITAL ENCOUNTER (OUTPATIENT)
Dept: GENERAL RADIOLOGY | Age: 89
Discharge: HOME | End: 2024-06-05
Payer: MEDICARE

## 2024-06-03 VITALS
DIASTOLIC BLOOD PRESSURE: 70 MMHG | BODY MASS INDEX: 35.94 KG/M2 | RESPIRATION RATE: 16 BRPM | WEIGHT: 210.5 LBS | HEART RATE: 84 BPM | SYSTOLIC BLOOD PRESSURE: 120 MMHG | TEMPERATURE: 98.2 F | HEIGHT: 64 IN | OXYGEN SATURATION: 96 %

## 2024-06-03 DIAGNOSIS — T78.40XA RASH DUE TO ALLERGY: ICD-10-CM
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
## 2024-06-03 DIAGNOSIS — E03.9 ACQUIRED HYPOTHYROIDISM: ICD-10-CM

## 2024-06-03 DIAGNOSIS — R21 RASH DUE TO ALLERGY: ICD-10-CM

## 2024-06-03 DIAGNOSIS — E78.2 MIXED HYPERLIPIDEMIA: ICD-10-CM

## 2024-06-03 DIAGNOSIS — M81.0 SENILE OSTEOPOROSIS: ICD-10-CM

## 2024-06-03 DIAGNOSIS — M62.81 QUADRICEPS WEAKNESS: Primary | ICD-10-CM

## 2024-06-03 DIAGNOSIS — Z91.81 AT HIGH RISK FOR FALLS: ICD-10-CM

## 2024-06-03 DIAGNOSIS — I10 ESSENTIAL HYPERTENSION: ICD-10-CM

## 2024-06-03 LAB
ALBUMIN SERPL-MCNC: 4 G/DL (ref 3.5–5.2)
ALP SERPL-CCNC: 52 U/L (ref 35–104)
ALT SERPL-CCNC: 12 U/L (ref 0–32)
ANION GAP SERPL CALCULATED.3IONS-SCNC: 13 MMOL/L (ref 7–16)
AST SERPL-CCNC: 15 U/L (ref 0–31)
BILIRUB SERPL-MCNC: 0.5 MG/DL (ref 0–1.2)
BUN SERPL-MCNC: 41 MG/DL (ref 6–23)
CALCIUM SERPL-MCNC: 9.8 MG/DL (ref 8.6–10.2)
CHLORIDE SERPL-SCNC: 105 MMOL/L (ref 98–107)
CHOLEST SERPL-MCNC: 146 MG/DL
CO2 SERPL-SCNC: 24 MMOL/L (ref 22–29)
CREAT SERPL-MCNC: 1.5 MG/DL (ref 0.5–1)
ERYTHROCYTE [DISTWIDTH] IN BLOOD BY AUTOMATED COUNT: 14.7 % (ref 12–16)
GFR, ESTIMATED: 33 ML/MIN/1.73M2
GLUCOSE SERPL-MCNC: 108 MG/DL (ref 74–99)
HCT VFR BLD AUTO: 41.2 % (ref 34–48)
HDLC SERPL-MCNC: 43 MG/DL
HGB BLD-MCNC: 13.8 G/DL (ref 11.5–15.5)
LDLC SERPL CALC-MCNC: 82 MG/DL
MCH RBC QN AUTO: 28.3 PG (ref 26–35)
MCHC RBC AUTO-ENTMCNC: 33.5 G/DL (ref 32–34.5)
MCV RBC AUTO: 84.4 FL (ref 80–99.9)
PLATELET # BLD AUTO: 221 K/UL (ref 130–450)
PMV BLD AUTO: 11.2 FL (ref 7–12)
POTASSIUM SERPL-SCNC: 4.9 MMOL/L (ref 3.5–5)
PROT SERPL-MCNC: 7 G/DL (ref 6.4–8.3)
RBC # BLD AUTO: 4.88 M/UL (ref 3.5–5.5)
SODIUM SERPL-SCNC: 142 MMOL/L (ref 132–146)
TRIGL SERPL-MCNC: 104 MG/DL
TSH SERPL DL<=0.05 MIU/L-ACNC: 1.63 UIU/ML (ref 0.27–4.2)
VLDLC SERPL CALC-MCNC: 21 MG/DL
WBC OTHER # BLD: 8.4 K/UL (ref 4.5–11.5)

## 2024-06-03 PROCEDURE — 84443 ASSAY THYROID STIM HORMONE: CPT

## 2024-06-03 PROCEDURE — 36415 COLL VENOUS BLD VENIPUNCTURE: CPT

## 2024-06-03 PROCEDURE — 85027 COMPLETE CBC AUTOMATED: CPT

## 2024-06-03 PROCEDURE — 73560 X-RAY EXAM OF KNEE 1 OR 2: CPT

## 2024-06-03 PROCEDURE — 71100 X-RAY EXAM RIBS UNI 2 VIEWS: CPT

## 2024-06-03 PROCEDURE — 80061 LIPID PANEL: CPT

## 2024-06-03 PROCEDURE — 73610 X-RAY EXAM OF ANKLE: CPT

## 2024-06-03 PROCEDURE — 80053 COMPREHEN METABOLIC PANEL: CPT

## 2024-06-03 PROCEDURE — 1123F ACP DISCUSS/DSCN MKR DOCD: CPT | Performed by: INTERNAL MEDICINE

## 2024-06-03 PROCEDURE — 99215 OFFICE O/P EST HI 40 MIN: CPT | Performed by: INTERNAL MEDICINE

## 2024-06-03 RX ORDER — IBUPROFEN 800 MG/1
800 TABLET ORAL EVERY 8 HOURS PRN
Qty: 25 TABLET | Refills: 0 | Status: CANCELLED | OUTPATIENT
Start: 2024-06-03

## 2024-06-03 RX ORDER — ASPIRIN 81 MG/1
81 TABLET ORAL DAILY
Qty: 30 TABLET | Status: CANCELLED | OUTPATIENT
Start: 2024-06-03

## 2024-06-03 RX ORDER — LISINOPRIL 20 MG/1
20 TABLET ORAL DAILY
Qty: 90 TABLET | Refills: 0 | Status: SHIPPED | OUTPATIENT
Start: 2024-06-03

## 2024-06-03 RX ORDER — AMLODIPINE BESYLATE 2.5 MG/1
2.5 TABLET ORAL DAILY
Qty: 90 TABLET | Refills: 0 | Status: SHIPPED | OUTPATIENT
Start: 2024-06-03

## 2024-06-03 RX ORDER — PANTOPRAZOLE SODIUM 40 MG/1
40 TABLET, DELAYED RELEASE ORAL DAILY
Qty: 90 TABLET | Refills: 0 | Status: SHIPPED | OUTPATIENT
Start: 2024-06-03

## 2024-06-03 RX ORDER — MICONAZOLE NITRATE 20 MG/G
POWDER TOPICAL
Qty: 100 G | Refills: 0 | Status: SHIPPED | OUTPATIENT
Start: 2024-06-03

## 2024-06-03 RX ORDER — SIMVASTATIN 20 MG
20 TABLET ORAL DAILY
Qty: 90 TABLET | Refills: 0 | Status: SHIPPED | OUTPATIENT
Start: 2024-06-03

## 2024-06-03 RX ORDER — LEVOTHYROXINE SODIUM 0.05 MG/1
50 TABLET ORAL DAILY
Qty: 90 TABLET | Refills: 0 | Status: SHIPPED | OUTPATIENT
Start: 2024-06-03

## 2024-06-03 RX ORDER — AMLODIPINE BESYLATE 5 MG/1
5 TABLET ORAL DAILY
Qty: 90 TABLET | Refills: 0 | Status: SHIPPED | OUTPATIENT
Start: 2024-06-03

## 2024-06-03 SDOH — ECONOMIC STABILITY: INCOME INSECURITY: HOW HARD IS IT FOR YOU TO PAY FOR THE VERY BASICS LIKE FOOD, HOUSING, MEDICAL CARE, AND HEATING?: NOT HARD AT ALL

## 2024-06-03 SDOH — ECONOMIC STABILITY: FOOD INSECURITY: WITHIN THE PAST 12 MONTHS, THE FOOD YOU BOUGHT JUST DIDN'T LAST AND YOU DIDN'T HAVE MONEY TO GET MORE.: NEVER TRUE

## 2024-06-03 SDOH — ECONOMIC STABILITY: FOOD INSECURITY: WITHIN THE PAST 12 MONTHS, YOU WORRIED THAT YOUR FOOD WOULD RUN OUT BEFORE YOU GOT MONEY TO BUY MORE.: NEVER TRUE

## 2024-06-03 NOTE — PROGRESS NOTES
Chief Complaint   Patient presents with    Dizziness     Dizziness x1 week.       HPI:  Patient is here for acute visit.  Patient felt off balance on Thursday 4 days ago fell on her back porch  On her left side bumped her left lower ribs left knee and left ankle  Patient denied any loss of consciousness or head trauma, she did not go to emergency room because she does not want to wait in the waiting room  Patient is compliant on medications she states she sits a lot at home  Ambulates with a cane  No cardiopulmonary symptoms.  No neurosymptoms      Past Medical History, Surgical History, and Family History has been reviewed and updated.    Review of Systems:  Constitutional:  No fever, no fatigue, no chills, no headaches, no weight change  Dermatology:  No rash, no mole, no dry or sensitive skin  ENT:  No cough, no sore throat, no sinus pain, no runny nose, no ear pain  Cardiology:  No chest pain, no palpitations, no leg edema, no shortness of breath, no PND  Gastroenterology:  No dysphagia, no abdominal pain, no nausea, no vomiting, no constipation, no diarrhea, no heartburn  Musculoskeletal:  No joint pain, no leg cramps, no back pain, no muscle aches  Respiratory:  No shortness of breath, no orthopnea, no wheezing, no ASHLEY, no hemoptysis  Urology:  No blood in the urine, no urinary frequency, no urinary incontinence, no urinary urgency, no nocturia, no dysuria    Vitals:    06/03/24 1530 06/03/24 1626   BP: 100/60 120/70   Pulse: 84    Resp: 16    Temp: 98.2 °F (36.8 °C)    TempSrc: Oral    SpO2: 96%    Weight: 95.5 kg (210 lb 8 oz)    Height: 1.626 m (5' 4\")        General:  Patient alert and oriented x 3, NAD, pleasant  HEENT:  Atraumatic, normocephalic, PERRLA, EOMI, clear conjunctiva, TMs clear, nose-clear, throat - no erythema  Neck:  Supple, no goiter, no carotid bruits, no LAD  Lungs:  CTA   Heart:  RRR, no murmurs, gallops or rubs  Abdomen:  Soft/nt/nd, + bowel sounds  Lymph node examination:

## 2024-06-07 ENCOUNTER — TELEPHONE (OUTPATIENT)
Dept: PRIMARY CARE CLINIC | Age: 89
End: 2024-06-07

## 2024-06-07 NOTE — TELEPHONE ENCOUNTER
Mehnaz from the infusion center needs new script for prolia infusion faxed to the center     Appt on 06/17/24

## 2024-06-12 DIAGNOSIS — M81.0 SENILE OSTEOPOROSIS: Primary | ICD-10-CM

## 2024-06-12 RX ORDER — DENOSUMAB 60 MG/ML
60 INJECTION SUBCUTANEOUS ONCE
Qty: 1 ML | Refills: 0 | Status: SHIPPED | OUTPATIENT
Start: 2024-06-12 | End: 2024-06-12

## 2024-06-17 ENCOUNTER — HOSPITAL ENCOUNTER (OUTPATIENT)
Dept: INFUSION THERAPY | Age: 89
Setting detail: INFUSION SERIES
Discharge: HOME | End: 2024-06-17
Payer: MEDICARE

## 2024-06-17 VITALS
DIASTOLIC BLOOD PRESSURE: 65 MMHG | HEART RATE: 69 BPM | RESPIRATION RATE: 20 BRPM | SYSTOLIC BLOOD PRESSURE: 145 MMHG | OXYGEN SATURATION: 96 % | TEMPERATURE: 98.4 F

## 2024-06-17 DIAGNOSIS — M81.0 OSTEOPOROSIS, UNSPECIFIED OSTEOPOROSIS TYPE, UNSPECIFIED PATHOLOGICAL FRACTURE PRESENCE: Primary | ICD-10-CM

## 2024-06-17 PROCEDURE — 96372 THER/PROPH/DIAG INJ SC/IM: CPT

## 2024-06-17 PROCEDURE — 6360000002 HC RX W HCPCS: Performed by: INTERNAL MEDICINE

## 2024-06-17 RX ADMIN — DENOSUMAB 60 MG: 60 INJECTION SUBCUTANEOUS at 09:48

## 2024-06-22 ENCOUNTER — APPOINTMENT (OUTPATIENT)
Dept: ULTRASOUND IMAGING | Age: 89
End: 2024-06-22
Payer: MEDICARE

## 2024-06-22 ENCOUNTER — APPOINTMENT (OUTPATIENT)
Dept: GENERAL RADIOLOGY | Age: 89
End: 2024-06-22
Payer: MEDICARE

## 2024-06-22 ENCOUNTER — HOSPITAL ENCOUNTER (EMERGENCY)
Age: 89
Discharge: HOME | End: 2024-06-22
Payer: MEDICARE

## 2024-06-22 VITALS
HEART RATE: 76 BPM | SYSTOLIC BLOOD PRESSURE: 197 MMHG | RESPIRATION RATE: 18 BRPM | TEMPERATURE: 97.7 F | DIASTOLIC BLOOD PRESSURE: 91 MMHG | OXYGEN SATURATION: 96 %

## 2024-06-22 DIAGNOSIS — I10 ESSENTIAL HYPERTENSION: ICD-10-CM

## 2024-06-22 DIAGNOSIS — M79.604 RIGHT LEG PAIN: Primary | ICD-10-CM

## 2024-06-22 PROCEDURE — 73552 X-RAY EXAM OF FEMUR 2/>: CPT

## 2024-06-22 PROCEDURE — 99284 EMERGENCY DEPT VISIT MOD MDM: CPT

## 2024-06-22 PROCEDURE — 73502 X-RAY EXAM HIP UNI 2-3 VIEWS: CPT

## 2024-06-22 PROCEDURE — 6370000000 HC RX 637 (ALT 250 FOR IP): Performed by: PHYSICIAN ASSISTANT

## 2024-06-22 PROCEDURE — 93971 EXTREMITY STUDY: CPT

## 2024-06-22 RX ORDER — PREDNISONE 10 MG/1
40 TABLET ORAL DAILY
Qty: 20 TABLET | Refills: 0 | Status: SHIPPED | OUTPATIENT
Start: 2024-06-22 | End: 2024-06-26 | Stop reason: ALTCHOICE

## 2024-06-22 RX ORDER — LIDOCAINE 50 MG/G
1 PATCH TOPICAL EVERY 24 HOURS
Qty: 10 PATCH | Refills: 0 | Status: SHIPPED | OUTPATIENT
Start: 2024-06-22 | End: 2024-07-02

## 2024-06-22 RX ORDER — CLONIDINE HYDROCHLORIDE 0.1 MG/1
0.1 TABLET ORAL ONCE
Status: COMPLETED | OUTPATIENT
Start: 2024-06-22 | End: 2024-06-22

## 2024-06-22 RX ORDER — ACETAMINOPHEN 325 MG/1
650 TABLET ORAL ONCE
Status: COMPLETED | OUTPATIENT
Start: 2024-06-22 | End: 2024-06-22

## 2024-06-22 RX ADMIN — ACETAMINOPHEN 650 MG: 325 TABLET ORAL at 11:59

## 2024-06-22 RX ADMIN — CLONIDINE HYDROCHLORIDE 0.1 MG: 0.1 TABLET ORAL at 15:00

## 2024-06-22 ASSESSMENT — PAIN SCALES - GENERAL: PAINLEVEL_OUTOF10: 5

## 2024-06-22 NOTE — DISCHARGE INSTRUCTIONS
Tylenol q4 hours as needed   Keep a log of blood pressure to follow-up with primary care regarding elevated blood pressure

## 2024-06-22 NOTE — ED PROVIDER NOTES
Independent JESSICA Visit.   HPI:  6/22/24, Time: 11:12 AM EDT         Ivanna Be is a 89 y.o. female presenting to the ED for right leg pain, beginning 3 days ago.  The complaint has been persistent, moderate in severity, and worsened by walking.      Patient states she fell 3 weeks ago injuring her left lower back and her left arm.  She had x-rays at that time which were negative.  She developed some right upper leg pain over the last couple days having pain with weightbearing.  She denies any swelling to the legs.  No numbness tingling.  She is able to ambulate with a cane but with pain patient has chronic lower back pain that is unchanged patient's been putting a anti-inflammatory cream over the area of pain with no improvement      Review of Systems:   A complete review of systems was performed and pertinent positives and negatives are stated within HPI, all other systems reviewed and are negative.          --------------------------------------------- PAST HISTORY ---------------------------------------------  Past Medical History:  has a past medical history of Allergy, Arthritis, Chest pain, Chronic back pain, Depression, GERD (gastroesophageal reflux disease), Headache(784.0), History of blood transfusion, Hyperlipidemia, Hypertension, Hypothyroid, Incontinence of urine, Memory changes, Pneumonia, Thyroid disease, Urinary tract infection, Varicella, and Vision abnormalities.    Past Surgical History:  has a past surgical history that includes Wrist fracture surgery (2008); Cholecystectomy; Hysterectomy; Hemorrhoid surgery (40 yrs ago); Closed Reduction of a Joint (10-); Knee arthroscopy (1/19/12); lumbar drain implantation (6/27/2012); Appendectomy; fracture surgery; Tonsillectomy; ECHO Compl W Dop Color Flow (7/20/2012); other surgical history (07/23/2012); back surgery; Cystocopy ([id number]); Cystocopy (6/10/14); Bladder surgery; Cystocopy (12/9/14); Cystocopy ([id number]); Cystocopy ([id number]);  [place] [id number]  [phone]    Call in 2 days        MEDICATIONS:   DISCHARGE MEDICATIONS:  New Prescriptions    LIDOCAINE (LIDODERM) 5 %    Place 1 patch onto the skin every 24 hours for 10 days 12 hours on, 12 hours off.    PREDNISONE (DELTASONE) 10 MG TABLET    Take 4 tablets by mouth daily for 5 days       DISCONTINUED MEDICATIONS:  Discontinued Medications    No medications on file       Record Review:  Records Reviewed : Source recent emergency encounter       Disposition Considerations:  This patient's ED course included: a personal history and physicial examination and re-evaluation prior to disposition  This patient has remained hemodynamically stable during their ED course.       I emphasized the importance of follow-up with the physician I referred them to in the timeframe recommended.  I discussed with the patient emergent symptoms and the need to immediately return to the ER. Written information was included in their discharge instructions. Additional verbal discharge instructions were also given and discussed with the patient to supplement those generated by the EMR. We also discussed medications that were prescribed  (if any) including common side effects and interactions. The patient was advised to abstain from driving, operating heavy machinery or making significant decisions while taking medications such as opiates and muscle relaxers that may impair this. All questions were addressed.  They understand return precautions and discharge instructions. The patient  expressed understanding. Vitals were stable and they were in no distress at discharge.     Counseling:   The emergency provider has spoken with the patient and discussed today’s results, in addition to providing specific details for the plan of care and counseling regarding the diagnosis and prognosis.  Questions are answered at this time and they are agreeable with the plan.      --------------------------------- IMPRESSION AND DISPOSITION

## 2024-06-22 NOTE — DISCHARGE INSTR - COC
Continuity of Care Form    Patient Name: Ivanna Be   :  1932  MRN:  [id number]    Admit date:  2024  Discharge date:  ***    Code Status Order: Prior   Advance Directives:     Admitting Physician:  No admitting provider for patient encounter.  PCP: Susana Sosa MD    Discharging Nurse: ***  Discharging Hospital Unit/Room#:   Discharging Unit Phone Number: ***    Emergency Contact:   Extended Emergency Contact Information  Primary Emergency Contact: Anastasiya Dowd Gerhardeneida  Address: [address] [place] [address]  Home Phone: [phone]  Work Phone: [phone]  Mobile Phone: [phone]  Relation: Brother/Sister    Past Surgical History:  Past Surgical History:   Procedure Laterality Date    APPENDECTOMY      BACK SURGERY      BLADDER SURGERY      CHOLECYSTECTOMY      CLOSED REDUCTION OF A JOINT  10-    right little finger    CYSTOSCOPY  [id number]    BOTOX INJECTION    CYSTOSCOPY  6/10/14    Bladder botox injection    CYSTOSCOPY  14    Botox injection bladder    CYSTOSCOPY  [id number]    BOTOX    CYSTOSCOPY  [id number]    BOTOX    CYSTOSCOPY N/A 2019    CYSTOSCOPY  BOTOX 200 UNITS INJECTION--INTERSTIM FULL EXPLANT performed by Keny Antunez MD at [facility] [place]    CYSTOSCOPY N/A 2020    CYSTOSCOPY, BOTOX  INJECTION 300 UNITS performed by Keny Antunez MD at [facility]    ECHO COMPL W DOP COLOR FLOW  2012         FRACTURE SURGERY      HEMORRHOID SURGERY  40 yrs ago    HYSTERECTOMY (CERVIX STATUS UNKNOWN)      KNEE ARTHROSCOPY  12    left     LUMBAR DRAIN IMPLANTATION  2012    OTHER SURGICAL HISTORY  2012    repair lumbar meningiocele with lumbar drain    OTHER SURGICAL HISTORY N/A 10/04/2016    sacral nerve stimulation    TONSILLECTOMY      UPPER GASTROINTESTINAL ENDOSCOPY N/A 2019    EGD ESOPHAGOGASTRODUODENOSCOPY performed by Sandeep Olivas DO at [facility]    UPPER GASTROINTESTINAL ENDOSCOPY N/A  Winsome Smith, RN    12/09/14,URINE E. COLI MDRO/.ESBL+                       Nurse Assessment:  Last Vital Signs: BP (!) 197/91   Pulse 76   Temp 97.7 °F (36.5 °C) (Infrared)   Resp 18   SpO2 96%     Last documented pain score (0-10 scale): Pain Level: 5  Last Weight:   Wt Readings from Last 1 Encounters:   06/03/24 95.5 kg (210 lb 8 oz)     Mental Status:  {IP PT MENTAL STATUS:20030}    IV Access:  { ARYA IV ACCESS:206640190}    Nursing Mobility/ADLs:  Walking   {CHP DME ADLs:323185676}  Transfer  {CHP DME ADLs:004475413}  Bathing  {CHP DME ADLs:645914811}  Dressing  {CHP DME ADLs:259470060}  Toileting  {CHP DME ADLs:288082254}  Feeding  {CHP DME ADLs:345942113}  Med Admin  {P DME ADLs:995880708}  Med Delivery   { ARYA MED Delivery:368018650}    Wound Care Documentation and Therapy:  Wound 07/02/12 previous drain site of lumbar drain (Active)   Number of days: 4372        Elimination:  Continence:   Bowel: {YES / NO:19727}  Bladder: {YES / NO:19727}  Urinary Catheter: {Urinary Catheter:667282803}   Colostomy/Ileostomy/Ileal Conduit: {YES / NO:19727}       Date of Last BM: ***  No intake or output data in the 24 hours ending 06/22/24 1514  No intake/output data recorded.    Safety Concerns:     { ARYA Safety Concerns:555534119}    Impairments/Disabilities:      { ARYA Impairments/Disabilities:577246083}    Nutrition Therapy:  Current Nutrition Therapy:   { ARYA Diet List:435015214}    Routes of Feeding: {P DME Other Feedings:069106831}  Liquids: {Slp liquid thickness:07770}  Daily Fluid Restriction: {CHP DME Yes amt example:166970404}  Last Modified Barium Swallow with Video (Video Swallowing Test): {Done Not Done Date:304088012}    Treatments at the Time of Hospital Discharge:   Respiratory Treatments: ***  Oxygen Therapy:  {Therapy; copd oxygen:42216}  Ventilator:    { CC Vent List:192040803}    Rehab Therapies: {THERAPEUTIC INTERVENTION:9762127868}  Weight Bearing Status/Restrictions: { CC Weight

## 2024-06-26 ENCOUNTER — OFFICE VISIT (OUTPATIENT)
Dept: PRIMARY CARE CLINIC | Age: 89
End: 2024-06-26

## 2024-06-26 VITALS
SYSTOLIC BLOOD PRESSURE: 138 MMHG | BODY MASS INDEX: 39.27 KG/M2 | HEIGHT: 61 IN | OXYGEN SATURATION: 97 % | WEIGHT: 208 LBS | DIASTOLIC BLOOD PRESSURE: 72 MMHG | HEART RATE: 73 BPM | TEMPERATURE: 97.6 F

## 2024-06-26 DIAGNOSIS — S86.911D MUSCLE STRAIN OF RIGHT LOWER LEG, SUBSEQUENT ENCOUNTER: Primary | ICD-10-CM

## 2024-06-26 DIAGNOSIS — M62.81 QUADRICEPS WEAKNESS: ICD-10-CM

## 2024-06-26 DIAGNOSIS — I10 ESSENTIAL HYPERTENSION: ICD-10-CM

## 2024-06-26 RX ORDER — TIZANIDINE 4 MG/1
4 TABLET ORAL 2 TIMES DAILY
Qty: 30 TABLET | Refills: 0 | Status: SHIPPED | OUTPATIENT
Start: 2024-06-26

## 2024-06-26 NOTE — PROGRESS NOTES
1 patch onto the skin every 24 hours for 10 days 12 hours on, 12 hours off. 10 patch 0    denosumab (PROLIA) 60 MG/ML SOSY SC injection Inject 1 mL into the skin once for 1 dose 1 mL 0    amLODIPine (NORVASC) 2.5 MG tablet Take 1 tablet by mouth daily 90 tablet 0    amLODIPine (NORVASC) 5 MG tablet Take 1 tablet by mouth daily 90 tablet 0    levothyroxine (SYNTHROID) 50 MCG tablet Take 1 tablet by mouth Daily 90 tablet 0    lisinopril (PRINIVIL;ZESTRIL) 20 MG tablet Take 1 tablet by mouth daily 90 tablet 0    miconazole (ZEASORB-AF) 2 % powder Apply topically 2 times daily. 100 g 0    pantoprazole (PROTONIX) 40 MG tablet Take 1 tablet by mouth daily 90 tablet 0    simvastatin (ZOCOR) 20 MG tablet Take 1 tablet by mouth daily 90 tablet 0    cetirizine (ZYRTEC) 10 MG tablet Take 0.5 tablets by mouth daily 90 tablet 0    clobetasol (TEMOVATE) 0.05 % cream Apply topically 2 times daily Apply topically 2 times daily. 15 g 1    albuterol sulfate HFA (VENTOLIN HFA) 108 (90 Base) MCG/ACT inhaler Inhale 2 puffs into the lungs 4 times daily as needed for Wheezing 1 each 2    VITAMIN E PO Take 1 capsule by mouth daily      aspirin 81 MG EC tablet Take 1 tablet by mouth daily Last dose  1 week ago      [DISCONTINUED] predniSONE (DELTASONE) 10 MG tablet Take 4 tablets by mouth daily for 5 days 20 tablet 0     No facility-administered encounter medications on file as of 6/26/2024.        Ivanna was seen today for leg pain.    Diagnoses and all orders for this visit:    Muscle strain of right lower leg, subsequent encounter  -     tiZANidine (ZANAFLEX) 4 MG tablet; Take 1 tablet by mouth in the morning and at bedtime    Essential hypertension    Quadriceps weakness         There are no Patient Instructions on file for this visit.     On this date 6/26/2024 I have spent 25 minutes reviewing previous notes, test results and face to face with the patient discussing the diagnosis and importance of compliance with the treatment plan

## 2024-09-09 DIAGNOSIS — T78.40XA RASH DUE TO ALLERGY: ICD-10-CM

## 2024-09-09 DIAGNOSIS — E78.2 MIXED HYPERLIPIDEMIA: ICD-10-CM

## 2024-09-09 DIAGNOSIS — R21 RASH DUE TO ALLERGY: ICD-10-CM

## 2024-09-09 DIAGNOSIS — I10 ESSENTIAL HYPERTENSION: ICD-10-CM

## 2024-09-09 DIAGNOSIS — J20.9 ACUTE BRONCHITIS, UNSPECIFIED ORGANISM: ICD-10-CM

## 2024-09-09 DIAGNOSIS — E03.9 ACQUIRED HYPOTHYROIDISM: ICD-10-CM

## 2024-09-09 DIAGNOSIS — S86.911D MUSCLE STRAIN OF RIGHT LOWER LEG, SUBSEQUENT ENCOUNTER: ICD-10-CM

## 2024-09-10 ENCOUNTER — NURSE ONLY (OUTPATIENT)
Dept: PRIMARY CARE CLINIC | Age: 89
End: 2024-09-10
Payer: MEDICARE

## 2024-09-10 DIAGNOSIS — Z23 NEED FOR PROPHYLACTIC VACCINATION AND INOCULATION AGAINST CHOLERA ALONE: Primary | ICD-10-CM

## 2024-09-10 PROCEDURE — G0008 ADMIN INFLUENZA VIRUS VAC: HCPCS | Performed by: INTERNAL MEDICINE

## 2024-09-10 PROCEDURE — 90653 IIV ADJUVANT VACCINE IM: CPT | Performed by: INTERNAL MEDICINE

## 2024-09-11 RX ORDER — CETIRIZINE HYDROCHLORIDE 10 MG/1
5 TABLET ORAL DAILY
Qty: 90 TABLET | Refills: 0 | Status: SHIPPED | OUTPATIENT
Start: 2024-09-11

## 2024-09-11 RX ORDER — SIMVASTATIN 20 MG
20 TABLET ORAL DAILY
Qty: 90 TABLET | Refills: 0 | Status: SHIPPED | OUTPATIENT
Start: 2024-09-11

## 2024-09-11 RX ORDER — ALBUTEROL SULFATE 90 UG/1
2 AEROSOL, METERED RESPIRATORY (INHALATION) 4 TIMES DAILY PRN
Qty: 1 EACH | Refills: 2 | Status: SHIPPED | OUTPATIENT
Start: 2024-09-11

## 2024-09-11 RX ORDER — MICONAZOLE NITRATE 20 MG/G
POWDER TOPICAL
Qty: 100 G | Refills: 0 | Status: SHIPPED | OUTPATIENT
Start: 2024-09-11

## 2024-09-11 RX ORDER — AMLODIPINE BESYLATE 2.5 MG/1
2.5 TABLET ORAL DAILY
Qty: 90 TABLET | Refills: 0 | Status: SHIPPED | OUTPATIENT
Start: 2024-09-11

## 2024-09-11 RX ORDER — LISINOPRIL 20 MG/1
20 TABLET ORAL DAILY
Qty: 90 TABLET | Refills: 0 | Status: SHIPPED | OUTPATIENT
Start: 2024-09-11

## 2024-09-11 RX ORDER — AMLODIPINE BESYLATE 5 MG/1
5 TABLET ORAL DAILY
Qty: 90 TABLET | Refills: 0 | Status: SHIPPED | OUTPATIENT
Start: 2024-09-11

## 2024-09-11 RX ORDER — PANTOPRAZOLE SODIUM 40 MG/1
40 TABLET, DELAYED RELEASE ORAL DAILY
Qty: 90 TABLET | Refills: 0 | Status: SHIPPED | OUTPATIENT
Start: 2024-09-11

## 2024-09-11 RX ORDER — LEVOTHYROXINE SODIUM 50 UG/1
50 TABLET ORAL DAILY
Qty: 90 TABLET | Refills: 0 | Status: SHIPPED | OUTPATIENT
Start: 2024-09-11

## 2024-10-09 ENCOUNTER — OFFICE VISIT (OUTPATIENT)
Dept: PRIMARY CARE CLINIC | Age: 89
End: 2024-10-09

## 2024-10-09 VITALS
TEMPERATURE: 98.2 F | OXYGEN SATURATION: 97 % | SYSTOLIC BLOOD PRESSURE: 122 MMHG | BODY MASS INDEX: 39.59 KG/M2 | WEIGHT: 209.7 LBS | HEIGHT: 61 IN | HEART RATE: 74 BPM | DIASTOLIC BLOOD PRESSURE: 78 MMHG

## 2024-10-09 DIAGNOSIS — R05.8 POST-VIRAL COUGH SYNDROME: ICD-10-CM

## 2024-10-09 DIAGNOSIS — E03.9 ACQUIRED HYPOTHYROIDISM: ICD-10-CM

## 2024-10-09 DIAGNOSIS — J40 BRONCHITIS: Primary | ICD-10-CM

## 2024-10-09 RX ORDER — GUAIFENESIN 600 MG/1
600 TABLET, EXTENDED RELEASE ORAL 2 TIMES DAILY
Qty: 30 TABLET | Refills: 0 | Status: SHIPPED | OUTPATIENT
Start: 2024-10-09 | End: 2024-10-24

## 2024-10-09 RX ORDER — AZITHROMYCIN 250 MG/1
TABLET, FILM COATED ORAL
Qty: 6 TABLET | Refills: 0 | Status: SHIPPED | OUTPATIENT
Start: 2024-10-09 | End: 2024-10-19

## 2024-10-09 RX ORDER — LEVOTHYROXINE SODIUM 50 UG/1
50 TABLET ORAL DAILY
Qty: 90 TABLET | Refills: 0 | Status: SHIPPED | OUTPATIENT
Start: 2024-10-09

## 2024-10-09 RX ORDER — MECLIZINE HCL 12.5 MG 12.5 MG/1
12.5 TABLET ORAL 3 TIMES DAILY PRN
Qty: 30 TABLET | Refills: 0 | Status: SHIPPED | OUTPATIENT
Start: 2024-10-09 | End: 2024-10-19

## 2024-10-09 NOTE — PROGRESS NOTES
Chief Complaint   Patient presents with    Cough     Last two weeks congestion no fever   Patient wants antivert       HPI:  Patient is here for acute visit.  Patient complains of cough for the last 2 weeks productive no fever no chills no GI symptoms, patient did not test for COVID at home  Compliant on medications however she is complaining that her pharmacist did not fill levothyroxine prescription    Patient requested refill on Antivert she gets occasional vertigo episodes that responds to Antivert    Past Medical History, Surgical History, and Family History has been reviewed and updated.    Review of Systems:  Constitutional:  No fever, no fatigue, no chills, no headaches, no weight change  Dermatology:  No rash, no mole, no dry or sensitive skin  ENT:  No cough, no sore throat, no sinus pain, no runny nose, no ear pain  Cardiology:  No chest pain, no palpitations, no leg edema, no shortness of breath, no PND  Gastroenterology:  No dysphagia, no abdominal pain, no nausea, no vomiting, no constipation, no diarrhea, no heartburn  Musculoskeletal:  No joint pain, no leg cramps, no back pain, no muscle aches  Respiratory:  No shortness of breath, no orthopnea, no wheezing, no ASHLEY, no hemoptysis  Urology:  No blood in the urine, no urinary frequency, no urinary incontinence, no urinary urgency, no nocturia, no dysuria    Vitals:    10/09/24 1155   BP: 122/78   Pulse: 74   Temp: 98.2 °F (36.8 °C)   SpO2: 97%   Weight: 95.1 kg (209 lb 11.2 oz)   Height: 1.549 m (5' 0.98\")       General:  Patient alert and oriented x 3, NAD, pleasant  HEENT:  Atraumatic, normocephalic, PERRLA, EOMI, clear conjunctiva, TMs clear, nose-clear, throat - no erythema  Neck:  Supple, no goiter, no carotid bruits, no LAD  Lungs: + Scattered expiratory rhonchi no wheezes no rales resonant to percussion  Heart:  RRR, no murmurs, gallops or rubs  Abdomen:  Soft/nt/nd, + bowel sounds  Lymph node examination: unremarkable  Neurological exam :  [facility] Pregnancy And Lactation Text: The risk during pregnancy and breastfeeding is uncertain with this medication.

## 2024-12-05 DIAGNOSIS — E03.9 ACQUIRED HYPOTHYROIDISM: ICD-10-CM

## 2024-12-05 DIAGNOSIS — I10 ESSENTIAL HYPERTENSION: ICD-10-CM

## 2024-12-05 DIAGNOSIS — E78.2 MIXED HYPERLIPIDEMIA: ICD-10-CM

## 2024-12-05 RX ORDER — AMLODIPINE BESYLATE 2.5 MG/1
2.5 TABLET ORAL DAILY
Qty: 90 TABLET | Refills: 0 | Status: SHIPPED | OUTPATIENT
Start: 2024-12-05

## 2024-12-05 RX ORDER — SIMVASTATIN 20 MG
20 TABLET ORAL DAILY
Qty: 90 TABLET | Refills: 0 | Status: SHIPPED | OUTPATIENT
Start: 2024-12-05

## 2024-12-05 RX ORDER — PANTOPRAZOLE SODIUM 40 MG/1
40 TABLET, DELAYED RELEASE ORAL DAILY
Qty: 90 TABLET | Refills: 0 | Status: SHIPPED | OUTPATIENT
Start: 2024-12-05

## 2024-12-05 RX ORDER — AMLODIPINE BESYLATE 5 MG/1
5 TABLET ORAL DAILY
Qty: 90 TABLET | Refills: 0 | Status: SHIPPED | OUTPATIENT
Start: 2024-12-05

## 2024-12-05 RX ORDER — LISINOPRIL 20 MG/1
20 TABLET ORAL DAILY
Qty: 90 TABLET | Refills: 0 | Status: SHIPPED | OUTPATIENT
Start: 2024-12-05

## 2024-12-05 RX ORDER — LEVOTHYROXINE SODIUM 50 UG/1
50 TABLET ORAL DAILY
Qty: 90 TABLET | Refills: 0 | Status: SHIPPED | OUTPATIENT
Start: 2024-12-05

## 2024-12-05 NOTE — TELEPHONE ENCOUNTER
Name of Medication(s) Requested:  Requested Prescriptions     Pending Prescriptions Disp Refills    amLODIPine (NORVASC) 2.5 MG tablet 90 tablet 0     Sig: Take 1 tablet by mouth daily    amLODIPine (NORVASC) 5 MG tablet 90 tablet 0     Sig: Take 1 tablet by mouth daily    levothyroxine (SYNTHROID) 50 MCG tablet 90 tablet 0     Sig: Take 1 tablet by mouth Daily    lisinopril (PRINIVIL;ZESTRIL) 20 MG tablet 90 tablet 0     Sig: Take 1 tablet by mouth daily    pantoprazole (PROTONIX) 40 MG tablet 90 tablet 0     Sig: Take 1 tablet by mouth daily    simvastatin (ZOCOR) 20 MG tablet 90 tablet 0     Sig: Take 1 tablet by mouth daily       Medication is on current medication list Yes    Dosage and directions were verified? Yes    Quantity verified: 90 day supply     Pharmacy Verified?  Yes    Last Appointment:  10/9/2024    Future appts:  Future Appointments   Date Time Provider Department Center   12/17/2024 10:00 AM [facility] INF CLINIC ROOM 2 [facility]   1/15/2025 11:40 AM Susana Sosa MD [facility] DEP        (If no appt send self scheduling link. .REFILLAPPT)  Scheduling request sent?     [] Yes  [] No    Does patient need updated?  [] Yes  [] No

## 2025-01-03 ENCOUNTER — OFFICE VISIT (OUTPATIENT)
Dept: ORTHOPEDIC SURGERY | Age: 89
End: 2025-01-03

## 2025-01-03 VITALS — HEIGHT: 61 IN | WEIGHT: 209 LBS | BODY MASS INDEX: 39.46 KG/M2

## 2025-01-03 DIAGNOSIS — M70.61 TROCHANTERIC BURSITIS OF RIGHT HIP: ICD-10-CM

## 2025-01-03 DIAGNOSIS — M79.651 ACUTE THIGH PAIN, RIGHT: Primary | ICD-10-CM

## 2025-01-03 RX ORDER — TRIAMCINOLONE ACETONIDE 40 MG/ML
40 INJECTION, SUSPENSION INTRA-ARTICULAR; INTRAMUSCULAR ONCE
Status: COMPLETED | OUTPATIENT
Start: 2025-01-03 | End: 2025-01-03

## 2025-01-03 RX ADMIN — TRIAMCINOLONE ACETONIDE 40 MG: 40 INJECTION, SUSPENSION INTRA-ARTICULAR; INTRAMUSCULAR at 12:36

## 2025-01-03 NOTE — PROGRESS NOTES
Ivanna Be is a 89 y.o. female, who presents   Chief Complaint   Patient presents with    Leg Pain     Right Leg, lateral upper thigh area, no known recent injury, no previous right hip or right knee surgery.  Walks with a cane, states of pain when walking or standing.         HPI:: Right lateral hip area pains been present for about 2 weeks with no history of injury.  Mrs. corona taken ibuprofen and used Aleve roll-on to no avail.  She has no change in her activity level and there been no injuries.    Allergies; medications; past medical, surgical, family, and social history; and problem list have been reviewed today and updated as indicated in this encounter - see below following Ortho specifics.    Musculoskeletal: Skin condition gross neurovascular functions good in right lower extremity.  Alignment of the lower extremities is symmetric right and left.  She has equal range of motion in hips with no instability and no pain.  She does have tenderness palpation over the greater trochanter.        ASSESSMENT:  Ivanna was seen today for leg pain.    Diagnoses and all orders for this visit:    Acute thigh pain, right    Trochanteric bursitis of right hip     Treatment alternatives were reviewed including medical and physical therapies, injections, and surgical options, expected risks benefits and likely outcome of each were discussed in detail, questions asked and answered and understood.  We discussed the symptoms as well as physical findings.  This seems consistent with trochanteric bursitis.  There is no evidence of significant hip pathology that would contribute to her symptoms.  We discussed treatment options and she is used topical and oral agents to no avail.  I offered her a trochanteric bursal injection and physical therapy.  She was receptive to the injection.    PLAN: Injectionof the right greater trochanteric bursa with  4 cc    1/4  % bupivicaine  and Kenalog   (triamcinalone)         40mgwas

## 2025-01-14 ENCOUNTER — TELEPHONE (OUTPATIENT)
Dept: PRIMARY CARE CLINIC | Age: 89
End: 2025-01-14

## 2025-01-14 DIAGNOSIS — M81.0 SENILE OSTEOPOROSIS: Primary | ICD-10-CM

## 2025-01-14 RX ORDER — DENOSUMAB 60 MG/ML
60 INJECTION SUBCUTANEOUS ONCE
Qty: 1 ML | Refills: 0 | Status: SHIPPED | OUTPATIENT
Start: 2025-01-14 | End: 2025-01-14

## 2025-01-14 NOTE — TELEPHONE ENCOUNTER
Patient needs  calcium lab ordered and prolia order faxed to infusion center fax # [phone]    Appt friday

## 2025-01-17 ENCOUNTER — HOSPITAL ENCOUNTER (OUTPATIENT)
Dept: INFUSION THERAPY | Age: 89
Setting detail: INFUSION SERIES
Discharge: HOME | End: 2025-01-17
Payer: MEDICARE

## 2025-01-17 VITALS
DIASTOLIC BLOOD PRESSURE: 72 MMHG | RESPIRATION RATE: 20 BRPM | SYSTOLIC BLOOD PRESSURE: 161 MMHG | TEMPERATURE: 98 F | OXYGEN SATURATION: 95 % | HEART RATE: 77 BPM

## 2025-01-17 DIAGNOSIS — M81.0 OSTEOPOROSIS, UNSPECIFIED OSTEOPOROSIS TYPE, UNSPECIFIED PATHOLOGICAL FRACTURE PRESENCE: Primary | ICD-10-CM

## 2025-01-17 LAB
ALBUMIN SERPL-MCNC: 4.2 G/DL (ref 3.5–5.2)
ALP SERPL-CCNC: 54 U/L (ref 35–104)
ALT SERPL-CCNC: 17 U/L (ref 0–32)
ANION GAP SERPL CALCULATED.3IONS-SCNC: 9 MMOL/L (ref 7–16)
AST SERPL-CCNC: 16 U/L (ref 0–31)
BILIRUB SERPL-MCNC: 0.8 MG/DL (ref 0–1.2)
BUN SERPL-MCNC: 25 MG/DL (ref 6–23)
CALCIUM SERPL-MCNC: 9.3 MG/DL (ref 8.6–10.2)
CHLORIDE SERPL-SCNC: 106 MMOL/L (ref 98–107)
CO2 SERPL-SCNC: 24 MMOL/L (ref 22–29)
CREAT SERPL-MCNC: 1.5 MG/DL (ref 0.5–1)
GFR, ESTIMATED: 34 ML/MIN/1.73M2
GLUCOSE SERPL-MCNC: 91 MG/DL (ref 74–99)
POTASSIUM SERPL-SCNC: 4.5 MMOL/L (ref 3.5–5)
PROT SERPL-MCNC: 6.7 G/DL (ref 6.4–8.3)
SODIUM SERPL-SCNC: 139 MMOL/L (ref 132–146)

## 2025-01-17 PROCEDURE — 80053 COMPREHEN METABOLIC PANEL: CPT

## 2025-01-17 PROCEDURE — 6360000002 HC RX W HCPCS: Performed by: NURSE PRACTITIONER

## 2025-01-17 PROCEDURE — 36415 COLL VENOUS BLD VENIPUNCTURE: CPT

## 2025-01-17 PROCEDURE — 96372 THER/PROPH/DIAG INJ SC/IM: CPT

## 2025-01-17 RX ADMIN — DENOSUMAB 60 MG: 60 INJECTION SUBCUTANEOUS at 10:55

## 2025-03-10 DIAGNOSIS — I10 ESSENTIAL HYPERTENSION: ICD-10-CM

## 2025-03-10 DIAGNOSIS — E78.2 MIXED HYPERLIPIDEMIA: ICD-10-CM

## 2025-03-10 DIAGNOSIS — E03.9 ACQUIRED HYPOTHYROIDISM: ICD-10-CM

## 2025-03-10 RX ORDER — LISINOPRIL 20 MG/1
20 TABLET ORAL DAILY
Qty: 90 TABLET | Refills: 1 | Status: SHIPPED | OUTPATIENT
Start: 2025-03-10

## 2025-03-10 RX ORDER — LEVOTHYROXINE SODIUM 50 UG/1
50 TABLET ORAL DAILY
Qty: 90 TABLET | Refills: 1 | Status: SHIPPED | OUTPATIENT
Start: 2025-03-10

## 2025-03-10 RX ORDER — SIMVASTATIN 20 MG
20 TABLET ORAL DAILY
Qty: 90 TABLET | Refills: 1 | Status: SHIPPED | OUTPATIENT
Start: 2025-03-10

## 2025-03-10 RX ORDER — AMLODIPINE BESYLATE 5 MG/1
5 TABLET ORAL DAILY
Qty: 90 TABLET | Refills: 1 | Status: SHIPPED | OUTPATIENT
Start: 2025-03-10

## 2025-03-10 RX ORDER — AMLODIPINE BESYLATE 2.5 MG/1
2.5 TABLET ORAL DAILY
Qty: 90 TABLET | Refills: 1 | Status: SHIPPED | OUTPATIENT
Start: 2025-03-10

## 2025-03-10 RX ORDER — PANTOPRAZOLE SODIUM 40 MG/1
40 TABLET, DELAYED RELEASE ORAL DAILY
Qty: 90 TABLET | Refills: 1 | Status: SHIPPED | OUTPATIENT
Start: 2025-03-10

## 2025-03-10 NOTE — TELEPHONE ENCOUNTER
Name of Medication(s) Requested:  Requested Prescriptions     Pending Prescriptions Disp Refills    amLODIPine (NORVASC) 2.5 MG tablet 90 tablet 1     Sig: Take 1 tablet by mouth daily    amLODIPine (NORVASC) 5 MG tablet 90 tablet 1     Sig: Take 1 tablet by mouth daily    levothyroxine (SYNTHROID) 50 MCG tablet 90 tablet 1     Sig: Take 1 tablet by mouth Daily    lisinopril (PRINIVIL;ZESTRIL) 20 MG tablet 90 tablet 1     Sig: Take 1 tablet by mouth daily    pantoprazole (PROTONIX) 40 MG tablet 90 tablet 1     Sig: Take 1 tablet by mouth daily    simvastatin (ZOCOR) 20 MG tablet 90 tablet 1     Sig: Take 1 tablet by mouth daily       Medication is on current medication list Yes    Dosage and directions were verified? Yes    Quantity verified: 90 day supply     Pharmacy Verified?  Yes    Last Appointment:  10/9/2024    Future appts:  Future Appointments   Date Time Provider Department Center   3/13/2025 11:00 AM Susana Sosa MD [facility] DEP   7/18/2025 10:00 AM [facility] INF CLINIC ROOM 2 [facility]        (If no appt send self scheduling link. .REFILLAPPT)  Scheduling request sent?     [] Yes  [] No    Does patient need updated?  [] Yes  [] No

## 2025-03-13 ENCOUNTER — OFFICE VISIT (OUTPATIENT)
Dept: PRIMARY CARE CLINIC | Age: 89
End: 2025-03-13

## 2025-03-13 VITALS
DIASTOLIC BLOOD PRESSURE: 76 MMHG | WEIGHT: 206.7 LBS | HEIGHT: 61 IN | HEART RATE: 76 BPM | TEMPERATURE: 98.6 F | OXYGEN SATURATION: 97 % | SYSTOLIC BLOOD PRESSURE: 122 MMHG | BODY MASS INDEX: 39.03 KG/M2

## 2025-03-13 DIAGNOSIS — Z00.00 WELCOME TO MEDICARE PREVENTIVE VISIT: Primary | ICD-10-CM

## 2025-03-13 DIAGNOSIS — I10 ESSENTIAL HYPERTENSION: ICD-10-CM

## 2025-03-13 DIAGNOSIS — S40.022A TRAUMATIC HEMATOMA OF LEFT UPPER ARM, INITIAL ENCOUNTER: ICD-10-CM

## 2025-03-13 DIAGNOSIS — L20.82 FLEXURAL ECZEMA: ICD-10-CM

## 2025-03-13 RX ORDER — CLOBETASOL PROPIONATE 0.5 MG/G
OINTMENT TOPICAL
Qty: 30 G | Refills: 1 | Status: SHIPPED | OUTPATIENT
Start: 2025-03-13

## 2025-03-13 SDOH — ECONOMIC STABILITY: FOOD INSECURITY: WITHIN THE PAST 12 MONTHS, YOU WORRIED THAT YOUR FOOD WOULD RUN OUT BEFORE YOU GOT MONEY TO BUY MORE.: NEVER TRUE

## 2025-03-13 SDOH — ECONOMIC STABILITY: FOOD INSECURITY: WITHIN THE PAST 12 MONTHS, THE FOOD YOU BOUGHT JUST DIDN'T LAST AND YOU DIDN'T HAVE MONEY TO GET MORE.: NEVER TRUE

## 2025-03-13 ASSESSMENT — PATIENT HEALTH QUESTIONNAIRE - PHQ9
SUM OF ALL RESPONSES TO PHQ QUESTIONS 1-9: 3
SUM OF ALL RESPONSES TO PHQ QUESTIONS 1-9: 0
4. FEELING TIRED OR HAVING LITTLE ENERGY: NOT AT ALL
SUM OF ALL RESPONSES TO PHQ QUESTIONS 1-9: 0
SUM OF ALL RESPONSES TO PHQ QUESTIONS 1-9: 3
9. THOUGHTS THAT YOU WOULD BE BETTER OFF DEAD, OR OF HURTING YOURSELF: NOT AT ALL
3. TROUBLE FALLING OR STAYING ASLEEP: NEARLY EVERY DAY
SUM OF ALL RESPONSES TO PHQ QUESTIONS 1-9: 0
10. IF YOU CHECKED OFF ANY PROBLEMS, HOW DIFFICULT HAVE THESE PROBLEMS MADE IT FOR YOU TO DO YOUR WORK, TAKE CARE OF THINGS AT HOME, OR GET ALONG WITH OTHER PEOPLE: NOT DIFFICULT AT ALL
1. LITTLE INTEREST OR PLEASURE IN DOING THINGS: NOT AT ALL
2. FEELING DOWN, DEPRESSED OR HOPELESS: NOT AT ALL
2. FEELING DOWN, DEPRESSED OR HOPELESS: NOT AT ALL
SUM OF ALL RESPONSES TO PHQ QUESTIONS 1-9: 3
7. TROUBLE CONCENTRATING ON THINGS, SUCH AS READING THE NEWSPAPER OR WATCHING TELEVISION: NOT AT ALL
SUM OF ALL RESPONSES TO PHQ QUESTIONS 1-9: 0
8. MOVING OR SPEAKING SO SLOWLY THAT OTHER PEOPLE COULD HAVE NOTICED. OR THE OPPOSITE, BEING SO FIGETY OR RESTLESS THAT YOU HAVE BEEN MOVING AROUND A LOT MORE THAN USUAL: NOT AT ALL
1. LITTLE INTEREST OR PLEASURE IN DOING THINGS: NOT AT ALL
5. POOR APPETITE OR OVEREATING: NOT AT ALL
SUM OF ALL RESPONSES TO PHQ QUESTIONS 1-9: 3
6. FEELING BAD ABOUT YOURSELF - OR THAT YOU ARE A FAILURE OR HAVE LET YOURSELF OR YOUR FAMILY DOWN: NOT AT ALL

## 2025-03-13 ASSESSMENT — LIFESTYLE VARIABLES
HOW OFTEN DURING THE LAST YEAR HAVE YOU FAILED TO DO WHAT WAS NORMALLY EXPECTED FROM YOU BECAUSE OF DRINKING: NEVER
HOW OFTEN DO YOU HAVE A DRINK CONTAINING ALCOHOL: 4 OR MORE TIMES A WEEK
HAVE YOU OR SOMEONE ELSE BEEN INJURED AS A RESULT OF YOUR DRINKING: NO
HOW OFTEN DURING THE LAST YEAR HAVE YOU FOUND THAT YOU WERE NOT ABLE TO STOP DRINKING ONCE YOU HAD STARTED: NEVER
HOW OFTEN DURING THE LAST YEAR HAVE YOU BEEN UNABLE TO REMEMBER WHAT HAPPENED THE NIGHT BEFORE BECAUSE YOU HAD BEEN DRINKING: NEVER
HAS A RELATIVE, FRIEND, DOCTOR, OR ANOTHER HEALTH PROFESSIONAL EXPRESSED CONCERN ABOUT YOUR DRINKING OR SUGGESTED YOU CUT DOWN: NO
HOW MANY STANDARD DRINKS CONTAINING ALCOHOL DO YOU HAVE ON A TYPICAL DAY: 1 OR 2
HOW OFTEN DURING THE LAST YEAR HAVE YOU HAD A FEELING OF GUILT OR REMORSE AFTER DRINKING: NEVER
HOW OFTEN DURING THE LAST YEAR HAVE YOU NEEDED AN ALCOHOLIC DRINK FIRST THING IN THE MORNING TO GET YOURSELF GOING AFTER A NIGHT OF HEAVY DRINKING: NEVER

## 2025-03-13 NOTE — PATIENT INSTRUCTIONS
Learning About Being Active as an Older Adult  Why is being active important as you get older?     Being active is one of the best things you can do for your health. And it's never too late to start. Being active--or getting active, if you aren't already--has definite benefits. It can:  Give you more energy,  Keep your mind sharp.  Improve balance to reduce your risk of falls.  Help you manage chronic illness with fewer medicines.  No matter how old you are, how fit you are, or what health problems you have, there is a form of activity that will work for you. And the more physical activity you can do, the better your overall health will be.  What kinds of activity can help you stay healthy?  Being more active will make your daily activities easier. Physical activity includes planned exercise and things you do in daily life. There are four types of activity:  Aerobic.  Doing aerobic activity makes your heart and lungs strong.  Includes walking, dancing, and gardening.  Aim for at least 2½ hours spread throughout the week.  It improves your energy and can help you sleep better.  Muscle-strengthening.  This type of activity can help maintain muscle and strengthen bones.  Includes climbing stairs, using resistance bands, and lifting or carrying heavy loads.  Aim for at least twice a week.  It can help protect the knees and other joints.  Stretching.  Stretching gives you better range of motion in joints and muscles.  Includes upper arm stretches, calf stretches, and gentle yoga.  Aim for at least twice a week, preferably after your muscles are warmed up from other activities.  It can help you function better in daily life.  Balancing.  This helps you stay coordinated and have good posture.  Includes heel-to-toe walking, maria chi, and certain types of yoga.  Aim for at least 3 days a week.  It can reduce your risk of falling.  Even if you have a hard time meeting the recommendations, it's better to be more active

## 2025-03-13 NOTE — PROGRESS NOTES
Chest pain or pressure, or a strange feeling in the chest.     Sweating.     Shortness of breath.     Pain, pressure, or a strange feeling in the back, neck, jaw, or upper belly or in one or both shoulders or arms.     Lightheadedness or sudden weakness.     A fast or irregular heartbeat.   After you call 911, the  may tell you to chew 1 adult-strength or 2 to 4 low-dose aspirin. Wait for an ambulance. Do not try to drive yourself.  Watch closely for changes in your health, and be sure to contact your doctor if you have any problems.  Where can you learn more?  Go to https://www.YouTube.net/patientEd and enter F075 to learn more about \"A Healthy Heart: Care Instructions.\"  Current as of: July 31, 2024  Content Version: 14.4  © 4563-6356 [employer].   Care instructions adapted under license by [employer]. If you have questions about a medical condition or this instruction, always ask your healthcare professional. [employer] disclaims any warranty or liability for your use of this information.    Personalized Preventive Plan for Ivanna Be - 3/13/2025  Medicare offers a range of preventive health benefits. Some of the tests and screenings are paid in full while other may be subject to a deductible, co-insurance, and/or copay.  Some of these benefits include a comprehensive review of your medical history including lifestyle, illnesses that may run in your family, and various assessments and screenings as appropriate.  After reviewing your medical record and screening and assessments performed today your provider may have ordered immunizations, labs, imaging, and/or referrals for you.  A list of these orders (if applicable) as well as your Preventive Care list are included within your After Visit Summary for your review.          On this date 3/13/2025 I have spent 25 minutes reviewing previous notes, test results and face to face with the patient discussing the diagnosis and

## 2025-05-14 ENCOUNTER — OFFICE VISIT (OUTPATIENT)
Dept: PRIMARY CARE CLINIC | Age: 89
End: 2025-05-14

## 2025-05-14 VITALS
DIASTOLIC BLOOD PRESSURE: 66 MMHG | WEIGHT: 201.8 LBS | BODY MASS INDEX: 38.1 KG/M2 | TEMPERATURE: 99.1 F | OXYGEN SATURATION: 94 % | HEIGHT: 61 IN | HEART RATE: 78 BPM | SYSTOLIC BLOOD PRESSURE: 132 MMHG

## 2025-05-14 DIAGNOSIS — E03.9 ACQUIRED HYPOTHYROIDISM: ICD-10-CM

## 2025-05-14 DIAGNOSIS — I10 ESSENTIAL HYPERTENSION: ICD-10-CM

## 2025-05-14 DIAGNOSIS — K58.0 IRRITABLE BOWEL SYNDROME WITH DIARRHEA: Primary | ICD-10-CM

## 2025-05-14 RX ORDER — DICYCLOMINE HYDROCHLORIDE 10 MG/1
10 CAPSULE ORAL 2 TIMES DAILY
Qty: 60 CAPSULE | Refills: 1 | Status: SHIPPED | OUTPATIENT
Start: 2025-05-14

## 2025-05-14 NOTE — PROGRESS NOTES
Chief Complaint   Patient presents with    Diarrhea     After she eats a full meal about a half an hour later she has diarrhea, does not matter what she eats and this has been going on for 2 weeks       HPI:  Patient is here for acute visit.  Patient complains for the last 3 weeks of explosive diarrhea after a full meal 3 times a day no abdominal pain no fever no chills no hematochezia, it happens to a point where she can lose control of her BM  Denied cardiopulmonary symptoms has not received any antibiotics lately compliant on her medications        Past Medical History, Surgical History, and Family History has been reviewed and updated.    Review of Systems:  Constitutional:  No fever, no fatigue, no chills, no headaches, no weight change  Dermatology:  No rash, no mole, no dry or sensitive skin  ENT:  No cough, no sore throat, no sinus pain, no runny nose, no ear pain  Cardiology:  No chest pain, no palpitations, no leg edema, no shortness of breath, no PND  Gastroenterology:  No dysphagia, no abdominal pain, no nausea, no vomiting, no constipation, no diarrhea, no heartburn  Musculoskeletal:  No joint pain, no leg cramps, no back pain, no muscle aches  Respiratory:  No shortness of breath, no orthopnea, no wheezing, no ASHLEY, no hemoptysis  Urology:  No blood in the urine, no urinary frequency, no urinary incontinence, no urinary urgency, no nocturia, no dysuria    Vitals:    05/14/25 1200   BP: 132/66   BP Site: Right Upper Arm   Patient Position: Sitting   BP Cuff Size: Medium Adult   Pulse: 78   Temp: 99.1 °F (37.3 °C)   TempSrc: Temporal   SpO2: 94%   Weight: 91.5 kg (201 lb 12.8 oz)   Height: 1.549 m (5' 0.98\")       General:  Patient alert and oriented x 3, NAD, pleasant  HEENT:  Atraumatic, normocephalic, PERRLA, EOMI, clear conjunctiva, TMs clear, nose-clear, throat - no erythema  Neck:  Supple, no goiter, no carotid bruits, no LAD  Lungs:  CTA   Heart:  RRR, no murmurs, gallops or rubs  Abdomen:

## 2025-06-03 ENCOUNTER — TELEPHONE (OUTPATIENT)
Dept: PRIMARY CARE CLINIC | Age: 89
End: 2025-06-03

## 2025-06-03 NOTE — TELEPHONE ENCOUNTER
Patient states that she is still having diarrhea and today alone after she ate she went 4 times.  The pills you gave her are not working per patient.  Please advise?

## 2025-06-04 DIAGNOSIS — R19.7 DIARRHEA, UNSPECIFIED TYPE: Primary | ICD-10-CM

## 2025-06-04 NOTE — TELEPHONE ENCOUNTER
Called patient an told her doctor wanted her to take the diarrhea medication 4 x daily instead of twice and she needs to give a stool sample as well to check for C Diff.  Patient stated she does not drive and if she can get a ride she will do the test, I did explain that it was important to do the test because she could get very ill if she did have an infection.  Patient voiced her understanding and I told her to call the office with any questions.

## 2025-06-13 ENCOUNTER — OFFICE VISIT (OUTPATIENT)
Dept: PRIMARY CARE CLINIC | Age: 89
End: 2025-06-13

## 2025-06-13 ENCOUNTER — HOSPITAL ENCOUNTER (OUTPATIENT)
Age: 89
Discharge: HOME | End: 2025-06-13
Payer: MEDICARE

## 2025-06-13 VITALS
OXYGEN SATURATION: 97 % | TEMPERATURE: 99.3 F | HEART RATE: 97 BPM | DIASTOLIC BLOOD PRESSURE: 64 MMHG | SYSTOLIC BLOOD PRESSURE: 118 MMHG | HEIGHT: 61 IN | BODY MASS INDEX: 37.59 KG/M2 | WEIGHT: 199.1 LBS

## 2025-06-13 DIAGNOSIS — E86.0 DEHYDRATION, MILD: Primary | ICD-10-CM

## 2025-06-13 DIAGNOSIS — E03.9 ACQUIRED HYPOTHYROIDISM: ICD-10-CM

## 2025-06-13 DIAGNOSIS — E78.2 MIXED HYPERLIPIDEMIA: ICD-10-CM

## 2025-06-13 DIAGNOSIS — I10 ESSENTIAL HYPERTENSION: ICD-10-CM

## 2025-06-13 DIAGNOSIS — L20.82 FLEXURAL ECZEMA: ICD-10-CM

## 2025-06-13 DIAGNOSIS — K58.0 IRRITABLE BOWEL SYNDROME WITH DIARRHEA: ICD-10-CM

## 2025-06-13 LAB
ALBUMIN SERPL-MCNC: 4.2 G/DL (ref 3.5–5.2)
ALP SERPL-CCNC: 46 U/L (ref 35–104)
ALT SERPL-CCNC: 17 U/L (ref 0–35)
ANION GAP SERPL CALCULATED.3IONS-SCNC: 11 MMOL/L (ref 7–16)
AST SERPL-CCNC: 20 U/L (ref 0–35)
BILIRUB SERPL-MCNC: 0.5 MG/DL (ref 0–1.2)
BUN SERPL-MCNC: 29 MG/DL (ref 8–23)
CALCIUM SERPL-MCNC: 9.7 MG/DL (ref 8.8–10.2)
CHLORIDE SERPL-SCNC: 105 MMOL/L (ref 98–107)
CO2 SERPL-SCNC: 25 MMOL/L (ref 22–29)
CREAT SERPL-MCNC: 1.6 MG/DL (ref 0.5–1)
GFR, ESTIMATED: 30 ML/MIN/1.73M2
GLUCOSE SERPL-MCNC: 99 MG/DL (ref 74–99)
POTASSIUM SERPL-SCNC: 5.8 MMOL/L (ref 3.5–5.1)
PROT SERPL-MCNC: 7 G/DL (ref 6.4–8.3)
SODIUM SERPL-SCNC: 141 MMOL/L (ref 136–145)

## 2025-06-13 PROCEDURE — 80053 COMPREHEN METABOLIC PANEL: CPT

## 2025-06-13 PROCEDURE — 36415 COLL VENOUS BLD VENIPUNCTURE: CPT

## 2025-06-13 RX ORDER — LISINOPRIL 20 MG/1
20 TABLET ORAL DAILY
Qty: 90 TABLET | Refills: 1 | Status: SHIPPED | OUTPATIENT
Start: 2025-06-13

## 2025-06-13 RX ORDER — DICYCLOMINE HYDROCHLORIDE 10 MG/1
10 CAPSULE ORAL
Qty: 120 CAPSULE | Refills: 1 | Status: SHIPPED | OUTPATIENT
Start: 2025-06-13 | End: 2025-08-12

## 2025-06-13 RX ORDER — AMLODIPINE BESYLATE 5 MG/1
5 TABLET ORAL DAILY
Qty: 90 TABLET | Refills: 1 | Status: SHIPPED | OUTPATIENT
Start: 2025-06-13

## 2025-06-13 RX ORDER — SIMVASTATIN 20 MG
20 TABLET ORAL DAILY
Qty: 90 TABLET | Refills: 1 | Status: SHIPPED | OUTPATIENT
Start: 2025-06-13

## 2025-06-13 RX ORDER — CLOBETASOL PROPIONATE 0.5 MG/G
OINTMENT TOPICAL
Qty: 30 G | Refills: 1 | Status: SHIPPED | OUTPATIENT
Start: 2025-06-13

## 2025-06-13 RX ORDER — AMLODIPINE BESYLATE 2.5 MG/1
2.5 TABLET ORAL DAILY
Qty: 90 TABLET | Refills: 1 | Status: SHIPPED | OUTPATIENT
Start: 2025-06-13

## 2025-06-13 RX ORDER — LEVOTHYROXINE SODIUM 50 UG/1
50 TABLET ORAL DAILY
Qty: 90 TABLET | Refills: 1 | Status: SHIPPED | OUTPATIENT
Start: 2025-06-13

## 2025-06-13 RX ORDER — PANTOPRAZOLE SODIUM 40 MG/1
40 TABLET, DELAYED RELEASE ORAL DAILY
Qty: 90 TABLET | Refills: 1 | Status: SHIPPED | OUTPATIENT
Start: 2025-06-13

## 2025-06-13 NOTE — PROGRESS NOTES
Chief Complaint   Patient presents with    Diarrhea     Diarrhea went away for a couple days now it's back and her insurance won't pay for her the medication       HPI:  Patient is here for follow-up.  Patient feels much better on the dicyclomine 4 times a day  Has 1 bowel movement a day stool is pasty not watery no fever no abdominal pain no cramps no blood in the stool.  Patient was advised over the phone to do a stool culture and C. difficile however she did not do it.  Patient requested refills on her prescription  She lost 10 pounds.    Past Medical History, Surgical History, and Family History has been reviewed and updated.    Review of Systems:  Constitutional:  No fever, no fatigue, no chills, no headaches, no weight change  Dermatology:  No rash, no mole, no dry or sensitive skin  ENT:  No cough, no sore throat, no sinus pain, no runny nose, no ear pain  Cardiology:  No chest pain, no palpitations, no leg edema, no shortness of breath, no PND  Gastroenterology:  No dysphagia, no abdominal pain, no nausea, no vomiting, no constipation, no diarrhea, no heartburn  Musculoskeletal:  No joint pain, no leg cramps, no back pain, no muscle aches  Respiratory:  No shortness of breath, no orthopnea, no wheezing, no ASHLEY, no hemoptysis  Urology:  No blood in the urine, no urinary frequency, no urinary incontinence, no urinary urgency, no nocturia, no dysuria    Vitals:    06/13/25 1023   BP: 118/64   BP Site: Right Upper Arm   Patient Position: Sitting   BP Cuff Size: Medium Adult   Pulse: 97   Temp: 99.3 °F (37.4 °C)   TempSrc: Temporal   SpO2: 97%   Weight: 90.3 kg (199 lb 1.6 oz)   Height: 1.549 m (5' 0.98\")       General:  Patient alert and oriented x 3, NAD, pleasant  HEENT:  Atraumatic, normocephalic, PERRLA, EOMI, clear conjunctiva, TMs clear, nose-clear, throat - no erythema  Neck:  Supple, no goiter, no carotid bruits, no LAD  Lungs:  CTA   Heart:  RRR, no murmurs, gallops or rubs  Abdomen:  Soft/nt/nd, +

## 2025-06-25 ENCOUNTER — CARE COORDINATION (OUTPATIENT)
Dept: CARE COORDINATION | Age: 89
End: 2025-06-25

## 2025-06-25 NOTE — CARE COORDINATION
Ambulatory Care Coordination Note     6/25/2025 3:37 PM     Patient outreach attempt by this AC today to offer care management services. ACM was unable to reach the patient by telephone today;   letter mailed requesting patient  to contact this ACM.   Call not answered, unable to leave voice mail     ACM: Henry Power RN     Care Summary Note: na    PCP/Specialist follow up:   Future Appointments         Provider Specialty Dept Phone    7/18/2025 10:00 AM [facility] INF CLINIC ROOM 2 Infusion Therapy [phone]    8/13/2025 1:00 PM Susana Sosa MD Primary Care [phone]            Follow Up:   Plan for next ACM outreach in approximately 1 week to complete:  - outreach attempt to offer care management services.

## 2025-07-08 DIAGNOSIS — M81.0 SENILE OSTEOPOROSIS: ICD-10-CM

## 2025-07-09 RX ORDER — DENOSUMAB 60 MG/ML
60 INJECTION SUBCUTANEOUS ONCE
Qty: 1 ML | Refills: 0 | Status: SHIPPED | OUTPATIENT
Start: 2025-07-09 | End: 2025-07-09

## 2025-07-18 ENCOUNTER — HOSPITAL ENCOUNTER (OUTPATIENT)
Dept: INFUSION THERAPY | Age: 89
Setting detail: INFUSION SERIES
Discharge: HOME | End: 2025-07-18
Payer: MEDICARE

## 2025-07-18 VITALS
OXYGEN SATURATION: 96 % | TEMPERATURE: 98.1 F | DIASTOLIC BLOOD PRESSURE: 62 MMHG | RESPIRATION RATE: 20 BRPM | SYSTOLIC BLOOD PRESSURE: 135 MMHG | HEART RATE: 50 BPM

## 2025-07-18 DIAGNOSIS — M81.0 OSTEOPOROSIS, UNSPECIFIED OSTEOPOROSIS TYPE, UNSPECIFIED PATHOLOGICAL FRACTURE PRESENCE: Primary | ICD-10-CM

## 2025-07-18 PROCEDURE — 96372 THER/PROPH/DIAG INJ SC/IM: CPT

## 2025-07-18 PROCEDURE — 6360000002 HC RX W HCPCS: Performed by: INTERNAL MEDICINE

## 2025-07-18 RX ADMIN — DENOSUMAB 60 MG: 60 INJECTION SUBCUTANEOUS at 10:05

## 2025-07-21 ENCOUNTER — CARE COORDINATION (OUTPATIENT)
Dept: CARE COORDINATION | Age: 89
End: 2025-07-21

## 2025-07-21 NOTE — CARE COORDINATION
Ambulatory Care Coordination Note     7/21/2025 1:44 PM     Patient outreach attempt by this AC today to offer care management services. ACM was unable to reach the patient by telephone today;   left voice message requesting a return phone call to this ACM.  letter mailed requesting patient  to contact this ACM.      ACM: Henry Power RN     Care Summary Note: na    PCP/Specialist follow up:   Future Appointments         Provider Specialty Dept Phone    8/13/2025 1:00 PM Susana Sosa MD Primary Care [phone]    1/22/2026 10:00 AM [facility] INF CLINIC ROOM 8 Infusion Therapy [phone]            Follow Up:   Plan for next AC outreach in approximately 1 week to complete:  - outreach attempt to offer care management services.            
No

## 2025-08-04 ENCOUNTER — CARE COORDINATION (OUTPATIENT)
Dept: CARE COORDINATION | Age: 89
End: 2025-08-04

## 2025-08-28 ENCOUNTER — OFFICE VISIT (OUTPATIENT)
Dept: PRIMARY CARE CLINIC | Age: 89
End: 2025-08-28

## 2025-08-28 VITALS
BODY MASS INDEX: 36.72 KG/M2 | WEIGHT: 194.5 LBS | SYSTOLIC BLOOD PRESSURE: 126 MMHG | TEMPERATURE: 98.9 F | DIASTOLIC BLOOD PRESSURE: 76 MMHG | HEIGHT: 61 IN | HEART RATE: 80 BPM | OXYGEN SATURATION: 97 %

## 2025-08-28 DIAGNOSIS — I10 ESSENTIAL HYPERTENSION: ICD-10-CM

## 2025-08-28 DIAGNOSIS — E03.9 ACQUIRED HYPOTHYROIDISM: ICD-10-CM

## 2025-08-28 DIAGNOSIS — K58.0 IRRITABLE BOWEL SYNDROME WITH DIARRHEA: Primary | ICD-10-CM

## 2025-08-28 DIAGNOSIS — M40.05 POSTURAL KYPHOSIS OF THORACOLUMBAR REGION: ICD-10-CM

## 2025-08-28 DIAGNOSIS — R06.03 RESPIRATORY DISTRESS: ICD-10-CM

## 2025-08-28 PROBLEM — K25.9 GASTRIC ULCER DUE TO NONSTEROIDAL ANTI-INFLAMMATORY DRUG (NSAID): Status: RESOLVED | Noted: 2019-09-21 | Resolved: 2025-08-28

## 2025-08-28 PROBLEM — N18.30 CHRONIC RENAL DISEASE, STAGE III (HCC): Status: RESOLVED | Noted: 2022-10-14 | Resolved: 2025-08-28

## 2025-08-28 PROBLEM — T39.395A GASTRIC ULCER DUE TO NONSTEROIDAL ANTI-INFLAMMATORY DRUG (NSAID): Status: RESOLVED | Noted: 2019-09-21 | Resolved: 2025-08-28

## 2025-08-28 RX ORDER — LOPERAMIDE HYDROCHLORIDE 2 MG/1
2 CAPSULE ORAL 2 TIMES DAILY
Qty: 60 CAPSULE | Refills: 1 | Status: SHIPPED | OUTPATIENT
Start: 2025-08-28 | End: 2025-10-27

## (undated) DEVICE — DISPOSABLE NEEDLE: Brand: DISPOSABLE NEEDLE

## (undated) DEVICE — SYRINGE MED 10ML LUERLOCK TIP W/O SFTY DISP

## (undated) DEVICE — MASK,FACE,MAXFLUIDPROTECT,SHIELD/ERLPS: Brand: MEDLINE

## (undated) DEVICE — GOWN ISOLATN REG YEL M WT MULTIPLY SIDETIE LEV 2

## (undated) DEVICE — TUBING, SUCTION, 1/4" X 10', STRAIGHT: Brand: MEDLINE

## (undated) DEVICE — FORCEPS BX L240CM JAW DIA2.8MM L CAP W/ NDL MIC MESH TOOTH

## (undated) DEVICE — KIT BEDSIDE REVITAL OX 500ML

## (undated) DEVICE — CATHETER URETH 14FR L16IN RED RUB RND HLLW TIP W/ TWO EYE

## (undated) DEVICE — GOWN,SIRUS,FABRNF,XL,20/CS: Brand: MEDLINE

## (undated) DEVICE — READY WET SKIN SCRUB TRAY-LF: Brand: MEDLINE INDUSTRIES, INC.

## (undated) DEVICE — GLOVE SURG SZ 75 STD WHT LTX SYN POLYMER BEAD REINF ANTI RL

## (undated) DEVICE — CAMERA STRYKER 1488 HD GEN

## (undated) DEVICE — INJECTOR: Brand: INJECTOR

## (undated) DEVICE — LUBRICANT SURG JELLY ST BACTER TUBE 4.25OZ

## (undated) DEVICE — SPONGE GZ 4IN 4IN 4 PLY N WVN AVANT

## (undated) DEVICE — MINOR PROCEDURE DRAPE: Brand: CONVERTORS

## (undated) DEVICE — Device: Brand: DEFENDO VALVE AND CONNECTOR KIT

## (undated) DEVICE — YANKAUER,BULB TIP,W/O VENT,RIGID,STERILE: Brand: MEDLINE

## (undated) DEVICE — CYSTO PACK: Brand: MEDLINE INDUSTRIES, INC.

## (undated) DEVICE — SOLUTION IV IRRIG WATER 1000ML POUR BRL 2F7114

## (undated) DEVICE — SURGICAL PROCEDURE PACK BASIC

## (undated) DEVICE — CONTAINER SPEC COLL 960ML POLYPR TRIANG GRAD INTAKE/OUTPUT

## (undated) DEVICE — KENDALL 450 SERIES MONITORING FOAM ELECTRODE - RECTANGULAR SHAPE ( 3/PK): Brand: KENDALL

## (undated) DEVICE — BLOCK BITE 60FR CAREGUARD

## (undated) DEVICE — Z INACTIVE USE 2635503 SOLUTION IRRIG 3000ML ST H2O USP UROMATIC PLAS CONT

## (undated) DEVICE — STANDARD HYPODERMIC NEEDLE,POLYPROPYLENE HUB: Brand: MONOJECT

## (undated) DEVICE — 6 X 9  1.75MIL 4-WALL LABGUARD: Brand: MINIGRIP COMMERCIAL LLC

## (undated) DEVICE — COVER,LIGHT HANDLE,FLX,1/PK: Brand: MEDLINE INDUSTRIES, INC.

## (undated) DEVICE — TOWEL,OR,DSP,ST,BLUE,STD,6/PK,12PK/CS: Brand: MEDLINE